# Patient Record
Sex: FEMALE | Race: WHITE | Employment: FULL TIME | URBAN - METROPOLITAN AREA
[De-identification: names, ages, dates, MRNs, and addresses within clinical notes are randomized per-mention and may not be internally consistent; named-entity substitution may affect disease eponyms.]

---

## 2019-07-02 ENCOUNTER — APPOINTMENT (OUTPATIENT)
Dept: CT IMAGING | Age: 39
End: 2019-07-02
Payer: COMMERCIAL

## 2019-07-02 ENCOUNTER — HOSPITAL ENCOUNTER (EMERGENCY)
Age: 39
Discharge: HOME | End: 2019-07-02
Attending: EMERGENCY MEDICINE
Payer: COMMERCIAL

## 2019-07-02 DIAGNOSIS — S39.011A ABDOMINAL WALL STRAIN, INITIAL ENCOUNTER: ICD-10-CM

## 2019-07-02 DIAGNOSIS — R73.9 HYPERGLYCEMIA: ICD-10-CM

## 2019-07-02 DIAGNOSIS — R10.9 ACUTE ABDOMINAL PAIN: Primary | ICD-10-CM

## 2019-07-02 LAB
A/G RATIO: 0.9 (ref 1.1–2.2)
ALBUMIN SERPL-MCNC: 3.7 G/DL (ref 3.4–5)
ALP BLD-CCNC: 114 U/L (ref 40–129)
ALT SERPL-CCNC: 68 U/L (ref 10–40)
ANION GAP SERPL CALCULATED.3IONS-SCNC: 13 MMOL/L (ref 3–16)
AST SERPL-CCNC: 96 U/L (ref 15–37)
BASOPHILS ABSOLUTE: 0.1 K/UL (ref 0–0.2)
BASOPHILS RELATIVE PERCENT: 1.4 %
BILIRUB SERPL-MCNC: 0.5 MG/DL (ref 0–1)
BILIRUBIN URINE: NEGATIVE
BLOOD, URINE: NEGATIVE
BUN BLDV-MCNC: 11 MG/DL (ref 7–20)
CALCIUM SERPL-MCNC: 9.5 MG/DL (ref 8.3–10.6)
CHLORIDE BLD-SCNC: 96 MMOL/L (ref 99–110)
CLARITY: CLEAR
CO2: 28 MMOL/L (ref 21–32)
COLOR: YELLOW
CREAT SERPL-MCNC: 0.6 MG/DL (ref 0.6–1.1)
EOSINOPHILS ABSOLUTE: 0.2 K/UL (ref 0–0.6)
EOSINOPHILS RELATIVE PERCENT: 2.9 %
GFR AFRICAN AMERICAN: >60
GFR NON-AFRICAN AMERICAN: >60
GLOBULIN: 3.9 G/DL
GLUCOSE BLD-MCNC: 323 MG/DL (ref 70–99)
GLUCOSE URINE: >=1000 MG/DL
HCG(URINE) PREGNANCY TEST: NEGATIVE
HCT VFR BLD CALC: 40.1 % (ref 36–48)
HEMOGLOBIN: 13.7 G/DL (ref 12–16)
KETONES, URINE: ABNORMAL MG/DL
LEUKOCYTE ESTERASE, URINE: NEGATIVE
LIPASE: 19 U/L (ref 13–60)
LYMPHOCYTES ABSOLUTE: 1.5 K/UL (ref 1–5.1)
LYMPHOCYTES RELATIVE PERCENT: 20.5 %
MCH RBC QN AUTO: 33.1 PG (ref 26–34)
MCHC RBC AUTO-ENTMCNC: 34.2 G/DL (ref 31–36)
MCV RBC AUTO: 96.7 FL (ref 80–100)
MICROSCOPIC EXAMINATION: ABNORMAL
MONOCYTES ABSOLUTE: 0.3 K/UL (ref 0–1.3)
MONOCYTES RELATIVE PERCENT: 4.3 %
NEUTROPHILS ABSOLUTE: 5.2 K/UL (ref 1.7–7.7)
NEUTROPHILS RELATIVE PERCENT: 70.9 %
NITRITE, URINE: NEGATIVE
PDW BLD-RTO: 12.1 % (ref 12.4–15.4)
PH UA: 6 (ref 5–8)
PLATELET # BLD: 250 K/UL (ref 135–450)
PMV BLD AUTO: 7.9 FL (ref 5–10.5)
POTASSIUM REFLEX MAGNESIUM: 4.4 MMOL/L (ref 3.5–5.1)
PROTEIN UA: NEGATIVE MG/DL
RBC # BLD: 4.15 M/UL (ref 4–5.2)
SODIUM BLD-SCNC: 137 MMOL/L (ref 136–145)
SPECIFIC GRAVITY UA: 1.01 (ref 1–1.03)
TOTAL PROTEIN: 7.6 G/DL (ref 6.4–8.2)
URINE REFLEX TO CULTURE: ABNORMAL
URINE TYPE: ABNORMAL
UROBILINOGEN, URINE: 1 E.U./DL
WBC # BLD: 7.3 K/UL (ref 4–11)

## 2019-07-02 PROCEDURE — 83690 ASSAY OF LIPASE: CPT

## 2019-07-02 PROCEDURE — 80053 COMPREHEN METABOLIC PANEL: CPT

## 2019-07-02 PROCEDURE — 74176 CT ABD & PELVIS W/O CONTRAST: CPT

## 2019-07-02 PROCEDURE — 36415 COLL VENOUS BLD VENIPUNCTURE: CPT

## 2019-07-02 PROCEDURE — 81003 URINALYSIS AUTO W/O SCOPE: CPT

## 2019-07-02 PROCEDURE — 85025 COMPLETE CBC W/AUTO DIFF WBC: CPT

## 2019-07-02 PROCEDURE — 99284 EMERGENCY DEPT VISIT MOD MDM: CPT

## 2019-07-02 PROCEDURE — 84703 CHORIONIC GONADOTROPIN ASSAY: CPT

## 2019-07-02 RX ORDER — NAPROXEN 500 MG/1
500 TABLET ORAL 2 TIMES DAILY
Qty: 15 TABLET | Refills: 0 | Status: SHIPPED | OUTPATIENT
Start: 2019-07-02 | End: 2021-07-22

## 2019-07-02 ASSESSMENT — PAIN SCALES - GENERAL: PAINLEVEL_OUTOF10: 2

## 2019-07-02 ASSESSMENT — PAIN DESCRIPTION - PAIN TYPE: TYPE: ACUTE PAIN

## 2019-07-02 ASSESSMENT — PAIN DESCRIPTION - DESCRIPTORS: DESCRIPTORS: STABBING

## 2019-07-02 ASSESSMENT — PAIN DESCRIPTION - LOCATION: LOCATION: ABDOMEN;BACK

## 2019-07-02 NOTE — ED PROVIDER NOTES
organizations: None     Relationship status: None    Intimate partner violence:     Fear of current or ex partner: None     Emotionally abused: None     Physically abused: None     Forced sexual activity: None   Other Topics Concern    None   Social History Narrative    None       SCREENINGS             PHYSICAL EXAM    (up to 7 for level 4, 8 or more for level 5)     ED Triage Vitals [07/02/19 1843]   BP Temp Temp Source Pulse Resp SpO2 Height Weight   (!) 154/84 98.2 °F (36.8 °C) Oral 91 18 95 % 5' 1\" (1.549 m) 296 lb 11.8 oz (134.6 kg)       Physical Exam   Constitutional: Awake and alert and oriented to person place and time. No apparent distress. Head: No visible evidence of trauma. Normocephalic. Eyes: Pupils equal and reactive. No photophobia. Conjunctiva normal.    HENT: Oral mucosa moist.  Airway patent. Neck:  Soft and supple. Heart:  Regular rate and rhythm. No murmur. Lungs:  Clear to auscultation. No wheezes, rales, or ronchi. No conversational dyspnea or accessory muscle use. Chest: Chest wall non-tender. No evidence of trauma. Abdomen:  Soft, nondistended, bowel sounds present. Mild tenderness in the right upper quadrant. Negative Gonzalez sign. No guarding rigidity or rebound. No masses. Musculoskeletal: Extremities non-tender with full range of motion. Radial and dorsalis pedis pulses were equal bilaterally. No calf tenderness erythema or edema. There was tenderness in the lower lumbar spine in the paravertebral musculature on the right. No point or axial tenderness. Negative straight leg raising. No saddle anesthesia. Neurological: Alert and oriented x 3. Speech clear. Cranial nerves II-XII intact. No facial droop. No acute focal motor or sensory deficits. Deep tendon reflexes were 2/4 bilaterally. Skin: Skin is warm and dry. No rash. Psychiatric: Normal mood and affect.  Behavior is normal.         DIAGNOSTIC RESULTS     EKG: All EKG's are interpreted by the [facility]   Total Critical Care time was 0 minutes, excluding separately reportable procedures. There was a high probability of clinically significant/life threatening deterioration in the patient's condition which required my urgent intervention. CONSULTS:  None    PROCEDURES:  Unless otherwise noted below, none     Procedures        FINAL IMPRESSION      1. Acute abdominal pain    2. Abdominal wall strain, initial encounter    3. Hyperglycemia          DISPOSITION/PLAN   DISPOSITION        PATIENT REFERRED TO:  MD Claude Nuno [address]  [phone]    Call today        DISCHARGE MEDICATIONS:  Discharge Medication List as of 7/2/2019  9:52 PM      START taking these medications    Details   naproxen (NAPROSYN) 500 MG tablet Take 1 tablet by mouth 2 times daily, Disp-15 tablet, R-0Print      metFORMIN (GLUCOPHAGE) 500 MG tablet Take 1 tablet by mouth 2 times daily (with meals), Disp-60 tablet, R-0Print           Controlled Substances Monitoring:     No flowsheet data found. (Please note that portions of this note were completed with a voice recognition program.  Efforts were made to edit the dictations but occasionally words are mis-transcribed. )    0899 Nikolas Patino DO (electronically signed)  Attending Emergency Physician          Eleni Prader, DO  07/03/19 1632

## 2019-07-02 NOTE — LETTER
[address] [address]  Phone: [phone]             July 2, 2019    Patient: Nahid López   YOB: 1980   Date of Visit: 7/2/2019       To Whom It May Concern:    Nahid López was seen and treated in our emergency department on 7/2/2019. She may return to work on 7/4/2019.       Sincerely,             Signature:__________________________________

## 2019-07-03 VITALS
HEART RATE: 84 BPM | TEMPERATURE: 98.2 F | HEIGHT: 61 IN | BODY MASS INDEX: 55.32 KG/M2 | RESPIRATION RATE: 20 BRPM | OXYGEN SATURATION: 97 % | WEIGHT: 293 LBS | DIASTOLIC BLOOD PRESSURE: 80 MMHG | SYSTOLIC BLOOD PRESSURE: 146 MMHG

## 2019-07-03 NOTE — ED NOTES
Asking for pain medicine. EMD to bedside and talking to her about test results.    Abd/back pain at 270 Mini Romero RN  07/03/19 2418

## 2019-07-03 NOTE — ED NOTES
Explaining new orders.   Abd/back pain at [facility], [address] [place] [address]  07/03/19 5364

## 2021-07-22 ENCOUNTER — HOSPITAL ENCOUNTER (EMERGENCY)
Age: 41
Discharge: HOME | End: 2021-07-22

## 2021-07-22 ENCOUNTER — APPOINTMENT (OUTPATIENT)
Dept: GENERAL RADIOLOGY | Age: 41
End: 2021-07-22

## 2021-07-22 VITALS
SYSTOLIC BLOOD PRESSURE: 159 MMHG | RESPIRATION RATE: 18 BRPM | TEMPERATURE: 98.2 F | HEIGHT: 61 IN | HEART RATE: 67 BPM | DIASTOLIC BLOOD PRESSURE: 85 MMHG | WEIGHT: 231.04 LBS | BODY MASS INDEX: 43.62 KG/M2 | OXYGEN SATURATION: 97 %

## 2021-07-22 DIAGNOSIS — R06.2 WHEEZING: ICD-10-CM

## 2021-07-22 DIAGNOSIS — R05.9 COUGH: Primary | ICD-10-CM

## 2021-07-22 DIAGNOSIS — R06.02 SHORTNESS OF BREATH: ICD-10-CM

## 2021-07-22 LAB
ANION GAP SERPL CALCULATED.3IONS-SCNC: 13 MMOL/L (ref 3–16)
BASOPHILS ABSOLUTE: 0 K/UL (ref 0–0.2)
BASOPHILS RELATIVE PERCENT: 0.5 %
BUN BLDV-MCNC: 8 MG/DL (ref 7–20)
CALCIUM SERPL-MCNC: 9.2 MG/DL (ref 8.3–10.6)
CHLORIDE BLD-SCNC: 100 MMOL/L (ref 99–110)
CO2: 28 MMOL/L (ref 21–32)
CREAT SERPL-MCNC: 0.6 MG/DL (ref 0.6–1.1)
EOSINOPHILS ABSOLUTE: 0.2 K/UL (ref 0–0.6)
EOSINOPHILS RELATIVE PERCENT: 2.9 %
GFR AFRICAN AMERICAN: >60
GFR NON-AFRICAN AMERICAN: >60
GLUCOSE BLD-MCNC: 253 MG/DL (ref 70–99)
HCT VFR BLD CALC: 41.5 % (ref 36–48)
HEMOGLOBIN: 14.4 G/DL (ref 12–16)
LACTIC ACID: 2.1 MMOL/L (ref 0.4–2)
LYMPHOCYTES ABSOLUTE: 1.7 K/UL (ref 1–5.1)
LYMPHOCYTES RELATIVE PERCENT: 27.6 %
MAGNESIUM: 1.8 MG/DL (ref 1.8–2.4)
MCH RBC QN AUTO: 34.4 PG (ref 26–34)
MCHC RBC AUTO-ENTMCNC: 34.8 G/DL (ref 31–36)
MCV RBC AUTO: 98.9 FL (ref 80–100)
MONOCYTES ABSOLUTE: 0.3 K/UL (ref 0–1.3)
MONOCYTES RELATIVE PERCENT: 5.2 %
NEUTROPHILS ABSOLUTE: 3.9 K/UL (ref 1.7–7.7)
NEUTROPHILS RELATIVE PERCENT: 63.8 %
PDW BLD-RTO: 12.7 % (ref 12.4–15.4)
PLATELET # BLD: 208 K/UL (ref 135–450)
PMV BLD AUTO: 7 FL (ref 5–10.5)
POTASSIUM REFLEX MAGNESIUM: 3.5 MMOL/L (ref 3.5–5.1)
PRO-BNP: 338 PG/ML (ref 0–124)
RBC # BLD: 4.2 M/UL (ref 4–5.2)
SARS-COV-2: NOT DETECTED
SODIUM BLD-SCNC: 141 MMOL/L (ref 136–145)
TROPONIN: <0.01 NG/ML
WBC # BLD: 6 K/UL (ref 4–11)

## 2021-07-22 PROCEDURE — 36415 COLL VENOUS BLD VENIPUNCTURE: CPT

## 2021-07-22 PROCEDURE — 83735 ASSAY OF MAGNESIUM: CPT

## 2021-07-22 PROCEDURE — 83880 ASSAY OF NATRIURETIC PEPTIDE: CPT

## 2021-07-22 PROCEDURE — U0005 INFEC AGEN DETEC AMPLI PROBE: HCPCS

## 2021-07-22 PROCEDURE — 6370000000 HC RX 637 (ALT 250 FOR IP): Performed by: PHYSICIAN ASSISTANT

## 2021-07-22 PROCEDURE — 80048 BASIC METABOLIC PNL TOTAL CA: CPT

## 2021-07-22 PROCEDURE — 83605 ASSAY OF LACTIC ACID: CPT

## 2021-07-22 PROCEDURE — U0003 INFECTIOUS AGENT DETECTION BY NUCLEIC ACID (DNA OR RNA); SEVERE ACUTE RESPIRATORY SYNDROME CORONAVIRUS 2 (SARS-COV-2) (CORONAVIRUS DISEASE [COVID-19]), AMPLIFIED PROBE TECHNIQUE, MAKING USE OF HIGH THROUGHPUT TECHNOLOGIES AS DESCRIBED BY CMS-2020-01-R: HCPCS

## 2021-07-22 PROCEDURE — 85025 COMPLETE CBC W/AUTO DIFF WBC: CPT

## 2021-07-22 PROCEDURE — 71046 X-RAY EXAM CHEST 2 VIEWS: CPT

## 2021-07-22 PROCEDURE — 6360000002 HC RX W HCPCS: Performed by: PHYSICIAN ASSISTANT

## 2021-07-22 PROCEDURE — 84484 ASSAY OF TROPONIN QUANT: CPT

## 2021-07-22 PROCEDURE — 96374 THER/PROPH/DIAG INJ IV PUSH: CPT

## 2021-07-22 PROCEDURE — 93005 ELECTROCARDIOGRAM TRACING: CPT | Performed by: PHYSICIAN ASSISTANT

## 2021-07-22 PROCEDURE — 99284 EMERGENCY DEPT VISIT MOD MDM: CPT

## 2021-07-22 RX ORDER — PREDNISONE 20 MG/1
40 TABLET ORAL DAILY
Qty: 8 TABLET | Refills: 0 | Status: SHIPPED | OUTPATIENT
Start: 2021-07-23 | End: 2021-07-27

## 2021-07-22 RX ORDER — AZITHROMYCIN 250 MG/1
250 TABLET, FILM COATED ORAL SEE ADMIN INSTRUCTIONS
Qty: 6 TABLET | Refills: 0 | Status: SHIPPED | OUTPATIENT
Start: 2021-07-22 | End: 2021-07-27

## 2021-07-22 RX ORDER — METHYLPREDNISOLONE SODIUM SUCCINATE 40 MG/ML
40 INJECTION, POWDER, LYOPHILIZED, FOR SOLUTION INTRAMUSCULAR; INTRAVENOUS ONCE
Status: COMPLETED | OUTPATIENT
Start: 2021-07-22 | End: 2021-07-22

## 2021-07-22 RX ORDER — IPRATROPIUM BROMIDE AND ALBUTEROL SULFATE 2.5; .5 MG/3ML; MG/3ML
1 SOLUTION RESPIRATORY (INHALATION)
Status: COMPLETED | OUTPATIENT
Start: 2021-07-22 | End: 2021-07-22

## 2021-07-22 RX ORDER — CODEINE PHOSPHATE AND GUAIFENESIN 10; 100 MG/5ML; MG/5ML
5 SOLUTION ORAL 4 TIMES DAILY PRN
Qty: 120 ML | Refills: 0 | Status: SHIPPED | OUTPATIENT
Start: 2021-07-22 | End: 2021-07-29

## 2021-07-22 RX ORDER — ALBUTEROL SULFATE 90 UG/1
2 AEROSOL, METERED RESPIRATORY (INHALATION) EVERY 6 HOURS PRN
Qty: 1 INHALER | Refills: 0 | Status: SHIPPED | OUTPATIENT
Start: 2021-07-22

## 2021-07-22 RX ADMIN — IPRATROPIUM BROMIDE AND ALBUTEROL SULFATE 1 AMPULE: .5; 3 SOLUTION RESPIRATORY (INHALATION) at 17:12

## 2021-07-22 RX ADMIN — IPRATROPIUM BROMIDE AND ALBUTEROL SULFATE 1 AMPULE: .5; 3 SOLUTION RESPIRATORY (INHALATION) at 16:52

## 2021-07-22 RX ADMIN — METHYLPREDNISOLONE SODIUM SUCCINATE 40 MG: 40 INJECTION, POWDER, FOR SOLUTION INTRAMUSCULAR; INTRAVENOUS at 16:52

## 2021-07-22 ASSESSMENT — PAIN SCALES - GENERAL: PAINLEVEL_OUTOF10: 0

## 2021-07-22 NOTE — ED NOTES
Patient aware of need for urine sample, patient unable to urinate at this time. Patient will call out when able to provide sample.        Breonna Anderson RN  07/22/21 3049

## 2021-07-22 NOTE — ED TRIAGE NOTES
Patient presents to ED complaining of shortness of breath and cough since Friday. Patient denies fever, denies chest pain, denies similar hx. Reports coughing up white sputum occasionally. Patient resting on bed, respirations even and easy at this time. No obvious distress.

## 2021-07-22 NOTE — ED PROVIDER NOTES
[address] ENCOUNTER      Pt Name: Montserrat Sue  MRN: [id number]  [place] 1980  Date of evaluation: 7/22/2021  Provider: RAD Mohr    The ED Attending Physician was available for consultation but did not see or evaluate this patient. CHIEF COMPLAINT       Chief Complaint   Patient presents with    Shortness of Breath     since 7/16. Denies fever, denies chest pain, reports productive cough with white sputumn. HISTORY OF PRESENT ILLNESS  (Location/Symptom, Timing/Onset, Context/Setting, Quality, Duration, Modifying Factors, Severity.)   Montserrat Sue is a 47 y.o. female who presents to the emergency department with complaints of difficulty breathing and cough. Patient says symptoms began 6 days ago, have been steadily worsening. She says she now finds it hard to breathe, even at rest, but is worse with physical activity. She is a cigarette smoker. No history of COVID-19 infection or vaccination. She says she usually does not cough much, but she has been coughing a lot over the last 6 days, productive of whitish sputum. Denies any cold symptoms such as congestion, rhinorrhea, sneezing. Denies any fever. Says she has been eating normally, no vomiting, diarrhea or abdominal pain. No significant chest pain but she is sore from coughing. No other complaints. Nursing Notes were reviewed and I agree. REVIEW OF SYSTEMS    (2-9 systems for level 4, 10 or more for level 5)     Constitutional:  Negative for fever, chills, unexpected weight change. HENT:  Negative for congestion, rhinorrhea, sneezing, sore throat, trouble swallowing. Respiratory: Positive for shortness of breath, productive cough, wheezing. Cardiovascular:  Negative for chest pain, palpitations, leg swelling. Gastrointestinal:  Negative for nausea, vomiting, abdominal pain, diarrhea, constipation, blood in stool.    Genitourinary:  Negative for dysuria, hematuria, flank pain, pelvic pain, abnormal vaginal bleeding. Musculoskeletal:  Negative for myalgias, arthralgias, neck pain or stiffness. Neurological:  Negative for dizziness, seizures, syncope, focal weakness, numbness, headache. Except as noted above the remainder of the review of systems was reviewed and negative. PAST MEDICAL HISTORY         Diagnosis Date    Asthma     Fatty liver     Obesity     Psoriasis     Psoriasis        SURGICAL HISTORY           Procedure Laterality Date    CHOLECYSTECTOMY         CURRENT MEDICATIONS       Discharge Medication List as of 7/22/2021  6:48 PM      CONTINUE these medications which have NOT CHANGED    Details   UNABLE TO [address]     Patient has no known allergies. FAMILY HISTORY     History reviewed. No pertinent family history. No family status information on file. SOCIAL HISTORY      reports that she has been smoking cigarettes. She has been smoking about 0.50 packs per day. She has never used smokeless tobacco. She reports current alcohol use. She reports that she does not use drugs. PHYSICAL EXAM    (up to 7 for level 4, 8 or more for level 5)     ED Triage Vitals [07/22/21 1621]   BP Temp Temp Source Pulse Resp SpO2 Height Weight   (!) 180/98 98.2 °F (36.8 °C) Oral 74 20 98 % 5' 1\" (1.549 m) 231 lb 0.7 oz (104.8 kg)       Constitutional:  Appearing well-developed and well-nourished. No distress. HENT:  Normocephalic and atraumatic. Conjunctivae and EOM normal. PERRLA. Cardiovascular:  Normal rate, regular rhythm, normal heart sounds, intact distal pulses. Pulmonary/Chest:  Effort normal.  No respiratory distress. Moderate expiratory wheezing throughout the lung fields, with mild rhonchi on the right. Abdominal:  Soft. Bowel sounds normal. No tenderness, distension, mass, rebound or guarding. Musculoskeletal:  Normal range of motion. No edema exhibited.    Neurological:  Alert and oriented to person, place, and time. No cranial nerve deficit observed. Skin:  Skin is warm and dry. Not diaphoretic. Psychiatric:  Normal mood, affect, behavior, judgment, thought content. DIAGNOSTIC RESULTS     When ordered, EKGs are interpreted by the ED physician in the absence of a cardiologist. Please the physician's note for interpretation of EKG.     RADIOLOGY:     Interpretation per the Radiologist below, if available at the time of this note:    XR CHEST (2 VW)   Final Result   No acute abnormality             LABS:  Labs Reviewed   LACTIC ACID, PLASMA - Abnormal; Notable for the following components:       Result Value    Lactic Acid 2.1 (*)     All other components within normal limits    Narrative:     Performed at:  [facility]  [address] [place], [place]   Phone [phone]   CBC WITH AUTO DIFFERENTIAL - Abnormal; Notable for the following components:    MCH 34.4 (*)     All other components within normal limits    Narrative:     Performed at:  [facility]  [address] [place], [place]   Phone [phone]   BASIC METABOLIC PANEL W/ REFLEX TO MG FOR LOW K - Abnormal; Notable for the following components:    Glucose 253 (*)     All other components within normal limits    Narrative:     Performed at:  [facility]  [address] [place], [place]   Phone [phone]   BRAIN NATRIURETIC PEPTIDE - Abnormal; Notable for the following components:    Pro- (*)     All other components within normal limits    Narrative:     Performed at:  [facility]  [address] [place], [place]   Phone [phone]   TROPONIN    Narrative:     Performed at:  [facility]  [address] [place], [place]   Phone [phone] MAGNESIUM    Narrative:     Performed at:  [facility]  [address], [facility]   Phone [phone]       All other labs were within normal range or not returned as of this dictation. EMERGENCY DEPARTMENT COURSE and DIFFERENTIAL DIAGNOSIS/MDM:   Vitals:    Vitals:    07/22/21 1736 07/22/21 1745 07/22/21 1824 07/22/21 1900   BP: (!) 169/92 (!) 149/78 (!) 167/95 (!) 159/85   Pulse: 94 79 76 67   Resp: 21 16 18 18   Temp:       TempSrc:       SpO2: 95% 96% 93% 97%   Weight:       Height:           The patient's condition in the ED was stable, and she was afebrile and nontoxic in appearance. She was not in respiratory distress, but had significant wheezing on exam, was coughing quite a bit. Oxygenated well on room air, normal vital signs. Chest x-ray showed no acute findings. EKG was nonconcerning, as interpreted by ED attending Dr. Pratima Chavira. Lab work-up showed a normal white blood cell count, negative troponin, slight elevated lactate 2.1, serum glucose 253. Patient was given breathing treatments and steroids in the ED, reported improvement in her breathing, and had much improved lung sounds. There was no indication for hospitalization or further workup. She be discharged with prescriptions for a course of steroids and albuterol inhaler, plus cough medication. She will also be prescribed a Z-Francis, given her cigarette smoking and increased risk for developing pneumonia. Says she has a primary care provider, and will be advised to follow-up if symptoms do not improve. The patient verbalized understanding and agreement with this plan of care. The patient was advised to return to the emergency department if symptoms should significantly worsen or if new and concerning symptoms should appear.      I estimate there is LOW risk for ACUTE CORONARY SYNDROME, PULMONARY EMBOLISM, STROKE, TRANSIENT ISCHEMIC ATTACK, THORACIC AORTIC DISSECTION, HEMORRHAGE, OR CRITICAL CARDIAC ARRHYTHMIA, thus I consider the discharge disposition reasonable. PROCEDURES:  None    FINAL IMPRESSION      1. Cough    2. Wheezing    3. Shortness of breath          DISPOSITION/PLAN   DISPOSITION Decision To Discharge 07/22/2021 06:29:03 PM      PATIENT REFERRED TO:  Rosemary Tejeda MD  Miguelina Reese  [phone]    Call in 3 days  If no improvement in symptoms, For follow-up care    [place] [address]  [phone]  Go to   If symptoms worsen      DISCHARGE MEDICATIONS:  Discharge Medication List as of 7/22/2021  6:48 PM      START taking these medications    Details   albuterol sulfate HFA (PROVENTIL HFA) 108 (90 Base) MCG/ACT inhaler Inhale 2 puffs into the lungs every 6 hours as needed for Wheezing or Shortness of Breath May substitute ProAir MDI, Disp-1 Inhaler, R-0Print      predniSONE (DELTASONE) 20 MG tablet Take 2 tablets by mouth daily for 4 days, Disp-8 tablet, R-0Print      guaiFENesin-codeine (GUAIFENESIN AC) 100-10 MG/5ML liquid Take 5 mLs by mouth 4 times daily as needed for Cough for up to 7 days. , Disp-120 mL, R-0Print      azithromycin (ZITHROMAX) 250 MG tablet Take 1 tablet by mouth See Admin Instructions for 5 days 500mg on day 1 followed by 250mg on days 2 - 5, Disp-6 tablet, R-0Print             (Please note that portions of this note were completed with a voice recognition program.  Efforts were made to edit the dictations but occasionally words are mis-transcribed.)    Shabana Pal, [address] [place] [address]  07/22/21 1912

## 2021-07-22 NOTE — ED NOTES
Patient ambulatory from ED. AVS provided and discussed with patient. All questions answered. Patient verbalizes understanding of discharge instructions. Respirations even and easy. No obvious distress at this time. Patient advised to take full course of antibiotics as prescribed, even if symptoms begin to subside. Patient verbalizes understanding.        Cornelius Ruth RN  07/22/21 8387

## 2021-07-23 LAB
EKG ATRIAL RATE: 66 BPM
EKG DIAGNOSIS: NORMAL
EKG P AXIS: -15 DEGREES
EKG P-R INTERVAL: 156 MS
EKG Q-T INTERVAL: 424 MS
EKG QRS DURATION: 80 MS
EKG QTC CALCULATION (BAZETT): 444 MS
EKG R AXIS: -18 DEGREES
EKG T AXIS: 18 DEGREES
EKG VENTRICULAR RATE: 66 BPM

## 2021-07-23 PROCEDURE — 93010 ELECTROCARDIOGRAM REPORT: CPT | Performed by: INTERNAL MEDICINE

## 2021-11-04 ENCOUNTER — HOSPITAL ENCOUNTER (EMERGENCY)
Age: 41
Discharge: HOME | End: 2021-11-04
Attending: STUDENT IN AN ORGANIZED HEALTH CARE EDUCATION/TRAINING PROGRAM

## 2021-11-04 VITALS
SYSTOLIC BLOOD PRESSURE: 188 MMHG | HEART RATE: 65 BPM | OXYGEN SATURATION: 99 % | HEIGHT: 60 IN | DIASTOLIC BLOOD PRESSURE: 109 MMHG | RESPIRATION RATE: 14 BRPM | BODY MASS INDEX: 47.22 KG/M2 | TEMPERATURE: 97.5 F | WEIGHT: 240.52 LBS

## 2021-11-04 DIAGNOSIS — H10.33 ACUTE BACTERIAL CONJUNCTIVITIS OF BOTH EYES: Primary | ICD-10-CM

## 2021-11-04 PROCEDURE — 6370000000 HC RX 637 (ALT 250 FOR IP): Performed by: STUDENT IN AN ORGANIZED HEALTH CARE EDUCATION/TRAINING PROGRAM

## 2021-11-04 PROCEDURE — 99284 EMERGENCY DEPT VISIT MOD MDM: CPT

## 2021-11-04 RX ORDER — DIPHENHYDRAMINE HCL 25 MG
1 CAPSULE ORAL 4 TIMES DAILY
Qty: 1 EACH | Refills: 0 | Status: SHIPPED | OUTPATIENT
Start: 2021-11-04

## 2021-11-04 RX ORDER — TETRACAINE HYDROCHLORIDE 5 MG/ML
2 SOLUTION OPHTHALMIC ONCE
Status: COMPLETED | OUTPATIENT
Start: 2021-11-04 | End: 2021-11-04

## 2021-11-04 RX ORDER — CIPROFLOXACIN HYDROCHLORIDE 3.5 MG/ML
2 SOLUTION/ DROPS TOPICAL
Qty: 1 EACH | Refills: 0 | Status: SHIPPED | OUTPATIENT
Start: 2021-11-04 | End: 2021-11-09

## 2021-11-04 RX ADMIN — FLUORESCEIN SODIUM 1 MG: 1 STRIP OPHTHALMIC at 12:41

## 2021-11-04 RX ADMIN — TETRACAINE HYDROCHLORIDE 2 DROP: 5 SOLUTION OPHTHALMIC at 12:42

## 2021-11-04 ASSESSMENT — VISUAL ACUITY
OD: 20/70
OS: 20/100
OU: 20/50

## 2021-11-04 ASSESSMENT — PAIN DESCRIPTION - LOCATION
LOCATION: EYE
LOCATION: EYE

## 2021-11-04 ASSESSMENT — PAIN DESCRIPTION - PAIN TYPE
TYPE: ACUTE PAIN
TYPE: ACUTE PAIN

## 2021-11-04 ASSESSMENT — PAIN DESCRIPTION - ORIENTATION
ORIENTATION: RIGHT;LEFT
ORIENTATION: RIGHT;LEFT

## 2021-11-04 ASSESSMENT — PAIN - FUNCTIONAL ASSESSMENT: PAIN_FUNCTIONAL_ASSESSMENT: 0-10

## 2021-11-04 ASSESSMENT — PAIN DESCRIPTION - DESCRIPTORS: DESCRIPTORS: ACHING

## 2021-11-04 ASSESSMENT — PAIN SCALES - GENERAL
PAINLEVEL_OUTOF10: 10
PAINLEVEL_OUTOF10: 10

## 2021-11-04 ASSESSMENT — PAIN DESCRIPTION - FREQUENCY: FREQUENCY: CONTINUOUS

## 2021-11-04 NOTE — ED PROVIDER NOTES
Triage note: I evaluated this patient to initiate their ED workup in an expeditious manner. Please see notes from other ED providers regarding comprehensive evaluation including full history, physical exam, interpretation of results, and medical decision making/disposition. HISTORY OF PRESENT ILLNESS  Shahida Horner is a 46 y.o. female presents today with complaints of bilateral eye swelling, redness, pain, light sensitivity, and nausea. Symptoms started this morning spontaneously. Does wear contacts, removed them this AM. Prior to that she had last changed her contacts about 2 days previously. PHYSICAL EXAM  ED Triage Vitals [11/04/21 1200]   BP Temp Temp Source Pulse Resp SpO2 Height Weight   (!) 207/117 97.5 °F (36.4 °C) Temporal 68 16 97 % 5' (1.524 m) 240 lb 8.4 oz (109.1 kg)       On exam, with red, swollen eyes, watery discharge. Visual acuity ordered.          Winnie Rubinma  11/04/21 1203

## 2021-11-04 NOTE — ED PROVIDER NOTES
[facility]      Pt Name: Patrice Stearns  MRN: [id number]  [place] 1980  Date of evaluation: 11/4/2021  Provider: Loan Coleman MD    CHIEF COMPLAINT       Chief Complaint   Patient presents with   [facility] Problem     both eyes red and swollen, no known allergies, no new medications or products. Bilateral eye pain, swelling and redness    HISTORY OF PRESENT ILLNESS   (Location/Symptom, Timing/Onset,Context/Setting, Quality, Duration, Modifying Factors, Severity)  Note limiting factors. Patrice Stearns is a 46 y.o. female who presents to the emergency department c/o bilateral eye pain, swelling and redness since this morning, onset noticed when she woke up this morning, described as eye redness and slight pain, burning sensation. Denies recent caustic exposure, associated with crusty discharge and mild lid edema. Preceded by ear fullness and nasal congestion. Denies fevers, loss of vision, diplopia, eye injury. Wears contacts. Symptoms not otherwise alleviated or exacerbated by other factors. NursingNotes were reviewed. REVIEW OF SYSTEMS    (2-9 systems for level 4, 10 or more for level 5)       Constitutional: No fever or chills. Eye: +slight blurriness visual disturbances. + eye pain. Ear/Nose/Mouth/Throat: No nasal congestion. No sore throat. Respiratory: No cough, No shortness of breath, No sputum production. Cardiovascular: No chest pain. No palpitations. Gastrointestinal: No abdominal pain. No nausea or vomiting  Genitourinary: No dysuria. No hematuria. Hematology/Lymphatics: No bleeding or bruising tendency. Immunologic: No malaise. No swollen glands. Musculoskeletal: No back pain. No joint pain. Integumentary: No rash. No abrasions. Neurologic: No headache. No focal numbness or weakness.       PAST MEDICAL HISTORY     Past Medical History:   Diagnosis Date    Asthma     Fatty liver     Obesity     Psoriasis     Psoriasis          SURGICALHISTORY       Past Surgical History:   Procedure Laterality Date    CHOLECYSTECTOMY           CURRENT MEDICATIONS       Discharge Medication List as of 11/4/2021  1:08 PM      CONTINUE these medications which have NOT CHANGED    Details   UNABLE TO FIND [facility] Med      albuterol sulfate HFA (PROVENTIL HFA) 108 (90 Base) MCG/ACT inhaler Inhale 2 puffs into the lungs every 6 hours as needed for Wheezing or Shortness of Breath May substitute ProAir MDI, Disp-1 Inhaler, R-0Print             ALLERGIES     Patient has no known allergies. FAMILY HISTORY     History reviewed. No pertinent family history. SOCIAL HISTORY       Social History     Socioeconomic History    Marital status: Single     Spouse name: None    Number of children: None    Years of education: None    Highest education level: None   Occupational History    None   Tobacco Use    Smoking status: Current Some Day Smoker     Packs/day: 0.50     Types: Cigarettes    Smokeless tobacco: Never Used   Vaping Use    Vaping Use: Never used   Substance and Sexual Activity    Alcohol use: Yes     Comment: once a week    Drug use: No    Sexual activity: None   Other Topics Concern    None   Social History Narrative    None     Social Determinants of Health     Financial Resource Strain:     Difficulty of Paying Living Expenses:    Food Insecurity:     Worried About Running Out of Food in the Last Year:     Ran Out of Food in the Last Year:    Transportation Needs:     Lack of Transportation (Medical):      Lack of Transportation (Non-Medical):    Physical Activity:     Days of Exercise per Week:     Minutes of Exercise per Session:    Stress:     Feeling of Stress :    Social Connections:     Frequency of Communication with Friends and Family:     Frequency of Social Gatherings with Friends and Family:     Attends [employer] Services:     Active Member of Clubs or Organizations:     Attends Club or Organization Meetings:     Marital Status:    Intimate Partner Violence:     Fear of Current or Ex-Partner:     Emotionally Abused:     Physically Abused:     Sexually Abused:        SCREENINGS             PHYSICAL EXAM    (up to 7 for level 4, 8 or more for level 5)     ED Triage Vitals [21 1200]   BP Temp Temp Source Pulse Resp SpO2 Height Weight   (!) 207/117 97.5 °F (36.4 °C) Temporal 68 16 97 % 5' (1.524 m) 240 lb 8.4 oz (109.1 kg)       General: Alert and oriented appropriately for age, No acute distress. Eye: bilateral conjunctival injection, visual acuity grossly intact, scant mucoid discharge most prominent at the medial canthus. Pupils equal and reactive. No diplopia, no photophobia, no hyphema, no hypopyon. No corneal fluorescein uptake, no ulceration, no abrasions, -ve Jose's. HENT: Oral mucosa is moist.  TMs wnl bilaterally. Nasal congestion mild. Respiratory: Respirations even and non-labored. Lungs CTAB. Cardiovascular: Normal rate, Regular rhythm. Intact peripheral pulses. Gastrointestinal: Soft, Non-tender, Non-distended. Musculoskeletal: No swelling. Integumentary: Warm, Dry. Neurologic: Alert and appropriate for age. No focal deficits. Psychiatric: Cooperative. DIAGNOSTIC RESULTS   \    EMERGENCY DEPARTMENT COURSE and DIFFERENTIAL DIAGNOSIS/MDM:   Vitals:    Vitals:    21 1200 21 1231   BP: (!) 207/117 (!) 188/109   Pulse: 68 65   Resp: 16 14   Temp: 97.5 °F (36.4 °C)    TempSrc: Temporal    SpO2: 97% 99%   Weight: 240 lb 8.4 oz (109.1 kg)    Height: 5' (1.524 m)          Medical decision makin yo F with conjunctivitis, given drainage and onset, degree of discomfort and that the pt wears contacts, c/f bacterial etiology, denies caustic exposure or exposure to potential new allergens, no ulceration present on exam, recommended discard current contacts and to not use contacts until asymptomatic and to follow up with her eye doctor. Visual acuity wnl. Pt feels much better after tetracaine instilled for the exam.  Rx abx gtts and artificial tears. Given d/c instructions and return precautions, pt voices understanding. D/c home, ambulated steadily from the ED. Medications   tetracaine (TETRAVISC) 0.5 % ophthalmic solution 2 drop (2 drops Both Eyes Given 11/4/21 1242)   fluorescein ophthalmic strip 1 mg (1 mg Both Eyes Given 11/4/21 1241)           FINAL IMPRESSION      1. Acute bacterial conjunctivitis of both eyes          DISPOSITION/PLAN   DISPOSITION Decision To Discharge 11/04/2021 01:00:51 PM      PATIENT REFERRED TO:  Claudette Diamond MD  [address] [facility] [address] [facility]  [phone]    In 2 days      [address]  [address] [place] [address] [facility]  [phone]  In 1 day  if you are unable to get into your eye doctor as we discussed.     [facility]  [address] [facility]  [phone]    If symptoms worsen      DISCHARGE MEDICATIONS:  Discharge Medication List as of 11/4/2021  1:08 PM      START taking these medications    Details   ciprofloxacin (CILOXAN) 0.3 % ophthalmic solution Apply 2 drops to eye every 2 hours for 5 days Then 2 drops in affected eye every 4 hours for 5 days, Disp-1 each, R-0Print      dextran 70-hypromellose (ARTIFICIAL TEARS) 0.1-0.3 % SOLN opthalmic solution Place 1 drop into both eyes 4 times daily, Disp-1 each, R-0Print                (Please note that portions of this note were completed with a voice recognition program.Efforts were made to edit the dictations but occasionally words are mis-transcribed.)    Juanjo Lagunas MD (electronically signed)  Attending Emergency Physician          Juanjo Lagunas MD  11/05/21 8941

## 2022-11-01 ENCOUNTER — APPOINTMENT (OUTPATIENT)
Dept: GENERAL RADIOLOGY | Age: 42
End: 2022-11-01
Payer: COMMERCIAL

## 2022-11-01 ENCOUNTER — APPOINTMENT (OUTPATIENT)
Dept: CT IMAGING | Age: 42
End: 2022-11-01
Payer: COMMERCIAL

## 2022-11-01 ENCOUNTER — HOSPITAL ENCOUNTER (EMERGENCY)
Age: 42
Discharge: HOME | End: 2022-11-01
Payer: COMMERCIAL

## 2022-11-01 VITALS
RESPIRATION RATE: 16 BRPM | OXYGEN SATURATION: 97 % | HEART RATE: 80 BPM | DIASTOLIC BLOOD PRESSURE: 115 MMHG | TEMPERATURE: 98.2 F | HEIGHT: 61 IN | WEIGHT: 278.66 LBS | BODY MASS INDEX: 52.61 KG/M2 | SYSTOLIC BLOOD PRESSURE: 185 MMHG
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
## 2022-11-01 PROCEDURE — 99284 EMERGENCY DEPT VISIT MOD MDM: CPT

## 2022-11-01 PROCEDURE — 70450 CT HEAD/BRAIN W/O DYE: CPT

## 2022-11-01 PROCEDURE — 73060 X-RAY EXAM OF HUMERUS: CPT

## 2022-11-01 PROCEDURE — 72125 CT NECK SPINE W/O DYE: CPT

## 2022-11-01 PROCEDURE — 73090 X-RAY EXAM OF FOREARM: CPT

## 2022-11-01 RX ORDER — NAPROXEN 500 MG/1
500 TABLET ORAL 2 TIMES DAILY WITH MEALS
Qty: 30 TABLET | Refills: 0 | Status: SHIPPED | OUTPATIENT
Start: 2022-11-01

## 2022-11-01 RX ORDER — METHOCARBAMOL 500 MG/1
500 TABLET, FILM COATED ORAL 4 TIMES DAILY
Qty: 40 TABLET | Refills: 0 | Status: SHIPPED | OUTPATIENT
Start: 2022-11-01 | End: 2022-11-11

## 2022-11-01 ASSESSMENT — ENCOUNTER SYMPTOMS
SHORTNESS OF BREATH: 0
NAUSEA: 0
CONSTIPATION: 0
PHOTOPHOBIA: 0
VOMITING: 0
COLOR CHANGE: 0
ABDOMINAL PAIN: 0
BACK PAIN: 0
COUGH: 0
DIARRHEA: 0
RESPIRATORY NEGATIVE: 1
CHEST TIGHTNESS: 0

## 2022-11-01 ASSESSMENT — PAIN - FUNCTIONAL ASSESSMENT: PAIN_FUNCTIONAL_ASSESSMENT: NONE - DENIES PAIN
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
## 2023-06-20 ENCOUNTER — HOSPITAL ENCOUNTER (EMERGENCY)
Age: 43
Discharge: HOME | End: 2023-06-20
Attending: EMERGENCY MEDICINE

## 2023-06-20 ENCOUNTER — APPOINTMENT (OUTPATIENT)
Dept: GENERAL RADIOLOGY | Age: 43
End: 2023-06-20
Attending: EMERGENCY MEDICINE

## 2023-06-20 VITALS
HEIGHT: 62 IN | SYSTOLIC BLOOD PRESSURE: 163 MMHG | BODY MASS INDEX: 49.05 KG/M2 | WEIGHT: 266.54 LBS | DIASTOLIC BLOOD PRESSURE: 93 MMHG | TEMPERATURE: 98 F | HEART RATE: 74 BPM | RESPIRATION RATE: 18 BRPM | OXYGEN SATURATION: 99 %

## 2023-06-20 DIAGNOSIS — J01.90 ACUTE SINUSITIS, RECURRENCE NOT SPECIFIED, UNSPECIFIED LOCATION: Primary | ICD-10-CM

## 2023-06-20 DIAGNOSIS — J45.901 MODERATE ASTHMA WITH ACUTE EXACERBATION, UNSPECIFIED WHETHER PERSISTENT: ICD-10-CM

## 2023-06-20 PROCEDURE — 6360000002 HC RX W HCPCS: Performed by: EMERGENCY MEDICINE

## 2023-06-20 PROCEDURE — 71046 X-RAY EXAM CHEST 2 VIEWS: CPT

## 2023-06-20 PROCEDURE — 99283 EMERGENCY DEPT VISIT LOW MDM: CPT

## 2023-06-20 RX ORDER — ALBUTEROL SULFATE 2.5 MG/3ML
5 SOLUTION RESPIRATORY (INHALATION) ONCE
Status: COMPLETED | OUTPATIENT
Start: 2023-06-20 | End: 2023-06-20

## 2023-06-20 RX ORDER — FEXOFENADINE HCL 180 MG/1
180 TABLET ORAL DAILY
Qty: 30 TABLET | Refills: 0 | Status: SHIPPED | OUTPATIENT
Start: 2023-06-20 | End: 2023-07-20

## 2023-06-20 RX ORDER — TRIAMCINOLONE ACETONIDE 55 UG/1
2 SPRAY, METERED NASAL DAILY
Qty: 1 EACH | Refills: 3 | Status: SHIPPED | OUTPATIENT
Start: 2023-06-20

## 2023-06-20 RX ORDER — ALBUTEROL SULFATE 90 UG/1
2 AEROSOL, METERED RESPIRATORY (INHALATION) 4 TIMES DAILY PRN
Qty: 18 G | Refills: 0 | Status: SHIPPED | OUTPATIENT
Start: 2023-06-20

## 2023-06-20 RX ADMIN — ALBUTEROL SULFATE 5 MG: 2.5 SOLUTION RESPIRATORY (INHALATION) at 13:27

## 2023-06-20 ASSESSMENT — ENCOUNTER SYMPTOMS
ABDOMINAL DISTENTION: 0
SINUS PAIN: 1
SHORTNESS OF BREATH: 0
ABDOMINAL PAIN: 0
SINUS PRESSURE: 1
COUGH: 1

## 2023-06-20 NOTE — ED NOTES
Pt arrives ambulatory to ED, pt states she she was diagnoised with a sinus infection 6 months ago and she has been dealing with sinus pressure/ runny nose ever since. Pt also reports a foul smelling odor all the time.      Treasure Morris RN  06/20/23 7128

## 2023-06-20 NOTE — DISCHARGE INSTRUCTIONS
Follow-up with your primary care doctor for recheck and reevaluation early next week sooner if worse or problems. Return immediately if any concerns. Use your inhaler as needed for cough or shortness of breath. Use the nasal spray and the allergy medicine every day.   Return if any concerns or worsening symptoms

## 2023-06-20 NOTE — ED PROVIDER NOTES
[address] ENCOUNTER      Pt Name: Keanu Roque  MRN: [id number]  [place] 1980  Date of evaluation: 6/20/2023  Provider: Colten Mcclain MD    CHIEF COMPLAINT       Chief Complaint   Patient presents with    Sinusitis     Pt states she she was diagnoised with a sinus infection 6 months ago and she has been dealing with sinus pressure/ runny nose ever since. Pt also reports a foul smelling odor all the time. HISTORY OF PRESENT ILLNESS   (Location/Symptom, Timing/Onset, Context/Setting, Quality, Duration, Modifying Factors, Severity)  Note limiting factors. Keanu Roque is a 38 y.o. female who presents to the emergency department with the chief complaint of   Chief Complaint   Patient presents with    Sinusitis     Pt states she she was diagnoised with a sinus infection 6 months ago and she has been dealing with sinus pressure/ runny nose ever since. Pt also reports a foul smelling odor all the time. . Patient comes in complaining of sinus pressure and a lots of nasal discharge for the last 6 months. Patient states she was told she had a sinus infection 6 months ago. The patient has not been on any medications for allergies. The patient is a smoker and has had a slight cough but no shortness of breath. No fevers or chills. She denies any other complaints or problems. However in reviewing her old charts she does have some history of asthma fatty liver obesity and psoriasis      Nursing Notes were reviewed. REVIEW OF SYSTEMS    (2-9 systems for level 4, 10 or more for level 5)     Review of Systems   Constitutional:  Negative for chills and fever. HENT:  Positive for congestion, postnasal drip, sinus pressure and sinus pain. Respiratory:  Positive for cough. Negative for shortness of breath. Cardiovascular:  Negative for chest pain. Gastrointestinal:  Negative for abdominal distention and abdominal pain.      Except as

## 2024-07-04 ENCOUNTER — APPOINTMENT (OUTPATIENT)
Dept: GENERAL RADIOLOGY | Age: 44
End: 2024-07-04
Payer: MEDICAID

## 2024-07-04 ENCOUNTER — HOSPITAL ENCOUNTER (EMERGENCY)
Age: 44
Discharge: HOME | End: 2024-07-04
Attending: STUDENT IN AN ORGANIZED HEALTH CARE EDUCATION/TRAINING PROGRAM
Payer: MEDICAID

## 2024-07-04 VITALS
WEIGHT: 263.89 LBS | BODY MASS INDEX: 48.27 KG/M2 | RESPIRATION RATE: 14 BRPM | OXYGEN SATURATION: 98 % | HEART RATE: 75 BPM | SYSTOLIC BLOOD PRESSURE: 157 MMHG | DIASTOLIC BLOOD PRESSURE: 108 MMHG | TEMPERATURE: 98.1 F

## 2024-07-04 DIAGNOSIS — S93.402A SPRAIN OF LEFT ANKLE, UNSPECIFIED LIGAMENT, INITIAL ENCOUNTER: Primary | ICD-10-CM

## 2024-07-04 PROCEDURE — 99283 EMERGENCY DEPT VISIT LOW MDM: CPT

## 2024-07-04 PROCEDURE — 73610 X-RAY EXAM OF ANKLE: CPT

## 2024-07-04 PROCEDURE — 73630 X-RAY EXAM OF FOOT: CPT

## 2024-07-04 PROCEDURE — 6370000000 HC RX 637 (ALT 250 FOR IP): Performed by: STUDENT IN AN ORGANIZED HEALTH CARE EDUCATION/TRAINING PROGRAM

## 2024-07-04 RX ORDER — HYDROCODONE BITARTRATE AND ACETAMINOPHEN 5; 325 MG/1; MG/1
1 TABLET ORAL ONCE
Status: COMPLETED | OUTPATIENT
Start: 2024-07-04 | End: 2024-07-04

## 2024-07-04 RX ADMIN — HYDROCODONE BITARTRATE AND ACETAMINOPHEN 1 TABLET: 5; 325 TABLET ORAL at 13:47

## 2024-07-04 ASSESSMENT — PAIN DESCRIPTION - DESCRIPTORS: DESCRIPTORS: ACHING;BURNING

## 2024-07-04 ASSESSMENT — PAIN DESCRIPTION - LOCATION
LOCATION: ANKLE
LOCATION: ANKLE

## 2024-07-04 ASSESSMENT — PAIN - FUNCTIONAL ASSESSMENT
PAIN_FUNCTIONAL_ASSESSMENT: ACTIVITIES ARE NOT PREVENTED
PAIN_FUNCTIONAL_ASSESSMENT: 0-10
PAIN_FUNCTIONAL_ASSESSMENT: NONE - DENIES PAIN

## 2024-07-04 ASSESSMENT — PAIN SCALES - GENERAL
PAINLEVEL_OUTOF10: 10
PAINLEVEL_OUTOF10: 10

## 2024-07-04 ASSESSMENT — PAIN DESCRIPTION - ORIENTATION
ORIENTATION: LEFT
ORIENTATION: LEFT

## 2024-07-04 ASSESSMENT — PAIN DESCRIPTION - PAIN TYPE: TYPE: ACUTE PAIN

## 2024-07-04 NOTE — ED PROVIDER NOTES
[facility] EMERGENCY DEPARTMENT      EMERGENCY MEDICINE     Pt Name: Stacie Donato  MRN: [id number]  Birthdate 1980  Date of evaluation: 7/4/2024  Provider: Oscar Mayen MD    CHIEF COMPLAINT       Chief Complaint   Patient presents with    Ankle Pain     Left ankle pain     HISTORY OF PRESENT ILLNESS   Stacie Donato is a 43 y.o. female who presents to the emergency department for left ankle/foot pain after rolling it 2 days ago getting out of bed.  Has had persistent pain and irritation and came in for evaluation.    PASTMEDICAL HISTORY     Past Medical History:   Diagnosis Date    Asthma     Fatty liver     Obesity     Psoriasis     Psoriasis        There is no problem list on file for this patient.    SURGICAL HISTORY       Past Surgical History:   Procedure Laterality Date    CHOLECYSTECTOMY         CURRENT MEDICATIONS       Previous Medications    ALBUTEROL SULFATE HFA (VENTOLIN HFA) 108 (90 BASE) MCG/ACT INHALER    Inhale 2 puffs into the lungs 4 times daily as needed for Wheezing    DEXTRAN 70-HYPROMELLOSE (ARTIFICIAL TEARS) 0.1-0.3 % SOLN OPTHALMIC SOLUTION    Place 1 drop into both eyes 4 times daily    NAPROXEN (NAPROSYN) 500 MG TABLET    Take 1 tablet by mouth 2 times daily (with meals)    SPACER/AERO-HOLDING CHAMBERS RAY    1 Device by Does not apply route daily as needed (Use with your inhaler every time)    TRIAMCINOLONE (NASACORT) 55 MCG/ACT NASAL INHALER    2 sprays by Each Nostril route daily    UNABLE TO FIND    skirizi       ALLERGIES     has No Known Allergies.    FAMILY HISTORY     has no family status information on file.        SOCIAL HISTORY       Social History     Tobacco Use    Smoking status: Some Days     Current packs/day: 0.50     Types: Cigarettes    Smokeless tobacco: Never   Vaping Use    Vaping Use: Never used   Substance Use Topics    Alcohol use: Yes     Comment: once a week    Drug use: No       PHYSICAL EXAM       ED Triage Vitals  foot.     XR FOOT LEFT (MIN 3 VIEWS)    Result Date: 7/4/2024  No acute osseous abnormality in the left ankle or left foot.      No results found.     LABS: (none if blank)  Labs Reviewed - No data to display    (Any cultures that may have been sent were not resulted at the time of this patient visit)    MEDICAL DECISION MAKING / ED COURSE:     External Documentation Reviewed: Previous patient encounter documents & history available on EMR was reviewed:   Record from: .      Decision Rules/Clinical Scores utilized:               See Formal Diagnostic Results above for the lab and radiology tests and orders.    ED Reassessment:  See ED course    ED Course as of 07/04/24 1423   Thu Jul 04, 2024   1333 Left foot sprain 3 days ago left ankle pain persistent difficulty ambulating no prior injury [AL]   1420 XR ANKLE LEFT (MIN 3 VIEWS)  \"IMPRESSION:  No acute osseous abnormality in the left ankle or left foot.     This result has not been signed. Information might be incomplete  \" [AL]      ED Course User Index  [AL] Oscar Mayen MD       Is this patient to be included in the SEP-1 core measure? No Exclusion criteria - the patient is NOT to be included for SEP-1 Core Measure due to: Infection is not suspected     MDM Summary:  History was obtained from: Patient      This is an obese 43-year-old female who had injured her left foot getting out of bed 2 days ago.  Had persistent pain in the left ankle left foot.  Normal range of motion neurovascular intact minimal tenderness lateral malleolus.  X-ray shows no acute fracture dislocation.  Suspicious for ankle sprain at this time should be Ace wrap given orthopedic follow-up outpatient    Consults (none if blank):        Shared Decision-Making was performed and disposition discussed with the patient and their questions were answered     Social determinants of health impacting treatment or disposition:  None      Code Status:    Not addressed at this

## 2025-03-05 ENCOUNTER — HOSPITAL ENCOUNTER (EMERGENCY)
Age: 45
Discharge: HOME | DRG: 193 | End: 2025-03-05
Attending: STUDENT IN AN ORGANIZED HEALTH CARE EDUCATION/TRAINING PROGRAM
Payer: COMMERCIAL

## 2025-03-05 ENCOUNTER — APPOINTMENT (OUTPATIENT)
Dept: GENERAL RADIOLOGY | Age: 45
DRG: 193 | End: 2025-03-05
Payer: COMMERCIAL

## 2025-03-05 VITALS
DIASTOLIC BLOOD PRESSURE: 87 MMHG | HEART RATE: 77 BPM | OXYGEN SATURATION: 95 % | BODY MASS INDEX: 49.34 KG/M2 | WEIGHT: 251.32 LBS | RESPIRATION RATE: 14 BRPM | TEMPERATURE: 98.3 F | SYSTOLIC BLOOD PRESSURE: 128 MMHG | HEIGHT: 60 IN

## 2025-03-05 DIAGNOSIS — E11.9 TYPE 2 DIABETES MELLITUS WITHOUT COMPLICATION, WITHOUT LONG-TERM CURRENT USE OF INSULIN (HCC): Primary | ICD-10-CM

## 2025-03-05 DIAGNOSIS — R07.9 CHEST PAIN, UNSPECIFIED TYPE: ICD-10-CM

## 2025-03-05 LAB
ALBUMIN SERPL-MCNC: 3.6 G/DL (ref 3.4–5)
ALBUMIN/GLOB SERPL: 1 {RATIO} (ref 1.1–2.2)
ALP SERPL-CCNC: 115 U/L (ref 40–129)
ALT SERPL-CCNC: 40 U/L (ref 10–40)
ANION GAP SERPL CALCULATED.3IONS-SCNC: 11 MMOL/L (ref 3–16)
AST SERPL-CCNC: 58 U/L (ref 15–37)
BACTERIA URNS QL MICRO: ABNORMAL /HPF
BASOPHILS # BLD: 0 K/UL (ref 0–0.2)
BASOPHILS NFR BLD: 0.6 %
BILIRUB SERPL-MCNC: 0.5 MG/DL (ref 0–1)
BILIRUB UR QL STRIP.AUTO: NEGATIVE
BUN SERPL-MCNC: 8 MG/DL (ref 7–20)
CALCIUM SERPL-MCNC: 8.9 MG/DL (ref 8.3–10.6)
CHARACTER UR: ABNORMAL
CHLORIDE SERPL-SCNC: 95 MMOL/L (ref 99–110)
CLARITY UR: CLEAR
CO2 SERPL-SCNC: 25 MMOL/L (ref 21–32)
COLOR UR: ABNORMAL
CREAT SERPL-MCNC: 0.7 MG/DL (ref 0.6–1.1)
DEPRECATED RDW RBC AUTO: 12 % (ref 12.4–15.4)
EKG ATRIAL RATE: 87 BPM
EKG DIAGNOSIS: NORMAL
EKG P AXIS: -9 DEGREES
EKG P-R INTERVAL: 150 MS
EKG Q-T INTERVAL: 382 MS
EKG QRS DURATION: 74 MS
EKG QTC CALCULATION (BAZETT): 459 MS
EKG R AXIS: -3 DEGREES
EKG T AXIS: 35 DEGREES
EKG VENTRICULAR RATE: 87 BPM
EOSINOPHIL # BLD: 0.1 K/UL (ref 0–0.6)
EOSINOPHIL NFR BLD: 1.6 %
EPI CELLS #/AREA URNS HPF: ABNORMAL /HPF (ref 0–5)
EST. AVERAGE GLUCOSE BLD GHB EST-MCNC: 251.8 MG/DL
FLUAV + FLUBV AG NOSE IA.RAPID: NOT DETECTED
FLUAV + FLUBV AG NOSE IA.RAPID: NOT DETECTED
GFR SERPLBLD CREATININE-BSD FMLA CKD-EPI: >90 ML/MIN/{1.73_M2}
GLUCOSE SERPL-MCNC: 378 MG/DL (ref 70–99)
GLUCOSE UR STRIP.AUTO-MCNC: >=1000 MG/DL
HBA1C MFR BLD: 10.4 %
HCG SERPL QL: NEGATIVE
HCT VFR BLD AUTO: 41.8 % (ref 36–48)
HGB BLD-MCNC: 14.5 G/DL (ref 12–16)
HGB UR QL STRIP.AUTO: NEGATIVE
HYALINE CASTS #/AREA URNS LPF: ABNORMAL /LPF (ref 0–2)
KETONES UR STRIP.AUTO-MCNC: ABNORMAL MG/DL
LEUKOCYTE ESTERASE UR QL STRIP.AUTO: NEGATIVE
LIPASE SERPL-CCNC: 19 U/L (ref 13–60)
LYMPHOCYTES # BLD: 1.3 K/UL (ref 1–5.1)
LYMPHOCYTES NFR BLD: 18.7 %
MCH RBC QN AUTO: 33.1 PG (ref 26–34)
MCHC RBC AUTO-ENTMCNC: 34.7 G/DL (ref 31–36)
MCV RBC AUTO: 95.2 FL (ref 80–100)
MONOCYTES # BLD: 0.7 K/UL (ref 0–1.3)
MONOCYTES NFR BLD: 10.2 %
NEUTROPHILS # BLD: 4.7 K/UL (ref 1.7–7.7)
NEUTROPHILS NFR BLD: 68.9 %
NITRITE UR QL STRIP.AUTO: NEGATIVE
PH UR STRIP.AUTO: 5.5 [PH] (ref 5–8)
PLATELET # BLD AUTO: 185 K/UL (ref 135–450)
PMV BLD AUTO: 7.4 FL (ref 5–10.5)
POTASSIUM SERPL-SCNC: 4.1 MMOL/L (ref 3.5–5.1)
PROT SERPL-MCNC: 7.2 G/DL (ref 6.4–8.2)
PROT UR STRIP.AUTO-MCNC: ABNORMAL MG/DL
RBC # BLD AUTO: 4.39 M/UL (ref 4–5.2)
RBC #/AREA URNS HPF: ABNORMAL /HPF (ref 0–4)
SARS-COV-2 RDRP RESP QL NAA+PROBE: NOT DETECTED
SODIUM SERPL-SCNC: 131 MMOL/L (ref 136–145)
SP GR UR STRIP.AUTO: 1.05 (ref 1–1.03)
TROPONIN, HIGH SENSITIVITY: 8 NG/L (ref 0–14)
TROPONIN, HIGH SENSITIVITY: 9 NG/L (ref 0–14)
UA COMPLETE W REFLEX CULTURE PNL UR: ABNORMAL
UA DIPSTICK W REFLEX MICRO PNL UR: YES
URN SPEC COLLECT METH UR: ABNORMAL
UROBILINOGEN UR STRIP-ACNC: 1 E.U./DL
WBC # BLD AUTO: 6.8 K/UL (ref 4–11)
WBC #/AREA URNS HPF: ABNORMAL /HPF (ref 0–5)

## 2025-03-05 PROCEDURE — 84703 CHORIONIC GONADOTROPIN ASSAY: CPT

## 2025-03-05 PROCEDURE — 84484 ASSAY OF TROPONIN QUANT: CPT

## 2025-03-05 PROCEDURE — 2500000003 HC RX 250 WO HCPCS: Performed by: STUDENT IN AN ORGANIZED HEALTH CARE EDUCATION/TRAINING PROGRAM

## 2025-03-05 PROCEDURE — 85025 COMPLETE CBC W/AUTO DIFF WBC: CPT

## 2025-03-05 PROCEDURE — 96374 THER/PROPH/DIAG INJ IV PUSH: CPT

## 2025-03-05 PROCEDURE — 6370000000 HC RX 637 (ALT 250 FOR IP): Performed by: STUDENT IN AN ORGANIZED HEALTH CARE EDUCATION/TRAINING PROGRAM

## 2025-03-05 PROCEDURE — 87635 SARS-COV-2 COVID-19 AMP PRB: CPT

## 2025-03-05 PROCEDURE — 80053 COMPREHEN METABOLIC PANEL: CPT

## 2025-03-05 PROCEDURE — 96375 TX/PRO/DX INJ NEW DRUG ADDON: CPT

## 2025-03-05 PROCEDURE — 83036 HEMOGLOBIN GLYCOSYLATED A1C: CPT

## 2025-03-05 PROCEDURE — 71046 X-RAY EXAM CHEST 2 VIEWS: CPT

## 2025-03-05 PROCEDURE — 87502 INFLUENZA DNA AMP PROBE: CPT

## 2025-03-05 PROCEDURE — 2580000003 HC RX 258: Performed by: STUDENT IN AN ORGANIZED HEALTH CARE EDUCATION/TRAINING PROGRAM

## 2025-03-05 PROCEDURE — 6360000002 HC RX W HCPCS: Performed by: STUDENT IN AN ORGANIZED HEALTH CARE EDUCATION/TRAINING PROGRAM

## 2025-03-05 PROCEDURE — 99285 EMERGENCY DEPT VISIT HI MDM: CPT

## 2025-03-05 PROCEDURE — 93010 ELECTROCARDIOGRAM REPORT: CPT | Performed by: INTERNAL MEDICINE

## 2025-03-05 PROCEDURE — 93005 ELECTROCARDIOGRAM TRACING: CPT | Performed by: STUDENT IN AN ORGANIZED HEALTH CARE EDUCATION/TRAINING PROGRAM

## 2025-03-05 PROCEDURE — 83690 ASSAY OF LIPASE: CPT

## 2025-03-05 PROCEDURE — 81001 URINALYSIS AUTO W/SCOPE: CPT

## 2025-03-05 RX ORDER — KETOROLAC TROMETHAMINE 15 MG/ML
15 INJECTION, SOLUTION INTRAMUSCULAR; INTRAVENOUS ONCE
Status: COMPLETED | OUTPATIENT
Start: 2025-03-05 | End: 2025-03-05

## 2025-03-05 RX ORDER — 0.9 % SODIUM CHLORIDE 0.9 %
1000 INTRAVENOUS SOLUTION INTRAVENOUS ONCE
Status: COMPLETED | OUTPATIENT
Start: 2025-03-05 | End: 2025-03-05

## 2025-03-05 RX ORDER — ONDANSETRON 2 MG/ML
4 INJECTION INTRAMUSCULAR; INTRAVENOUS ONCE
Status: COMPLETED | OUTPATIENT
Start: 2025-03-05 | End: 2025-03-05

## 2025-03-05 RX ADMIN — KETOROLAC TROMETHAMINE 15 MG: 15 INJECTION, SOLUTION INTRAMUSCULAR; INTRAVENOUS at 12:21

## 2025-03-05 RX ADMIN — FAMOTIDINE 20 MG: 10 INJECTION, SOLUTION INTRAVENOUS at 12:21

## 2025-03-05 RX ADMIN — METFORMIN HYDROCHLORIDE 500 MG: 500 TABLET ORAL at 13:19

## 2025-03-05 RX ADMIN — SODIUM CHLORIDE 1000 ML: 0.9 INJECTION, SOLUTION INTRAVENOUS at 12:22

## 2025-03-05 RX ADMIN — ONDANSETRON 4 MG: 2 INJECTION, SOLUTION INTRAMUSCULAR; INTRAVENOUS at 12:21

## 2025-03-05 ASSESSMENT — PAIN DESCRIPTION - FREQUENCY: FREQUENCY: CONTINUOUS

## 2025-03-05 ASSESSMENT — PAIN - FUNCTIONAL ASSESSMENT: PAIN_FUNCTIONAL_ASSESSMENT: 0-10

## 2025-03-05 ASSESSMENT — PAIN SCALES - GENERAL
PAINLEVEL_OUTOF10: 10
PAINLEVEL_OUTOF10: 3
PAINLEVEL_OUTOF10: 10

## 2025-03-05 ASSESSMENT — PAIN DESCRIPTION - ONSET: ONSET: ON-GOING

## 2025-03-05 ASSESSMENT — PAIN DESCRIPTION - LOCATION: LOCATION: CHEST

## 2025-03-05 ASSESSMENT — PAIN DESCRIPTION - PAIN TYPE: TYPE: ACUTE PAIN

## 2025-03-05 ASSESSMENT — HEART SCORE: ECG: NORMAL

## 2025-03-05 NOTE — ED PROVIDER NOTES
[facility] EMERGENCY DEPARTMENT      EMERGENCY MEDICINE     Pt Name: Stacie Donato  MRN: [id number]  Birthdate 1980  Date of evaluation: 3/5/2025  Provider: Malcolm Kaufman DO    CHIEF COMPLAINT       Chief Complaint   Patient presents with    Chest Pain     Pt arrives via [facility] c/o CP that started about one hour PTA. Pt received full dose Asa and one dose of Nitro en route. Pt states she recently was sick with a fever a 101 the past two days. Pt states she was having N/V/D.     HISTORY OF PRESENT ILLNESS   Stacie Donato is a 44 y.o. female who presents to the emergency department for chest pain.  Patient states that the chest pain started approximate an hour prior to arrival.  Arrived with EMS.  Got a full dose of aspirin and a dose of nitro en route which she states did not do anything with her pain.  When I spoke to her and evaluated her in the room she stated that the chest pain was actually more left upper quadrant and epigastric pain.  States she has never had pain like that before and that she started with that this morning.  She states she had eaten some Chinese food a couple of days ago that she felt made her sick if she began running fevers as well as having 1 episode of emesis.  She does not have any known medical problems and does not follow with a PCP.  No family history of coronary artery disease.  Denies any diaphoresis or episodes of nausea or vomiting this morning.  Not on oral contraception.  No recent travel or surgeries.  No calf pain.        PASTMEDICAL HISTORY     Past Medical History:   Diagnosis Date    Asthma     Fatty liver     Obesity     Psoriasis     Psoriasis        There is no problem list on file for this patient.    SURGICAL HISTORY       Past Surgical History:   Procedure Laterality Date    CHOLECYSTECTOMY         CURRENT MEDICATIONS       Discharge Medication List as of 3/5/2025  1:20 PM        CONTINUE these medications which have NOT CHANGED

## 2025-03-05 NOTE — DISCHARGE INSTRUCTIONS
You were seen today in the emergency department due to chest pain.  Your workup in the emergency department was reassuring, however it did show that you are a diabetic and will require medications for your diabetes or put you at high risk of kidney damage as well as other medical comorbidities.  Please note that a medication was sent with you today from the emergency department, please fill it and take it as prescribed.  Please also note that a referral was sent to an internal medicine clinic for you, please call them today for follow-up appointment as soon as possible please return to the emergency department if you experience any further concerning symptoms.

## 2025-03-07 ENCOUNTER — APPOINTMENT (OUTPATIENT)
Dept: CT IMAGING | Age: 45
DRG: 193 | End: 2025-03-07
Payer: COMMERCIAL

## 2025-03-07 ENCOUNTER — HOSPITAL ENCOUNTER (INPATIENT)
Age: 45
LOS: 11 days | Discharge: HOME | DRG: 193 | End: 2025-03-18
Attending: HOSPITALIST | Admitting: HOSPITALIST
Payer: COMMERCIAL

## 2025-03-07 DIAGNOSIS — I26.90 ACUTE SEPTIC PULMONARY EMBOLISM, UNSPECIFIED WHETHER ACUTE COR PULMONALE PRESENT (HCC): Primary | ICD-10-CM

## 2025-03-07 DIAGNOSIS — Z79.4 TYPE 2 DIABETES MELLITUS WITH HYPERGLYCEMIA, WITH LONG-TERM CURRENT USE OF INSULIN (HCC): ICD-10-CM

## 2025-03-07 DIAGNOSIS — R07.9 LEFT-SIDED CHEST PAIN: ICD-10-CM

## 2025-03-07 DIAGNOSIS — J90 PLEURISY WITH EFFUSION: ICD-10-CM

## 2025-03-07 DIAGNOSIS — R06.02 SHORTNESS OF BREATH: ICD-10-CM

## 2025-03-07 DIAGNOSIS — E11.65 TYPE 2 DIABETES MELLITUS WITH HYPERGLYCEMIA, WITH LONG-TERM CURRENT USE OF INSULIN (HCC): ICD-10-CM

## 2025-03-07 LAB
ALBUMIN SERPL-MCNC: 3.9 G/DL (ref 3.4–5)
ALBUMIN/GLOB SERPL: 1.1 {RATIO} (ref 1.1–2.2)
ALP SERPL-CCNC: 137 U/L (ref 40–129)
ALT SERPL-CCNC: 38 U/L (ref 10–40)
ANION GAP SERPL CALCULATED.3IONS-SCNC: 14 MMOL/L (ref 3–16)
AST SERPL-CCNC: 41 U/L (ref 15–37)
BASOPHILS # BLD: 0.1 K/UL (ref 0–0.2)
BASOPHILS NFR BLD: 1 %
BILIRUB SERPL-MCNC: 0.8 MG/DL (ref 0–1)
BUN SERPL-MCNC: 13 MG/DL (ref 7–20)
CALCIUM SERPL-MCNC: 10 MG/DL (ref 8.3–10.6)
CHLORIDE SERPL-SCNC: 97 MMOL/L (ref 99–110)
CO2 SERPL-SCNC: 24 MMOL/L (ref 21–32)
CREAT SERPL-MCNC: 0.6 MG/DL (ref 0.6–1.1)
CRP SERPL-MCNC: 69.1 MG/L (ref 0–5.1)
DEPRECATED RDW RBC AUTO: 12 % (ref 12.4–15.4)
EOSINOPHIL # BLD: 0.2 K/UL (ref 0–0.6)
EOSINOPHIL NFR BLD: 1.8 %
GFR SERPLBLD CREATININE-BSD FMLA CKD-EPI: >90 ML/MIN/{1.73_M2}
GLUCOSE BLD-MCNC: 223 MG/DL (ref 70–99)
GLUCOSE SERPL-MCNC: 257 MG/DL (ref 70–99)
HCG SERPL QL: NEGATIVE
HCT VFR BLD AUTO: 42.6 % (ref 36–48)
HGB BLD-MCNC: 14.6 G/DL (ref 12–16)
LIPASE SERPL-CCNC: 19 U/L (ref 13–60)
LYMPHOCYTES # BLD: 1.4 K/UL (ref 1–5.1)
LYMPHOCYTES NFR BLD: 16.6 %
MCH RBC QN AUTO: 33.4 PG (ref 26–34)
MCHC RBC AUTO-ENTMCNC: 34.3 G/DL (ref 31–36)
MCV RBC AUTO: 97.4 FL (ref 80–100)
MONOCYTES # BLD: 0.7 K/UL (ref 0–1.3)
MONOCYTES NFR BLD: 8.6 %
NEUTROPHILS # BLD: 6.1 K/UL (ref 1.7–7.7)
NEUTROPHILS NFR BLD: 72 %
PERFORMED ON: ABNORMAL
PLATELET # BLD AUTO: 219 K/UL (ref 135–450)
PMV BLD AUTO: 7.6 FL (ref 5–10.5)
POTASSIUM SERPL-SCNC: 4.3 MMOL/L (ref 3.5–5.1)
PROCALCITONIN SERPL IA-MCNC: 0.06 NG/ML (ref 0–0.15)
PROT SERPL-MCNC: 7.5 G/DL (ref 6.4–8.2)
RBC # BLD AUTO: 4.37 M/UL (ref 4–5.2)
REASON FOR REJECTION: NORMAL
REJECTED TEST: NORMAL
SODIUM SERPL-SCNC: 135 MMOL/L (ref 136–145)
WBC # BLD AUTO: 8.5 K/UL (ref 4–11)

## 2025-03-07 PROCEDURE — 99285 EMERGENCY DEPT VISIT HI MDM: CPT

## 2025-03-07 PROCEDURE — 84145 PROCALCITONIN (PCT): CPT

## 2025-03-07 PROCEDURE — 96374 THER/PROPH/DIAG INJ IV PUSH: CPT

## 2025-03-07 PROCEDURE — 6360000004 HC RX CONTRAST MEDICATION: Performed by: PHYSICIAN ASSISTANT

## 2025-03-07 PROCEDURE — 6360000002 HC RX W HCPCS: Performed by: HOSPITALIST

## 2025-03-07 PROCEDURE — 6370000000 HC RX 637 (ALT 250 FOR IP): Performed by: PHYSICIAN ASSISTANT

## 2025-03-07 PROCEDURE — 80053 COMPREHEN METABOLIC PANEL: CPT

## 2025-03-07 PROCEDURE — 84703 CHORIONIC GONADOTROPIN ASSAY: CPT

## 2025-03-07 PROCEDURE — 6360000002 HC RX W HCPCS: Performed by: PHYSICIAN ASSISTANT

## 2025-03-07 PROCEDURE — 6370000000 HC RX 637 (ALT 250 FOR IP): Performed by: HOSPITALIST

## 2025-03-07 PROCEDURE — 74177 CT ABD & PELVIS W/CONTRAST: CPT

## 2025-03-07 PROCEDURE — 86140 C-REACTIVE PROTEIN: CPT

## 2025-03-07 PROCEDURE — 87040 BLOOD CULTURE FOR BACTERIA: CPT

## 2025-03-07 PROCEDURE — 83690 ASSAY OF LIPASE: CPT

## 2025-03-07 PROCEDURE — 85025 COMPLETE CBC W/AUTO DIFF WBC: CPT

## 2025-03-07 PROCEDURE — 2500000003 HC RX 250 WO HCPCS: Performed by: HOSPITALIST

## 2025-03-07 PROCEDURE — 36415 COLL VENOUS BLD VENIPUNCTURE: CPT

## 2025-03-07 PROCEDURE — 83930 ASSAY OF BLOOD OSMOLALITY: CPT

## 2025-03-07 PROCEDURE — 1200000000 HC SEMI PRIVATE

## 2025-03-07 PROCEDURE — 71260 CT THORAX DX C+: CPT

## 2025-03-07 RX ORDER — MAGNESIUM SULFATE IN WATER 40 MG/ML
2000 INJECTION, SOLUTION INTRAVENOUS PRN
Status: DISCONTINUED | OUTPATIENT
Start: 2025-03-07 | End: 2025-03-18 | Stop reason: HOSPADM

## 2025-03-07 RX ORDER — GUAIFENESIN/DEXTROMETHORPHAN 100-10MG/5
5 SYRUP ORAL EVERY 4 HOURS PRN
Status: DISCONTINUED | OUTPATIENT
Start: 2025-03-07 | End: 2025-03-10

## 2025-03-07 RX ORDER — INSULIN LISPRO 100 [IU]/ML
0-8 INJECTION, SOLUTION INTRAVENOUS; SUBCUTANEOUS
Status: DISCONTINUED | OUTPATIENT
Start: 2025-03-07 | End: 2025-03-18 | Stop reason: HOSPADM

## 2025-03-07 RX ORDER — SODIUM CHLORIDE 0.9 % (FLUSH) 0.9 %
5-40 SYRINGE (ML) INJECTION EVERY 12 HOURS SCHEDULED
Status: DISCONTINUED | OUTPATIENT
Start: 2025-03-07 | End: 2025-03-18 | Stop reason: HOSPADM

## 2025-03-07 RX ORDER — ONDANSETRON 4 MG/1
4 TABLET, ORALLY DISINTEGRATING ORAL EVERY 8 HOURS PRN
Status: DISCONTINUED | OUTPATIENT
Start: 2025-03-07 | End: 2025-03-18 | Stop reason: HOSPADM

## 2025-03-07 RX ORDER — POLYETHYLENE GLYCOL 3350 17 G/17G
17 POWDER, FOR SOLUTION ORAL DAILY PRN
Status: DISCONTINUED | OUTPATIENT
Start: 2025-03-07 | End: 2025-03-18 | Stop reason: HOSPADM

## 2025-03-07 RX ORDER — OXYCODONE AND ACETAMINOPHEN 5; 325 MG/1; MG/1
1 TABLET ORAL ONCE
Status: COMPLETED | OUTPATIENT
Start: 2025-03-07 | End: 2025-03-07

## 2025-03-07 RX ORDER — MORPHINE SULFATE 2 MG/ML
2 INJECTION, SOLUTION INTRAMUSCULAR; INTRAVENOUS ONCE
Status: COMPLETED | OUTPATIENT
Start: 2025-03-07 | End: 2025-03-07

## 2025-03-07 RX ORDER — ACETAMINOPHEN 325 MG/1
650 TABLET ORAL EVERY 6 HOURS PRN
Status: DISCONTINUED | OUTPATIENT
Start: 2025-03-07 | End: 2025-03-18 | Stop reason: HOSPADM

## 2025-03-07 RX ORDER — LEVOFLOXACIN 5 MG/ML
750 INJECTION, SOLUTION INTRAVENOUS EVERY 24 HOURS
Status: DISCONTINUED | OUTPATIENT
Start: 2025-03-07 | End: 2025-03-09

## 2025-03-07 RX ORDER — IOPAMIDOL 755 MG/ML
75 INJECTION, SOLUTION INTRAVASCULAR
Status: COMPLETED | OUTPATIENT
Start: 2025-03-07 | End: 2025-03-07

## 2025-03-07 RX ORDER — ACETAMINOPHEN 650 MG/1
650 SUPPOSITORY RECTAL EVERY 6 HOURS PRN
Status: DISCONTINUED | OUTPATIENT
Start: 2025-03-07 | End: 2025-03-18 | Stop reason: HOSPADM

## 2025-03-07 RX ORDER — OXYCODONE AND ACETAMINOPHEN 5; 325 MG/1; MG/1
1 TABLET ORAL EVERY 6 HOURS PRN
Status: DISCONTINUED | OUTPATIENT
Start: 2025-03-07 | End: 2025-03-18 | Stop reason: HOSPADM

## 2025-03-07 RX ORDER — KETOROLAC TROMETHAMINE 15 MG/ML
15 INJECTION, SOLUTION INTRAMUSCULAR; INTRAVENOUS EVERY 6 HOURS PRN
Status: DISPENSED | OUTPATIENT
Start: 2025-03-07 | End: 2025-03-08

## 2025-03-07 RX ORDER — ONDANSETRON 4 MG/1
4 TABLET, ORALLY DISINTEGRATING ORAL ONCE
Status: COMPLETED | OUTPATIENT
Start: 2025-03-07 | End: 2025-03-07

## 2025-03-07 RX ORDER — SODIUM CHLORIDE 9 MG/ML
INJECTION, SOLUTION INTRAVENOUS PRN
Status: DISCONTINUED | OUTPATIENT
Start: 2025-03-07 | End: 2025-03-18 | Stop reason: HOSPADM

## 2025-03-07 RX ORDER — ONDANSETRON 2 MG/ML
4 INJECTION INTRAMUSCULAR; INTRAVENOUS EVERY 6 HOURS PRN
Status: DISCONTINUED | OUTPATIENT
Start: 2025-03-07 | End: 2025-03-18 | Stop reason: HOSPADM

## 2025-03-07 RX ORDER — POTASSIUM CHLORIDE 1500 MG/1
40 TABLET, EXTENDED RELEASE ORAL PRN
Status: DISCONTINUED | OUTPATIENT
Start: 2025-03-07 | End: 2025-03-18 | Stop reason: HOSPADM

## 2025-03-07 RX ORDER — SODIUM CHLORIDE 0.9 % (FLUSH) 0.9 %
5-40 SYRINGE (ML) INJECTION PRN
Status: DISCONTINUED | OUTPATIENT
Start: 2025-03-07 | End: 2025-03-18 | Stop reason: HOSPADM

## 2025-03-07 RX ORDER — ENOXAPARIN SODIUM 100 MG/ML
30 INJECTION SUBCUTANEOUS 2 TIMES DAILY
Status: DISCONTINUED | OUTPATIENT
Start: 2025-03-07 | End: 2025-03-18 | Stop reason: HOSPADM

## 2025-03-07 RX ORDER — MORPHINE SULFATE 2 MG/ML
1 INJECTION, SOLUTION INTRAMUSCULAR; INTRAVENOUS EVERY 4 HOURS PRN
Status: DISCONTINUED | OUTPATIENT
Start: 2025-03-07 | End: 2025-03-18 | Stop reason: HOSPADM

## 2025-03-07 RX ORDER — IBUPROFEN 400 MG/1
800 TABLET, FILM COATED ORAL ONCE
Status: COMPLETED | OUTPATIENT
Start: 2025-03-07 | End: 2025-03-07

## 2025-03-07 RX ORDER — POTASSIUM CHLORIDE 7.45 MG/ML
10 INJECTION INTRAVENOUS PRN
Status: DISCONTINUED | OUTPATIENT
Start: 2025-03-07 | End: 2025-03-18 | Stop reason: HOSPADM

## 2025-03-07 RX ORDER — ALBUTEROL SULFATE 0.83 MG/ML
2.5 SOLUTION RESPIRATORY (INHALATION) EVERY 4 HOURS PRN
Status: DISCONTINUED | OUTPATIENT
Start: 2025-03-07 | End: 2025-03-18 | Stop reason: HOSPADM

## 2025-03-07 RX ADMIN — IBUPROFEN 800 MG: 400 TABLET, FILM COATED ORAL at 11:04

## 2025-03-07 RX ADMIN — MORPHINE SULFATE 1 MG: 2 INJECTION, SOLUTION INTRAMUSCULAR; INTRAVENOUS at 21:21

## 2025-03-07 RX ADMIN — SODIUM CHLORIDE, PRESERVATIVE FREE 10 ML: 5 INJECTION INTRAVENOUS at 21:21

## 2025-03-07 RX ADMIN — INSULIN LISPRO 2 UNITS: 100 INJECTION, SOLUTION INTRAVENOUS; SUBCUTANEOUS at 21:23

## 2025-03-07 RX ADMIN — OXYCODONE HYDROCHLORIDE AND ACETAMINOPHEN 1 TABLET: 5; 325 TABLET ORAL at 18:57

## 2025-03-07 RX ADMIN — ENOXAPARIN SODIUM 30 MG: 100 INJECTION SUBCUTANEOUS at 21:22

## 2025-03-07 RX ADMIN — LEVOFLOXACIN 750 MG: 5 INJECTION, SOLUTION INTRAVENOUS at 21:31

## 2025-03-07 RX ADMIN — IOPAMIDOL 75 ML: 755 INJECTION, SOLUTION INTRAVENOUS at 14:24

## 2025-03-07 RX ADMIN — OXYCODONE HYDROCHLORIDE AND ACETAMINOPHEN 1 TABLET: 5; 325 TABLET ORAL at 11:04

## 2025-03-07 RX ADMIN — ACETAMINOPHEN 650 MG: 325 TABLET ORAL at 23:02

## 2025-03-07 RX ADMIN — ONDANSETRON 4 MG: 4 TABLET, ORALLY DISINTEGRATING ORAL at 11:04

## 2025-03-07 RX ADMIN — MORPHINE SULFATE 2 MG: 2 INJECTION, SOLUTION INTRAMUSCULAR; INTRAVENOUS at 16:58

## 2025-03-07 RX ADMIN — IOPAMIDOL 75 ML: 755 INJECTION, SOLUTION INTRAVENOUS at 12:48

## 2025-03-07 ASSESSMENT — PAIN - FUNCTIONAL ASSESSMENT
PAIN_FUNCTIONAL_ASSESSMENT: ACTIVITIES ARE NOT PREVENTED
PAIN_FUNCTIONAL_ASSESSMENT: PREVENTS OR INTERFERES SOME ACTIVE ACTIVITIES AND ADLS
PAIN_FUNCTIONAL_ASSESSMENT: PREVENTS OR INTERFERES SOME ACTIVE ACTIVITIES AND ADLS
PAIN_FUNCTIONAL_ASSESSMENT: 0-10
PAIN_FUNCTIONAL_ASSESSMENT: PREVENTS OR INTERFERES SOME ACTIVE ACTIVITIES AND ADLS

## 2025-03-07 ASSESSMENT — PAIN DESCRIPTION - DESCRIPTORS
DESCRIPTORS: SHARP

## 2025-03-07 ASSESSMENT — PAIN DESCRIPTION - ORIENTATION
ORIENTATION: UPPER;LEFT
ORIENTATION: LEFT;UPPER
ORIENTATION: LEFT
ORIENTATION: LEFT;UPPER

## 2025-03-07 ASSESSMENT — PAIN SCALES - GENERAL
PAINLEVEL_OUTOF10: 10
PAINLEVEL_OUTOF10: 9
PAINLEVEL_OUTOF10: 10
PAINLEVEL_OUTOF10: 10
PAINLEVEL_OUTOF10: 9
PAINLEVEL_OUTOF10: 8

## 2025-03-07 ASSESSMENT — PAIN DESCRIPTION - LOCATION
LOCATION: ABDOMEN
LOCATION: ABDOMEN
LOCATION: ABDOMEN;FLANK
LOCATION: ABDOMEN

## 2025-03-07 ASSESSMENT — PAIN DESCRIPTION - PAIN TYPE: TYPE: ACUTE PAIN

## 2025-03-07 NOTE — ED NOTES
Patient coughing up blood. Patient states it just started. Bright red blood noted. Sukumar LEROY aware.

## 2025-03-07 NOTE — ED PROVIDER NOTES
**ADVANCED PRACTICE PROVIDER, I HAVE EVALUATED THIS PATIENT**        [facility] EMERGENCY DEPARTMENT  EMERGENCY DEPARTMENT ENCOUNTER      Pt Name: Stacie Donato  MRN:[id number]  Birthdate 1980  Date of evaluation: 3/7/2025  Provider: Sukumar Sanabria PA-C  Note Started: 3:50 PM EST 3/7/25        Chief Complaint:    Chief Complaint   Patient presents with    Abdominal Pain    Flank Pain     Pt c/o L flank abd pain since Wednesday; sts seen then also in ED here; reports pain is \"sharp\" and feels nauseated, alert and oriented, skin pink warm and dry, resp even and unlabored.         Nursing Notes, Past Medical Hx, Past Surgical Hx, Social Hx, Allergies, and Family Hx were all reviewed and agreed with or any disagreements were addressed in the HPI.    HPI: (Location, Duration, Timing, Severity, Quality, Assoc Sx, Context, Modifying factors)    History From: Patient  Limitations to history : None    Social Determinants Significantly Affecting Health : None    Chief Complaint of left side flank pain that radiates to the left upper side of chest.  Patient stated started few days ago.  She was seen here on the fifth for the same said his did a chest x-ray that showed did not show anything any syndrome and give her anything for pain.  She is back today.  She denies fever.  No nausea vomiting.  No cardiac history.  No history of kidney stones.  She denies any pain with urination.  She gets some pain with movement.  No other complaint.    This is a  44 y.o. female who presents to the emergency room with the above complaint.    PastMedical/Surgical History:      Diagnosis Date    Asthma     Fatty liver     Obesity     Psoriasis     Psoriasis          Procedure Laterality Date    CHOLECYSTECTOMY         Medications:  Previous Medications    ALBUTEROL SULFATE HFA (VENTOLIN HFA) 108 (90 BASE) MCG/ACT INHALER    Inhale 2 puffs into the lungs 4 times daily as needed for Wheezing    DEXTRAN 70-HYPROMELLOSE (ARTIFICIAL  MEDICATIONS:  Discontinued Medications    No medications on file              (Please note the MDM and HPI sections of this note were completed with a voice recognition program.  Efforts were made to edit the dictations but occasionally words are mis-transcribed.)    Electronically signed, Sukumar Sanabria PA-C,          Sukumar Sanabria PA-C  03/07/25 1376

## 2025-03-07 NOTE — H&P
V2.0  History and Physical      Name:  Stacie Donato /Age/Sex: 1980  (44 y.o. female)   MRN & CSN:  [id number] & [id number] Encounter Date/Time: 3/7/2025 5:31 PM EST   Location:  [address] [place] [address] PCP: No primary care provider on file.       Hospital Day: 1    Assessment and Plan:   Stacie Donato is a 44 y.o. female with a pmh of  who presents with Pleural effusion on left    Hospital Problems             Last Modified POA    * (Principal) Pleural effusion on left 3/7/2025 Yes       Plan:          Admit inpatient status  Telemetry monitering    Patient having left lower rib area pleuritic kind of chest pain which got exacerbated with deep breathing and pain is sharp in nature  Pain it seems to be from left-sided pleural effusion and underlying pathology    Will give empiric IV antibiotic IV levofloxacin  Check Pro-Jaxson check sed rate and CRP  Check pneumonia panel  Will consult infectious disease and pulmonology for assistance  DuoNebs as needed  Morphine IV as needed for chest pain  Monitor CBC and BMP    Chronic conditions :      H/o psoriasis : outpatient management    Obesity : outpatient management      DVT ppx  : lovenox    Diet : regular diet    Code status  : full code    Advanced care planning was discussed with patient for a total of 16 minutes.  Full code, DNR CC, DNR CCA, power of  and living will were discussed.  Patient elected to be a full code.             Disposition:   Current Living situation: home  Expected Disposition: home  Estimated D/C: 2-3 days    Diet No diet orders on file   DVT Prophylaxis [] Lovenox, []  Heparin, [] SCDs, [] Ambulation,  [] Eliquis, [] Xarelto, [] Coumadin   Code Status No Order   Surrogate Decision Maker/ POA        Personally reviewed Lab Studies and Imaging      Discussed management of the case with ED  who recommended Hospital admission     EKG interpreted personally and results : no STEMI     Imaging that was interpreted personally includes  CXR  and     BILIRUBINUR Negative 03/05/2025 12:19 PM    BLOODU Negative 03/05/2025 12:19 PM    GLUCOSEU >=1000 03/05/2025 12:19 PM    GLUCOSEU Negative 03/05/2012 06:50 PM    KETUA TRACE 03/05/2025 12:19 PM     Urine Cultures: No results found for: \"LABURIN\"  Blood Cultures: No results found for: \"BC\"  No results found for: \"BLOODCULT2\"  Organism: No results found for: \"ORG\"    Imaging/Diagnostics Last 24 Hours   CT CHEST PULMONARY EMBOLISM W CONTRAST    Result Date: 3/7/2025  EXAMINATION: CTA OF THE CHEST 3/7/2025 2:17 pm TECHNIQUE: CTA of the chest was performed after the administration of intravenous contrast.  Multiplanar reformatted images are provided for review.  MIP images are provided for review. Automated exposure control, iterative reconstruction, and/or weight based adjustment of the mA/kV was utilized to reduce the radiation dose to as low as reasonably achievable. COMPARISON: 03/07/2025 and 03/05/2025. HISTORY: ORDERING SYSTEM PROVIDED HISTORY: Rule out septic emboli and chest x-ray TECHNOLOGIST PROVIDED HISTORY: Reason for exam:->Rule out septic emboli and chest x-ray Additional Contrast?->1 FINDINGS: Pulmonary Arteries: Mildly limited evaluation for pulmonary embolism due to suboptimal contrast bolus enhancement and respiratory motion artifact.  No large central pulmonary embolism identified.  Main pulmonary artery is mildly dilated. Mediastinum: No evidence of mediastinal lymphadenopathy.  The heart and pericardium demonstrate no acute abnormality.  Question of mild cardiac left ventricular hypertrophy.  There is no acute abnormality of the thoracic aorta. Lungs/pleura: Multiple soft tissue nodules bilaterally with surrounding ground-glass density, largest on the right within the upper lobe measuring approximately 1 cm (series 7/image 73), and largest on the left within the lower lobes measuring approximately 1.6 cm in diameter (series 7/image 47). Largest left-sided lesion demonstrates intrinsic air  27). Organs: Status post cholecystectomy.  The liver is slightly decreased in attenuation.  The liver, spleen, pancreas and adrenal glands are without acute focal abnormality.  No renal or ureteral calculus.  No hydronephrosis. Mild nonspecific perinephric stranding bilaterally.  No biliary duct dilation. GI/Bowel: No dilated loops of bowel or bowel wall thickening.  Colonic diverticulosis without acute diverticulitis.  No free air.  The appendix is normal. Pelvis: The bladder is not well-distended.  No pathologically enlarged adenopathy or free fluid.  There is likely a subserosal left lower uterine segment fibroid measuring up to at least 6.0 cm.  The uterus and ovaries are otherwise grossly unremarkable. Peritoneum/Retroperitoneum: The aorta is normal in caliber with mild atherosclerosis.  The celiac axis, SMA and MICHAEL are patent.  The portal venous system is patent.  No ascites or drainable fluid collection.  No pathologically enlarged adenopathy. Bones/Soft Tissues: No acute findings in the bones or soft tissues.     1. No acute abdominal or pelvic abnormality. 2. Mild hepatic steatosis. 3. 6.0 cm subserosal left lower uterine segment fibroid. 4. Trace left pleural effusion with nodular opacities in the left lower lobe the largest measuring up to 16 mm. Findings may be infectious or inflammatory in etiology.  Septic emboli could be a possibility in the appropriate clinical setting.  Recommend dedicated CT of the chest for further evaluation.     XR CHEST (2 VW)    Result Date: 3/5/2025  EXAMINATION: TWO XRAY VIEWS OF THE CHEST 3/5/2025 11:16 am COMPARISON: 06/20/2023 HISTORY: ORDERING SYSTEM PROVIDED HISTORY: CP TECHNOLOGIST PROVIDED HISTORY: Reason for exam:->CP FINDINGS: The lungs are clear.  The cardiac silhouette is within normal limits.  There is no pneumothorax or pleural effusion.     1.  No acute abnormality.         Electronically signed by Mckenna Shcmidt MD on 3/7/2025 at 5:31 PM

## 2025-03-08 ENCOUNTER — APPOINTMENT (OUTPATIENT)
Age: 45
DRG: 193 | End: 2025-03-08
Attending: HOSPITALIST
Payer: COMMERCIAL

## 2025-03-08 LAB
AMPHETAMINES UR QL SCN>1000 NG/ML: ABNORMAL
ANION GAP SERPL CALCULATED.3IONS-SCNC: 9 MMOL/L (ref 3–16)
BARBITURATES UR QL SCN>200 NG/ML: ABNORMAL
BASOPHILS # BLD: 0 K/UL (ref 0–0.2)
BASOPHILS NFR BLD: 0.5 %
BENZODIAZ UR QL SCN>200 NG/ML: ABNORMAL
BUN SERPL-MCNC: 10 MG/DL (ref 7–20)
CALCIUM SERPL-MCNC: 9.3 MG/DL (ref 8.3–10.6)
CANNABINOIDS UR QL SCN>50 NG/ML: ABNORMAL
CHLORIDE SERPL-SCNC: 95 MMOL/L (ref 99–110)
CO2 SERPL-SCNC: 25 MMOL/L (ref 21–32)
COCAINE UR QL SCN: ABNORMAL
CREAT SERPL-MCNC: 0.5 MG/DL (ref 0.6–1.1)
DEPRECATED RDW RBC AUTO: 12.3 % (ref 12.4–15.4)
DRUG SCREEN COMMENT UR-IMP: ABNORMAL
ECHO AO ASC DIAM: 3.6 CM
ECHO AO ASCENDING AORTA INDEX: 1.77 CM/M2
ECHO AO ROOT DIAM: 3.3 CM
ECHO AO ROOT INDEX: 1.63 CM/M2
ECHO AV AREA PEAK VELOCITY: 2.2 CM2
ECHO AV AREA VTI: 2.2 CM2
ECHO AV AREA/BSA PEAK VELOCITY: 1.1 CM2/M2
ECHO AV AREA/BSA VTI: 1.1 CM2/M2
ECHO AV MEAN GRADIENT: 6 MMHG
ECHO AV MEAN VELOCITY: 1.1 M/S
ECHO AV PEAK GRADIENT: 11 MMHG
ECHO AV PEAK VELOCITY: 1.7 M/S
ECHO AV VELOCITY RATIO: 0.65
ECHO AV VTI: 32.2 CM
ECHO BSA: 2.17 M2
ECHO EST RA PRESSURE: 3 MMHG
ECHO IVC PROX: 2 CM
ECHO LA AREA 2C: 15.3 CM2
ECHO LA AREA 4C: 17.9 CM2
ECHO LA MAJOR AXIS: 5.2 CM
ECHO LA MINOR AXIS: 5.1 CM
ECHO LA VOL BP: 43 ML (ref 22–52)
ECHO LA VOL MOD A2C: 36 ML (ref 22–52)
ECHO LA VOL MOD A4C: 49 ML (ref 22–52)
ECHO LA VOL/BSA BIPLANE: 21 ML/M2 (ref 16–34)
ECHO LA VOLUME INDEX MOD A2C: 18 ML/M2 (ref 16–34)
ECHO LA VOLUME INDEX MOD A4C: 24 ML/M2 (ref 16–34)
ECHO LV E' LATERAL VELOCITY: 10.3 CM/S
ECHO LV E' SEPTAL VELOCITY: 7.29 CM/S
ECHO LV EF PHYSICIAN: 63 %
ECHO LV FRACTIONAL SHORTENING: 43 % (ref 28–44)
ECHO LV INTERNAL DIMENSION DIASTOLE INDEX: 1.97 CM/M2
ECHO LV INTERNAL DIMENSION DIASTOLIC: 4 CM (ref 3.9–5.3)
ECHO LV INTERNAL DIMENSION SYSTOLIC INDEX: 1.13 CM/M2
ECHO LV INTERNAL DIMENSION SYSTOLIC: 2.3 CM
ECHO LV IVSD: 0.9 CM (ref 0.6–0.9)
ECHO LV MASS 2D: 109.7 G (ref 67–162)
ECHO LV MASS INDEX 2D: 54 G/M2 (ref 43–95)
ECHO LV POSTERIOR WALL DIASTOLIC: 0.9 CM (ref 0.6–0.9)
ECHO LV RELATIVE WALL THICKNESS RATIO: 0.45
ECHO LVOT AREA: 3.1 CM2
ECHO LVOT AV VTI INDEX: 0.68
ECHO LVOT DIAM: 2 CM
ECHO LVOT MEAN GRADIENT: 3 MMHG
ECHO LVOT PEAK GRADIENT: 5 MMHG
ECHO LVOT PEAK VELOCITY: 1.1 M/S
ECHO LVOT STROKE VOLUME INDEX: 33.9 ML/M2
ECHO LVOT SV: 68.8 ML
ECHO LVOT VTI: 21.9 CM
ECHO MV A VELOCITY: 0.85 M/S
ECHO MV AREA VTI: 2.5 CM2
ECHO MV E DECELERATION TIME (DT): 287 MS
ECHO MV E VELOCITY: 0.71 M/S
ECHO MV E/A RATIO: 0.84
ECHO MV E/E' LATERAL: 6.89
ECHO MV E/E' RATIO (AVERAGED): 8.32
ECHO MV E/E' SEPTAL: 9.74
ECHO MV LVOT VTI INDEX: 1.25
ECHO MV MAX VELOCITY: 0.9 M/S
ECHO MV MEAN GRADIENT: 1 MMHG
ECHO MV MEAN VELOCITY: 0.5 M/S
ECHO MV PEAK GRADIENT: 3 MMHG
ECHO MV VTI: 27.3 CM
ECHO PV MAX VELOCITY: 1.1 M/S
ECHO PV PEAK GRADIENT: 5 MMHG
ECHO RIGHT VENTRICULAR SYSTOLIC PRESSURE (RVSP): 9 MMHG
ECHO RV FREE WALL PEAK S': 12.7 CM/S
ECHO RV TAPSE: 3.1 CM (ref 1.7–?)
ECHO TV REGURGITANT MAX VELOCITY: 1.2 M/S
ECHO TV REGURGITANT PEAK GRADIENT: 6 MMHG
EOSINOPHIL # BLD: 0.2 K/UL (ref 0–0.6)
EOSINOPHIL NFR BLD: 1.8 %
ERYTHROCYTE [SEDIMENTATION RATE] IN BLOOD BY WESTERGREN METHOD: 60 MM/HR (ref 0–20)
EST. AVERAGE GLUCOSE BLD GHB EST-MCNC: 248.9 MG/DL
FENTANYL SCREEN, URINE: ABNORMAL
GFR SERPLBLD CREATININE-BSD FMLA CKD-EPI: >90 ML/MIN/{1.73_M2}
GLUCOSE BLD-MCNC: 240 MG/DL (ref 70–99)
GLUCOSE BLD-MCNC: 276 MG/DL (ref 70–99)
GLUCOSE BLD-MCNC: 277 MG/DL (ref 70–99)
GLUCOSE BLD-MCNC: 340 MG/DL (ref 70–99)
GLUCOSE SERPL-MCNC: 250 MG/DL (ref 70–99)
HBA1C MFR BLD: 10.3 %
HCT VFR BLD AUTO: 37.9 % (ref 36–48)
HGB BLD-MCNC: 13.1 G/DL (ref 12–16)
LYMPHOCYTES # BLD: 1.3 K/UL (ref 1–5.1)
LYMPHOCYTES NFR BLD: 15.4 %
MCH RBC QN AUTO: 33.1 PG (ref 26–34)
MCHC RBC AUTO-ENTMCNC: 34.7 G/DL (ref 31–36)
MCV RBC AUTO: 95.4 FL (ref 80–100)
METHADONE UR QL SCN>300 NG/ML: ABNORMAL
MONOCYTES # BLD: 0.8 K/UL (ref 0–1.3)
MONOCYTES NFR BLD: 8.8 %
MRSA DNA SPEC QL NAA+PROBE: ABNORMAL
NEUTROPHILS # BLD: 6.3 K/UL (ref 1.7–7.7)
NEUTROPHILS NFR BLD: 73.5 %
OPIATES UR QL SCN>300 NG/ML: POSITIVE
ORGANISM: ABNORMAL
OXYCODONE UR QL SCN: POSITIVE
PCP UR QL SCN>25 NG/ML: ABNORMAL
PERFORMED ON: ABNORMAL
PH UR STRIP: 5.5 [PH]
PLATELET # BLD AUTO: 205 K/UL (ref 135–450)
PMV BLD AUTO: 7.6 FL (ref 5–10.5)
POTASSIUM SERPL-SCNC: 4.2 MMOL/L (ref 3.5–5.1)
PROCALCITONIN SERPL IA-MCNC: 0.05 NG/ML (ref 0–0.15)
RBC # BLD AUTO: 3.97 M/UL (ref 4–5.2)
SODIUM SERPL-SCNC: 129 MMOL/L (ref 136–145)
WBC # BLD AUTO: 8.6 K/UL (ref 4–11)

## 2025-03-08 PROCEDURE — 87449 NOS EACH ORGANISM AG IA: CPT

## 2025-03-08 PROCEDURE — 36415 COLL VENOUS BLD VENIPUNCTURE: CPT

## 2025-03-08 PROCEDURE — 80307 DRUG TEST PRSMV CHEM ANLYZR: CPT

## 2025-03-08 PROCEDURE — 94760 N-INVAS EAR/PLS OXIMETRY 1: CPT

## 2025-03-08 PROCEDURE — 6370000000 HC RX 637 (ALT 250 FOR IP): Performed by: HOSPITALIST

## 2025-03-08 PROCEDURE — 6360000002 HC RX W HCPCS: Performed by: INTERNAL MEDICINE

## 2025-03-08 PROCEDURE — 6360000002 HC RX W HCPCS: Performed by: HOSPITALIST

## 2025-03-08 PROCEDURE — 6360000002 HC RX W HCPCS

## 2025-03-08 PROCEDURE — 2580000003 HC RX 258: Performed by: HOSPITALIST

## 2025-03-08 PROCEDURE — 93306 TTE W/DOPPLER COMPLETE: CPT

## 2025-03-08 PROCEDURE — 87641 MR-STAPH DNA AMP PROBE: CPT

## 2025-03-08 PROCEDURE — 93306 TTE W/DOPPLER COMPLETE: CPT | Performed by: INTERNAL MEDICINE

## 2025-03-08 PROCEDURE — 2500000003 HC RX 250 WO HCPCS: Performed by: HOSPITALIST

## 2025-03-08 PROCEDURE — 83036 HEMOGLOBIN GLYCOSYLATED A1C: CPT

## 2025-03-08 PROCEDURE — 85652 RBC SED RATE AUTOMATED: CPT

## 2025-03-08 PROCEDURE — 6370000000 HC RX 637 (ALT 250 FOR IP): Performed by: INTERNAL MEDICINE

## 2025-03-08 PROCEDURE — 84145 PROCALCITONIN (PCT): CPT

## 2025-03-08 PROCEDURE — 85025 COMPLETE CBC W/AUTO DIFF WBC: CPT

## 2025-03-08 PROCEDURE — 99223 1ST HOSP IP/OBS HIGH 75: CPT | Performed by: INTERNAL MEDICINE

## 2025-03-08 PROCEDURE — 80048 BASIC METABOLIC PNL TOTAL CA: CPT

## 2025-03-08 PROCEDURE — 1200000000 HC SEMI PRIVATE

## 2025-03-08 RX ORDER — LINEZOLID 2 MG/ML
600 INJECTION, SOLUTION INTRAVENOUS EVERY 12 HOURS
Status: DISCONTINUED | OUTPATIENT
Start: 2025-03-08 | End: 2025-03-09

## 2025-03-08 RX ORDER — INSULIN GLARGINE 100 [IU]/ML
10 INJECTION, SOLUTION SUBCUTANEOUS NIGHTLY
Status: DISCONTINUED | OUTPATIENT
Start: 2025-03-08 | End: 2025-03-09

## 2025-03-08 RX ADMIN — MORPHINE SULFATE 1 MG: 2 INJECTION, SOLUTION INTRAMUSCULAR; INTRAVENOUS at 03:14

## 2025-03-08 RX ADMIN — INSULIN GLARGINE 10 UNITS: 100 INJECTION, SOLUTION SUBCUTANEOUS at 20:08

## 2025-03-08 RX ADMIN — LINEZOLID 600 MG: 600 INJECTION, SOLUTION INTRAVENOUS at 12:09

## 2025-03-08 RX ADMIN — INSULIN LISPRO 4 UNITS: 100 INJECTION, SOLUTION INTRAVENOUS; SUBCUTANEOUS at 12:01

## 2025-03-08 RX ADMIN — SODIUM CHLORIDE: 0.9 INJECTION, SOLUTION INTRAVENOUS at 20:01

## 2025-03-08 RX ADMIN — LEVOFLOXACIN 750 MG: 5 INJECTION, SOLUTION INTRAVENOUS at 20:02

## 2025-03-08 RX ADMIN — INSULIN LISPRO 4 UNITS: 100 INJECTION, SOLUTION INTRAVENOUS; SUBCUTANEOUS at 20:09

## 2025-03-08 RX ADMIN — HYDROMORPHONE HYDROCHLORIDE 0.5 MG: 1 INJECTION, SOLUTION INTRAMUSCULAR; INTRAVENOUS; SUBCUTANEOUS at 00:34

## 2025-03-08 RX ADMIN — ENOXAPARIN SODIUM 30 MG: 100 INJECTION SUBCUTANEOUS at 07:58

## 2025-03-08 RX ADMIN — INSULIN LISPRO 6 UNITS: 100 INJECTION, SOLUTION INTRAVENOUS; SUBCUTANEOUS at 18:17

## 2025-03-08 RX ADMIN — OXYCODONE HYDROCHLORIDE AND ACETAMINOPHEN 1 TABLET: 5; 325 TABLET ORAL at 12:06

## 2025-03-08 RX ADMIN — INSULIN LISPRO 2 UNITS: 100 INJECTION, SOLUTION INTRAVENOUS; SUBCUTANEOUS at 08:04

## 2025-03-08 RX ADMIN — ENOXAPARIN SODIUM 30 MG: 100 INJECTION SUBCUTANEOUS at 20:03

## 2025-03-08 RX ADMIN — KETOROLAC TROMETHAMINE 15 MG: 15 INJECTION, SOLUTION INTRAMUSCULAR; INTRAVENOUS at 07:57

## 2025-03-08 RX ADMIN — OXYCODONE HYDROCHLORIDE AND ACETAMINOPHEN 1 TABLET: 5; 325 TABLET ORAL at 18:40

## 2025-03-08 RX ADMIN — SODIUM CHLORIDE, PRESERVATIVE FREE 10 ML: 5 INJECTION INTRAVENOUS at 08:38

## 2025-03-08 RX ADMIN — SODIUM CHLORIDE, PRESERVATIVE FREE 10 ML: 5 INJECTION INTRAVENOUS at 19:57

## 2025-03-08 ASSESSMENT — PAIN SCALES - GENERAL
PAINLEVEL_OUTOF10: 0
PAINLEVEL_OUTOF10: 10
PAINLEVEL_OUTOF10: 9
PAINLEVEL_OUTOF10: 10
PAINLEVEL_OUTOF10: 5
PAINLEVEL_OUTOF10: 8
PAINLEVEL_OUTOF10: 10

## 2025-03-08 ASSESSMENT — PAIN - FUNCTIONAL ASSESSMENT
PAIN_FUNCTIONAL_ASSESSMENT: PREVENTS OR INTERFERES SOME ACTIVE ACTIVITIES AND ADLS
PAIN_FUNCTIONAL_ASSESSMENT: PREVENTS OR INTERFERES SOME ACTIVE ACTIVITIES AND ADLS

## 2025-03-08 ASSESSMENT — PAIN DESCRIPTION - LOCATION
LOCATION: ABDOMEN;FLANK
LOCATION: ABDOMEN;FLANK
LOCATION: ABDOMEN
LOCATION: ABDOMEN;FLANK
LOCATION: ABDOMEN

## 2025-03-08 ASSESSMENT — PAIN DESCRIPTION - ORIENTATION
ORIENTATION: LEFT;UPPER
ORIENTATION: LEFT
ORIENTATION: LEFT;UPPER

## 2025-03-08 ASSESSMENT — PAIN DESCRIPTION - DESCRIPTORS
DESCRIPTORS: SHARP
DESCRIPTORS: SHARP;DISCOMFORT
DESCRIPTORS: DISCOMFORT;SHARP
DESCRIPTORS: SHARP
DESCRIPTORS: SHARP;DISCOMFORT

## 2025-03-08 NOTE — PROGRESS NOTES
Medication Reconciliation    List of medications patient is currently taking is complete.     Source of information: 1. Conversation with patient at bedside                                      2. EPIC records           Notes regarding home medications:     Patient reports she has not taken the metformin since Wednesday and she is also due for Skyrizi injection.        Jasmin Robledo, Pharmacy Intern   3/7/2025  7:03 PM

## 2025-03-08 NOTE — PROGRESS NOTES
Hospitalist Progress Note  3/8/2025 11:19 AM  Subjective:   Admit Date: 3/7/2025  PCP: No primary care provider on file. Status: Inpatient   Interval History: Hospital Day: 2, admitted with left pleuritic pain, found to have multiple pulmonary nodules bilaterally with likely cavitation of  the largest left-sided lesion, suspicious for septic emboli, initiated on linezolid and levofloxacin.  Pulmonary and infectious disease evaluation pending.     Diet: regular  Left forearm peripheral IV (3/8, day #1)    Medications:     linezolid  600 mg IntraVENous Q12H   levofloxacin  750 mg IntraVENous Q24H   insulin lispro SSI  0-8 Units SubCUTAneous 4x Daily AC & HS   enoxaparin  30 mg SubCUTAneous BID       Labs:       03/07/25  1124 03/08/25  0546   WBC 8.5 8.6   HGB 14.6 13.1    205   MCV 97.4 95.4        * 129*   K 4.3 4.2   CL 97* 95*   CO2 24 25   BUN 13 10   CREATININE 0.6 0.5*   GLUCOSE 257* 250*        CALCIUM 10.0 9.3   ALBUMIN 3.9  --    AST 41*  --    ALT 38  --    ALKPHOS 137*  --      hsTnT (3/5) 9 / 8 ng/L  Procalcitonin (3/7) 0.06 ng/mL  CRP (3/7) 69.1 mg/L  ESR (3/8) 60 mm/hr  MRSA nasal DNA screen (3/8) pending  Serum hCG (3/7) negative    Urinalysis (3/5) gluc > 1000 / nitrite neg / leuk est net / 0-2 WBC    Influenza A/B (3/5) not detected  SARS-CoV-2 NAAT (3/5) not detected     POC Glucose:   Recent Labs     03/07/25 2035 03/08/25  0714   POCGLU 223* 240*     CTA chest (3/7) Mildly limited evaluation of the pulmonary arterial system.  No central pulmonary embolism identified. Multiple pulmonary nodules bilaterally with likely cavitation of  the largest left-sided lesion, suspicious for septic emboli.  Trace left pleural fluid.  Imaging follow-up recommended to document resolution. Suggestion of cardiac left ventricular hypertrophy and mild pulmonary arterial hypertension.  Follow-up echocardiogram may be useful as clinically indicated.  Hepatosplenomegaly.    CT abd / pelvis w/ IV contrast  (3/7) No acute abdominal or pelvic abnormality. Mild hepatic steatosis. 6.0 cm subserosal left lower uterine segment fibroid.  Trace left pleural effusion with nodular opacities in the left lower lobe the largest measuring up to 16 mm. Findings may be infectious or inflammatory in etiology.  Septic emboli could be a possibility in the appropriate clinical setting.  Recommend dedicated CT of the chest for further evaluation.    Transthoracic echo (3/8) pending    Objective:   Vitals:  /76   Pulse 79   Temp 98.2 °F (36.8 °C) (Oral)   Resp 16   Ht 1.524 m (5')   Wt 111.1 kg (245 lb)   SpO2 94% on RA  BMI 47.85 kg/m²   General appearance: alert and cooperative with exam  Lungs: diffuse rhonchi with reasonable overall air movement, no wheezes  Heart: regular rate and rhythm, S1, S2 normal, no murmur, click, rub or gallop  Abdomen: soft, non-tender; bowel sounds normal; no masses,  no organomegaly  Extremities: extremities normal, atraumatic, no cyanosis or edema  Neurologic: No obvious focal neurologic deficits.    Assessment and Plan:   Multiple pulmonary nodules bilaterally with likely cavitation of  the largest left-sided lesion, suspicious for septic emboli.  Empiric antibiotic coverage with linezolid and levofloxacin pending infectious disease evaluation.  Testing pending per pulmonary.   Type 2 Diabetes (uncontrolled, HgbA1c 10.4%).  Cover with a \"sliding scale\" lispro moderate scale prandial correction insulin.  Hyponatremia:   Unclear etiology.  Volume expansion with IV crystalloid.   Incidental finding of 6.0 cm subserosal left lower uterine segment fibroid on CT abdomen / pelvis.  Outpatient gynecology follow up.   Class III obesity (BMI 47.9).     Advance Directive: Full Code  DVT prophylaxis with enoxaparin 30 mg sub-Q BID  Discharge planning: > 48 hours      DANAE BRYANT MD  [facility] Hospitalist

## 2025-03-08 NOTE — PROGRESS NOTES
4 Eyes Skin Assessment     NAME:  Stacie Donato  YOB: 1980  MEDICAL RECORD NUMBER:  [id number]    The patient is being assessed for  Admission    I agree that at least one RN has performed a thorough Head to Toe Skin Assessment on the patient. ALL assessment sites listed below have been assessed.      Areas assessed by both nurses:    Head, Face, Ears, Shoulders, Back, Chest, Arms, Elbows, Hands, Sacrum. Buttock, Coccyx, Ischium, Legs. Feet and Heels, and Under Medical Devices         Does the Patient have a Wound? No noted wound(s)       David Prevention initiated by RN: No  Wound Care Orders initiated by RN: No    Pressure Injury (Stage 3,4, Unstageable, DTI, NWPT, and Complex wounds) if present, place Wound referral order by RN under : No    New Ostomies, if present place, Ostomy referral order under : No     Nurse 1 eSignature: Electronically signed by Katherine Kim RN on 3/7/25 at 10:29 PM EST    **SHARE this note so that the co-signing nurse can place an eSignature**    Nurse 2 eSignature: {Esignature:[id number]}

## 2025-03-08 NOTE — PROGRESS NOTES
Received admission from ER per stretcher. Patient is conscious and oriented. Breathing on room air. VVS, not in acute distress. Cmplaining of pain on left upper abd, pain score of 10/10. IV line on RAC, intact no swelling noted. Routine admission care done. Patient oriented to the unit. Call light provided within reach. Safety precaution provided. All needs attended.    Electronically signed by Katherine Kim RN on 3/8/2025 at 2:38 AM

## 2025-03-08 NOTE — ED NOTES
ED TO INPATIENT SBAR HANDOFF    Patient Name: Stacie Donato   Preferred Name: Stacie  : 1980  44 y.o.   Family/Caregiver Present: no   Code Status Order: No Order  PO Status: NPO:No  Telemetry Order:   C-SSRS: Risk of Suicide: No Risk  Sitter no  no  Restraints:     Sepsis Risk Score      Situation  Chief Complaint   Patient presents with    Abdominal Pain    Flank Pain     Pt c/o L flank abd pain since Wednesday; sts seen then also in ED here; reports pain is \"sharp\" and feels nauseated, alert and oriented, skin pink warm and dry, resp even and unlabored.     Brief Description of Patient's Condition: patient with abdominal  pain/flank pain, ct showed possible septic emboli. Admitted for further evaluation.  Mental Status: oriented and alert  Arrived from:Home  Imaging:   CT CHEST PULMONARY EMBOLISM W CONTRAST   Final Result   1.  Mildly limited evaluation of the pulmonary arterial system.  No central   pulmonary embolism identified.      2.  Multiple pulmonary nodules bilaterally with likely cavitation of the   largest left-sided lesion, suspicious for septic emboli.  Trace left pleural   fluid.  Imaging follow-up recommended to document resolution.      3.  Suggestion of cardiac left ventricular hypertrophy and mild pulmonary   arterial hypertension.  Follow-up echocardiogram may be useful as clinically   indicated.      4.  Hepatosplenomegaly.      5.  Additional findings, as above.         CT ABDOMEN PELVIS W IV CONTRAST Additional Contrast? None   Preliminary Result   1. No acute abdominal or pelvic abnormality.   2. Mild hepatic steatosis.   3. 6.0 cm subserosal left lower uterine segment fibroid.   4. Trace left pleural effusion with nodular opacities in the left lower lobe   the largest measuring up to 16 mm. Findings may be infectious or inflammatory   in etiology.  Septic emboli could be a possibility in the appropriate   clinical setting.  Recommend dedicated CT of the chest for further  injection 75 mL (75 mLs IntraVENous Given 3/7/25 1424)   morphine (PF) injection 2 mg (2 mg IntraVENous Given 3/7/25 1658)     Last documented pain medication administration: see MAR  Pertinent or High Risk Medications/Drips: no   If Yes, please provide details: no  Blood Product Administration: no  If Yes, please provide details: no  Process Protocols/Bundles: N/A    Recommendation  Incomplete STAT orders: n/a  Overdue Medications: n/a  Patient Belongings:    Additional Comments:   If any further questions, please call Sending RN at [phone]      Admitting Unit Notification  Name of person notified and time: acc      Electronically signed by: Electronically signed by Charis Moreno RN on 3/7/2025 at 7:04 PM

## 2025-03-08 NOTE — CONSULTS
Pulmonary Consult Note     Patient's name:  Stacie Donato  Medical Record Number: [id number]  Patient's account/billing number: [id number]  Patient's YOB: 1980  Age: 44 y.o.  Date of Admission: 3/7/2025 10:17 AM  Date of Consult: 3/8/2025      Primary Care Physician: No primary care provider on file.      Code Status: Full Code    Reason for consult: bilateral lung nodules     Assessment and Plan     Bilateral lung nodules some with cavitation septic emboli vs metastatic disease vs others   Hyponatremia      Plan:  Bilateral lung nodules and trace left pleural effusion is consistent with septic emboli differential diagnosis include metastatic disease as well as vasculitis  Blood cultures  ECHO   Check CRP, SHOAIB, ANCA  Check MRSA  Check Legionella and strep antigens  Antibiotics per ID  Check A1c  Check drug screen, pt denied any drug abuse history  Check abdomen and pelvis CT to r/o primary         HISTORY OF PRESENT ILLNESS:   /Ms. Stacie Donato is a 44 y.o. lady with past medical history stated below significant for history of asthma, obesity, presented to the emergency department with left side pleuritic chest pain,  We were consulted for lung nodules and small effusion        Past Medical History:        Diagnosis Date    Asthma     Fatty liver     Obesity     Psoriasis     Psoriasis        Past Surgical History:        Procedure Laterality Date    CHOLECYSTECTOMY         Allergies:    No Known Allergies      Home Meds:   Prior to Admission medications    Medication Sig Start Date End Date Taking? Authorizing Provider   metFORMIN (GLUCOPHAGE) 500 MG tablet Take 1 tablet by mouth 2 times daily (with meals) 3/5/25   Malcolm Kaufman,    triamcinolone (NASACORT) 55 MCG/ACT nasal inhaler 2 sprays by Each Nostril route daily  Patient not taking: Reported on 3/7/2025 6/20/23   Robert Engle II, MD   albuterol sulfate HFA (VENTOLIN HFA) 108  Imaging follow-up recommended to document resolution.    3.  Suggestion of cardiac left ventricular hypertrophy and mild pulmonary  arterial hypertension.  Follow-up echocardiogram may be useful as clinically  indicated.    4.  Hepatosplenomegaly.    5.  Additional findings, as above.      CXR PA/LAT: Results for orders placed during the hospital encounter of 03/05/25    XR CHEST (2 VW)    Narrative  EXAMINATION:  TWO XRAY VIEWS OF THE CHEST    3/5/2025 11:16 am    COMPARISON:  06/20/2023    HISTORY:  ORDERING SYSTEM PROVIDED HISTORY: CP  TECHNOLOGIST PROVIDED HISTORY:  Reason for exam:->CP    FINDINGS:  The lungs are clear.  The cardiac silhouette is within normal limits.  There  is no pneumothorax or pleural effusion.    Impression  1.  No acute abnormality.      CXR portable: No results found for this or any previous visit.        Husam Sutherland MD, M.D.            3/8/2025, 11:42 AM

## 2025-03-08 NOTE — PLAN OF CARE
Problem: Discharge Planning  Goal: Discharge to home or other facility with appropriate resources  Outcome: Progressing     Problem: Pain  Goal: Verbalizes/displays adequate comfort level or baseline comfort level  Outcome: Progressing     Problem: Respiratory - Adult  Goal: Achieves optimal ventilation and oxygenation  Outcome: Progressing     Problem: Skin/Tissue Integrity - Adult  Goal: Skin integrity remains intact  Outcome: Progressing     Problem: Gastrointestinal - Adult  Goal: Minimal or absence of nausea and vomiting  Outcome: Progressing     Problem: Genitourinary - Adult  Goal: Absence of urinary retention  Outcome: Progressing     Problem: Infection - Adult  Goal: Absence of infection at discharge  Outcome: Progressing     Problem: Metabolic/Fluid and Electrolytes - Adult  Goal: Electrolytes maintained within normal limits  Outcome: Progressing     Problem: Hematologic - Adult  Goal: Maintains hematologic stability  Outcome: Progressing

## 2025-03-09 PROBLEM — R07.9 LEFT-SIDED CHEST PAIN: Status: ACTIVE | Noted: 2025-03-09

## 2025-03-09 PROBLEM — D84.9 IMMUNOCOMPROMISED: Status: ACTIVE | Noted: 2025-03-09

## 2025-03-09 PROBLEM — R91.8 PULMONARY NODULES: Status: ACTIVE | Noted: 2025-03-09

## 2025-03-09 LAB
CRP SERPL-MCNC: 86.3 MG/L (ref 0–5.1)
GLUCOSE BLD-MCNC: 247 MG/DL (ref 70–99)
GLUCOSE BLD-MCNC: 254 MG/DL (ref 70–99)
GLUCOSE BLD-MCNC: 272 MG/DL (ref 70–99)
GLUCOSE BLD-MCNC: 280 MG/DL (ref 70–99)
LEGIONELLA AG UR QL: NORMAL
OSMOLALITY UR: 838 MOSM/KG (ref 390–1070)
PERFORMED ON: ABNORMAL
S PNEUM AG UR QL: NORMAL
SODIUM UR-SCNC: 67 MMOL/L

## 2025-03-09 PROCEDURE — 6370000000 HC RX 637 (ALT 250 FOR IP): Performed by: STUDENT IN AN ORGANIZED HEALTH CARE EDUCATION/TRAINING PROGRAM

## 2025-03-09 PROCEDURE — 6370000000 HC RX 637 (ALT 250 FOR IP): Performed by: HOSPITALIST

## 2025-03-09 PROCEDURE — 1200000000 HC SEMI PRIVATE

## 2025-03-09 PROCEDURE — 86635 COCCIDIOIDES ANTIBODY: CPT

## 2025-03-09 PROCEDURE — 87449 NOS EACH ORGANISM AG IA: CPT

## 2025-03-09 PROCEDURE — 86140 C-REACTIVE PROTEIN: CPT

## 2025-03-09 PROCEDURE — 6360000002 HC RX W HCPCS: Performed by: INTERNAL MEDICINE

## 2025-03-09 PROCEDURE — 2500000003 HC RX 250 WO HCPCS: Performed by: HOSPITALIST

## 2025-03-09 PROCEDURE — 86698 HISTOPLASMA ANTIBODY: CPT

## 2025-03-09 PROCEDURE — 86480 TB TEST CELL IMMUN MEASURE: CPT

## 2025-03-09 PROCEDURE — 94760 N-INVAS EAR/PLS OXIMETRY 1: CPT

## 2025-03-09 PROCEDURE — 83516 IMMUNOASSAY NONANTIBODY: CPT

## 2025-03-09 PROCEDURE — 99223 1ST HOSP IP/OBS HIGH 75: CPT | Performed by: INTERNAL MEDICINE

## 2025-03-09 PROCEDURE — 86612 BLASTOMYCES ANTIBODY: CPT

## 2025-03-09 PROCEDURE — 6370000000 HC RX 637 (ALT 250 FOR IP): Performed by: INTERNAL MEDICINE

## 2025-03-09 PROCEDURE — 36415 COLL VENOUS BLD VENIPUNCTURE: CPT

## 2025-03-09 PROCEDURE — 6360000002 HC RX W HCPCS: Performed by: HOSPITALIST

## 2025-03-09 PROCEDURE — 84300 ASSAY OF URINE SODIUM: CPT

## 2025-03-09 PROCEDURE — 86038 ANTINUCLEAR ANTIBODIES: CPT

## 2025-03-09 PROCEDURE — 86606 ASPERGILLUS ANTIBODY: CPT

## 2025-03-09 PROCEDURE — 99233 SBSQ HOSP IP/OBS HIGH 50: CPT | Performed by: INTERNAL MEDICINE

## 2025-03-09 PROCEDURE — 83935 ASSAY OF URINE OSMOLALITY: CPT

## 2025-03-09 PROCEDURE — 87385 HISTOPLASMA CAPSUL AG IA: CPT

## 2025-03-09 PROCEDURE — 87327 CRYPTOCOCCUS NEOFORM AG IA: CPT

## 2025-03-09 RX ORDER — INSULIN GLARGINE 100 [IU]/ML
13 INJECTION, SOLUTION SUBCUTANEOUS NIGHTLY
Status: DISCONTINUED | OUTPATIENT
Start: 2025-03-09 | End: 2025-03-09

## 2025-03-09 RX ORDER — GLUCAGON 1 MG/ML
1 KIT INJECTION PRN
Status: DISCONTINUED | OUTPATIENT
Start: 2025-03-09 | End: 2025-03-18 | Stop reason: HOSPADM

## 2025-03-09 RX ORDER — INSULIN LISPRO 100 [IU]/ML
5 INJECTION, SOLUTION INTRAVENOUS; SUBCUTANEOUS
Status: DISCONTINUED | OUTPATIENT
Start: 2025-03-09 | End: 2025-03-14

## 2025-03-09 RX ORDER — DEXTROSE MONOHYDRATE 100 MG/ML
INJECTION, SOLUTION INTRAVENOUS CONTINUOUS PRN
Status: DISCONTINUED | OUTPATIENT
Start: 2025-03-09 | End: 2025-03-18 | Stop reason: HOSPADM

## 2025-03-09 RX ORDER — SENNA AND DOCUSATE SODIUM 50; 8.6 MG/1; MG/1
2 TABLET, FILM COATED ORAL DAILY
Status: DISCONTINUED | OUTPATIENT
Start: 2025-03-09 | End: 2025-03-18 | Stop reason: HOSPADM

## 2025-03-09 RX ORDER — MUPIROCIN 20 MG/G
OINTMENT TOPICAL 2 TIMES DAILY
Status: COMPLETED | OUTPATIENT
Start: 2025-03-09 | End: 2025-03-14

## 2025-03-09 RX ORDER — INSULIN GLARGINE 100 [IU]/ML
15 INJECTION, SOLUTION SUBCUTANEOUS NIGHTLY
Status: DISCONTINUED | OUTPATIENT
Start: 2025-03-09 | End: 2025-03-10

## 2025-03-09 RX ORDER — INSULIN LISPRO 100 [IU]/ML
3 INJECTION, SOLUTION INTRAVENOUS; SUBCUTANEOUS
Status: DISCONTINUED | OUTPATIENT
Start: 2025-03-09 | End: 2025-03-09

## 2025-03-09 RX ADMIN — SODIUM CHLORIDE, PRESERVATIVE FREE 10 ML: 5 INJECTION INTRAVENOUS at 09:50

## 2025-03-09 RX ADMIN — ENOXAPARIN SODIUM 30 MG: 100 INJECTION SUBCUTANEOUS at 20:57

## 2025-03-09 RX ADMIN — POLYETHYLENE GLYCOL 3350 17 G: 17 POWDER, FOR SOLUTION ORAL at 06:29

## 2025-03-09 RX ADMIN — INSULIN LISPRO 4 UNITS: 100 INJECTION, SOLUTION INTRAVENOUS; SUBCUTANEOUS at 13:27

## 2025-03-09 RX ADMIN — INSULIN LISPRO 2 UNITS: 100 INJECTION, SOLUTION INTRAVENOUS; SUBCUTANEOUS at 09:49

## 2025-03-09 RX ADMIN — SENNOSIDES AND DOCUSATE SODIUM 2 TABLET: 50; 8.6 TABLET ORAL at 13:27

## 2025-03-09 RX ADMIN — INSULIN LISPRO 4 UNITS: 100 INJECTION, SOLUTION INTRAVENOUS; SUBCUTANEOUS at 18:46

## 2025-03-09 RX ADMIN — OXYCODONE HYDROCHLORIDE AND ACETAMINOPHEN 1 TABLET: 5; 325 TABLET ORAL at 18:46

## 2025-03-09 RX ADMIN — INSULIN LISPRO 5 UNITS: 100 INJECTION, SOLUTION INTRAVENOUS; SUBCUTANEOUS at 18:46

## 2025-03-09 RX ADMIN — INSULIN GLARGINE 15 UNITS: 100 INJECTION, SOLUTION SUBCUTANEOUS at 20:57

## 2025-03-09 RX ADMIN — MORPHINE SULFATE 1 MG: 2 INJECTION, SOLUTION INTRAMUSCULAR; INTRAVENOUS at 00:30

## 2025-03-09 RX ADMIN — GUAIFENESIN SYRUP AND DEXTROMETHORPHAN 5 ML: 100; 10 SYRUP ORAL at 10:06

## 2025-03-09 RX ADMIN — INSULIN LISPRO 4 UNITS: 100 INJECTION, SOLUTION INTRAVENOUS; SUBCUTANEOUS at 20:56

## 2025-03-09 RX ADMIN — INSULIN LISPRO 5 UNITS: 100 INJECTION, SOLUTION INTRAVENOUS; SUBCUTANEOUS at 13:27

## 2025-03-09 RX ADMIN — ENOXAPARIN SODIUM 30 MG: 100 INJECTION SUBCUTANEOUS at 09:47

## 2025-03-09 RX ADMIN — MUPIROCIN: 20 OINTMENT TOPICAL at 21:08

## 2025-03-09 RX ADMIN — OXYCODONE HYDROCHLORIDE AND ACETAMINOPHEN 1 TABLET: 5; 325 TABLET ORAL at 09:47

## 2025-03-09 RX ADMIN — LINEZOLID 600 MG: 600 INJECTION, SOLUTION INTRAVENOUS at 00:25

## 2025-03-09 ASSESSMENT — PAIN DESCRIPTION - ORIENTATION
ORIENTATION: LEFT

## 2025-03-09 ASSESSMENT — PAIN SCALES - GENERAL
PAINLEVEL_OUTOF10: 10
PAINLEVEL_OUTOF10: 9
PAINLEVEL_OUTOF10: 3
PAINLEVEL_OUTOF10: 6

## 2025-03-09 ASSESSMENT — PAIN - FUNCTIONAL ASSESSMENT: PAIN_FUNCTIONAL_ASSESSMENT: ACTIVITIES ARE NOT PREVENTED

## 2025-03-09 ASSESSMENT — PAIN DESCRIPTION - DESCRIPTORS
DESCRIPTORS: DISCOMFORT;SHARP
DESCRIPTORS: ACHING
DESCRIPTORS: ACHING

## 2025-03-09 ASSESSMENT — PAIN DESCRIPTION - LOCATION
LOCATION: ABDOMEN
LOCATION: ABDOMEN;FLANK
LOCATION: CHEST;RIB CAGE

## 2025-03-09 NOTE — CONSULTS
exposures.      Family History:   History reviewed. No pertinent family history.  There is no family history of autoimmune diseases or significant infectious diseases.      Current Medications:    Current Facility-Administered Medications: glucose chewable tablet 16 g, 4 tablet, Oral, PRN  dextrose bolus 10% 125 mL, 125 mL, IntraVENous, PRN **OR** dextrose bolus 10% 250 mL, 250 mL, IntraVENous, PRN  glucagon injection 1 mg, 1 mg, SubCUTAneous, PRN  dextrose 10 % infusion, , IntraVENous, Continuous PRN  insulin glargine (LANTUS) injection vial 15 Units, 15 Units, SubCUTAneous, Nightly  insulin lispro (HUMALOG,ADMELOG) injection vial 5 Units, 5 Units, SubCUTAneous, TID WC  sennosides-docusate sodium (SENOKOT-S) 8.6-50 MG tablet 2 tablet, 2 tablet, Oral, Daily  linezolid (ZYVOX) IVPB 600 mg, 600 mg, IntraVENous, Q12H  albuterol (PROVENTIL) (2.5 MG/3ML) 0.083% nebulizer solution 2.5 mg, 2.5 mg, Nebulization, Q4H PRN  ipratropium (ATROVENT) 0.02 % nebulizer solution 0.5 mg, 0.5 mg, Nebulization, Q4H PRN  levoFLOXacin (LEVAQUIN) 750 MG/150ML infusion 750 mg, 750 mg, IntraVENous, Q24H  morphine (PF) injection 1 mg, 1 mg, IntraVENous, Q4H PRN  guaiFENesin-dextromethorphan (ROBITUSSIN DM) 100-10 MG/5ML syrup 5 mL, 5 mL, Oral, Q4H PRN  insulin lispro (HUMALOG,ADMELOG) injection vial 0-8 Units, 0-8 Units, SubCUTAneous, 4x Daily AC & HS  sodium chloride flush 0.9 % injection 5-40 mL, 5-40 mL, IntraVENous, 2 times per day  sodium chloride flush 0.9 % injection 5-40 mL, 5-40 mL, IntraVENous, PRN  0.9 % sodium chloride infusion, , IntraVENous, PRN  potassium chloride (KLOR-CON M) extended release tablet 40 mEq, 40 mEq, Oral, PRN **OR** potassium bicarb-citric acid (EFFER-K) effervescent tablet 40 mEq, 40 mEq, Oral, PRN **OR** potassium chloride 10 mEq/100 mL IVPB (Peripheral Line), 10 mEq, IntraVENous, PRN  magnesium sulfate 2000 mg in 50 mL IVPB premix, 2,000 mg, IntraVENous, PRN  enoxaparin Sodium (LOVENOX) injection 30 mg, 30  been  established and values should be interpreted within clinical  context and with serial measurements, if clinically  appropriate.     2025 69.1 (H) 0.0 - 5.1 mg/L Final     Comment:     WR-CRP Reference range:  30D-199Y    <5.1  <30D        Not established    CRP is used in the detection and evaluation of infection,  tissue injury, inflammatory disorders, and associated  disease.  Increases in CRP values are non-specific and  should not be interpreted without a complete clinical  evaluation.   reference ranges have not been  established and values should be interpreted within clinical  context and with serial measurements, if clinically  appropriate.           Cultures:  3/5 COVID NAAT and flu ag neg    UAneg   3/7 BC x2 NGTD  3/8 MRSA screen +    Legionella and pneumococcal ag neg       Radiology Review:  All pertinent images / reports were reviewed as a part of this visit.     CT chest 3/7/25  IMPRESSION:  1.  Mildly limited evaluation of the pulmonary arterial system.  No central pulmonary embolism identified.   2.  Multiple pulmonary nodules bilaterally with likely cavitation of the  largest left-sided lesion, suspicious for septic emboli.  Trace left pleural  fluid.  Imaging follow-up recommended to document resolution.   3.  Suggestion of cardiac left ventricular hypertrophy and mild pulmonary arterial hypertension.  Follow-up echocardiogram may be useful as clinically indicated.   4.  Hepatosplenomegaly.   5.  Additional findings, as above.    CT ap 3/7/25  IMPRESSION:  1. No acute abdominal or pelvic abnormality.  2. Mild hepatic steatosis.  3. 6.0 cm subserosal left lower uterine segment fibroid.  4. Trace left pleural effusion with nodular opacities in the left lower lobe  the largest measuring up to 16 mm. Findings may be infectious or inflammatory in etiology.  Septic emboli could be a possibility in the appropriate clinical setting.  Recommend dedicated CT of the chest for further    Illness(es)/ Infection present that pose threat to bodily function.   There is potential for severe exacerbation of infection/side effects of treatment.  Therapy requires intensive monitoring for antimicrobial agent toxicity         Patricia Kohler M.D.    Thank you for the opportunity to participate in the care of your patient.    Please do not hesitate to contact me:   [phone] office

## 2025-03-09 NOTE — PROGRESS NOTES
Pulmonary Progress Note     Patient's name:  Stacie Donato  Medical Record Number: [id number]  Patient's account/billing number: [id number]  Patient's YOB: 1980  Age: 44 y.o.  Date of Admission: 3/7/2025 10:17 AM  Date of Consult: 3/9/2025      Primary Care Physician: No primary care provider on file.      Code Status: Full Code    Reason for consult: bilateral lung nodules     Assessment and Plan     Bilateral lung nodules some with cavitation septic emboli vs metastatic disease vs others   Hyponatremia      Plan:  Bilateral lung nodules and trace left pleural effusion is consistent with septic emboli differential diagnosis include metastatic disease as well as vasculitis  Blood cultures  ECHO results reviewed  Follow up SHOAIB, ANCA  All cultures negative to date   Antibiotics per ID          HISTORY OF PRESENT ILLNESS:   /Ms. Stacie Donato is a 44 y.o. lady with past medical history stated below significant for history of asthma, obesity, presented to the emergency department with left side pleuritic chest pain,  We were consulted for lung nodules and small effusion        Physical Exam:    Vitals: /73   Pulse 78   Temp 98.1 °F (36.7 °C) (Oral)   Resp 17   Ht 1.524 m (5')   Wt 112 kg (246 lb 14.6 oz)   SpO2 97%   BMI 48.22 kg/m²     Last Body weight:   Wt Readings from Last 3 Encounters:   03/09/25 112 kg (246 lb 14.6 oz)   03/05/25 114 kg (251 lb 5.2 oz)   07/04/24 119.7 kg (263 lb 14.3 oz)       Body Mass Index : Body mass index is 48.22 kg/m².      Intake and Output summary:   Intake/Output Summary (Last 24 hours) at 3/9/2025 1208  Last data filed at 3/9/2025 0940  Gross per 24 hour   Intake 1360 ml   Output --   Net 1360 ml       Physical Examination:     PHYSICAL EXAM:    Gen: Mild acute distress  Eyes: PERRL. Anicteric sclera. No conjunctival injection.   ENT: No discharge. Posterior oropharynx clear. External appearance of ears

## 2025-03-09 NOTE — PROGRESS NOTES
V2.0  [facility] Hospitalist Progress Note      Name:  Stacie Donato /Age/Sex: 1980  (44 y.o. female)   MRN & CSN:  [id number] & [id number] Encounter Date/Time: 3/9/2025 8:50 AM EDT    Location:  [place] [address]/4117-01 PCP: No primary care provider on file.       Hospital Day: 3    Assessment and Plan:   Stacie Donato is a 44 y.o. female with T2DM presenting with multiple pulmonary nodules with cavitation      Plan:  Multiple pulmonary nodules with cavitation  -empiric antibiotics with linezolid  -pulmonology consulted.  Note 3/8 reviewed: Differential includes metastatic disease, vasculitis, septic emboli.  Antibiotics per ID.  Check echo, CRP, SHOAIB, ANCA, MRSA  -Infectious disease consulted.   -Discussed with pulmonology 3/9.  Linezolid has increased pulmonary penetrance, but vancomycin would still be reasonable choice.  Favoring infectious etiology.  Transthoracic echo negative for vegetation BEN may be indicated. Defer to ID  Hyponatremia  -Check urine studies  T2DM  -A1c 10.4  -Increasing Lantus to 15 units nightly.  5 units     Ppx: Lovenox  Dispo: Home    Subjective:     Chief Complaint: Abdominal Pain and Flank Pain (Pt c/o L flank abd pain since Wednesday; sts seen then also in ED here; reports pain is \"sharp\" and feels nauseated, alert and oriented, skin pink warm and dry, resp even and unlabored.)     Interval history  Denies fever/chills.  Reports cough only really started after arriving to the hospital and has been cleared mucous.  Denies current shortness of breath.  Denies pain    Review of Systems:    Review of Systems    10 point ROS negative except as stated above in \"subjective\" section    Objective:     Intake/Output Summary (Last 24 hours) at 3/9/2025 0850  Last data filed at 3/8/2025 2002  Gross per 24 hour   Intake 1120 ml   Output --   Net 1120 ml        Vitals:   Vitals:    25 0735   BP: 122/73   Pulse: 78   Resp: 17   Temp: 98.1 °F (36.7 °C)   SpO2: 97%       Physical Exam:

## 2025-03-09 NOTE — PLAN OF CARE
Problem: Discharge Planning  Goal: Discharge to home or other facility with appropriate resources  Outcome: Progressing     Problem: Pain  Goal: Verbalizes/displays adequate comfort level or baseline comfort level  3/9/2025 0914 by Jere Quiñonez RN  Outcome: Progressing  3/8/2025 2026 by Shelley Campo RN  Outcome: Progressing     Problem: Respiratory - Adult  Goal: Achieves optimal ventilation and oxygenation  3/9/2025 0914 by Jere Quiñonez RN  Outcome: Progressing  3/8/2025 2026 by Shelley Campo RN  Outcome: Progressing     Problem: Skin/Tissue Integrity - Adult  Goal: Skin integrity remains intact  3/9/2025 0914 by Jere Quiñonez RN  Outcome: Progressing  3/8/2025 2026 by Shelley Campo RN  Outcome: Progressing     Problem: Gastrointestinal - Adult  Goal: Minimal or absence of nausea and vomiting  3/9/2025 0914 by Jere Quiñonez RN  Outcome: Progressing  3/8/2025 2026 by Shelley Campo RN  Outcome: Progressing     Problem: Genitourinary - Adult  Goal: Absence of urinary retention  3/9/2025 0914 by Jere Quiñonez RN  Outcome: Progressing  3/8/2025 2026 by Shelley Campo RN  Outcome: Progressing     Problem: Infection - Adult  Goal: Absence of infection at discharge  3/9/2025 0914 by Jere Quiñonez RN  Outcome: Progressing  3/8/2025 2026 by Shelley Campo RN  Outcome: Progressing     Problem: Metabolic/Fluid and Electrolytes - Adult  Goal: Electrolytes maintained within normal limits  3/9/2025 0914 by Jere Quiñonez RN  Outcome: Progressing  3/8/2025 2026 by Shelley Campo RN  Outcome: Progressing     Problem: Hematologic - Adult  Goal: Maintains hematologic stability  Outcome: Progressing     Problem: Cardiovascular - Adult  Goal: Maintains optimal cardiac output and hemodynamic stability  Outcome: Progressing

## 2025-03-09 NOTE — PLAN OF CARE
Problem: Discharge Planning  Goal: Discharge to home or other facility with appropriate resources  3/8/2025 1416 by Jere Quiñonez RN  Outcome: Progressing     Problem: Pain  Goal: Verbalizes/displays adequate comfort level or baseline comfort level  3/8/2025 2026 by Shelley Campo RN  Outcome: Progressing  3/8/2025 1416 by Jere Quiñonez RN  Outcome: Progressing     Problem: Respiratory - Adult  Goal: Achieves optimal ventilation and oxygenation  3/8/2025 2026 by Shelley Campo RN  Outcome: Progressing  3/8/2025 1416 by Jere Quiñonez RN  Outcome: Progressing     Problem: Skin/Tissue Integrity - Adult  Goal: Skin integrity remains intact  3/8/2025 2026 by Shelley Campo RN  Outcome: Progressing  3/8/2025 1416 by Jere Quiñonez RN  Outcome: Progressing     Problem: Gastrointestinal - Adult  Goal: Minimal or absence of nausea and vomiting  3/8/2025 2026 by Shelley Campo RN  Outcome: Progressing  3/8/2025 1416 by Jere Quiñonez RN  Outcome: Progressing     Problem: Genitourinary - Adult  Goal: Absence of urinary retention  3/8/2025 2026 by Shelley Campo RN  Outcome: Progressing  3/8/2025 1416 by Jere Quiñonez RN  Outcome: Progressing     Problem: Infection - Adult  Goal: Absence of infection at discharge  3/8/2025 2026 by Shelley Campo RN  Outcome: Progressing  3/8/2025 1416 by Jere Quiñonez RN  Outcome: Progressing     Problem: Metabolic/Fluid and Electrolytes - Adult  Goal: Electrolytes maintained within normal limits  3/8/2025 2026 by Shelley Campo RN  Outcome: Progressing  3/8/2025 1416 by Jere Quiñonez RN  Outcome: Progressing     Problem: Hematologic - Adult  Goal: Maintains hematologic stability  3/8/2025 1416 by Jere Quiñonez RN  Outcome: Progressing

## 2025-03-09 NOTE — PROGRESS NOTES
Pt aox4, ambulated to toilet without difficulty. Reviewed and administered night medications. Pt states she has not had a bowel movement since 3/4. Educated pt about importance of bowel movements.Encouraged pt to ambulate kovacs before trying a laxative tonight.  Electronically signed by JUAN C STRATTON RN on 3/8/2025 at 8:24 PM

## 2025-03-10 LAB
ANA SER QL IA: NEGATIVE
ANION GAP SERPL CALCULATED.3IONS-SCNC: 10 MMOL/L (ref 3–16)
BASOPHILS # BLD: 0 K/UL (ref 0–0.2)
BASOPHILS NFR BLD: 0.4 %
BUN SERPL-MCNC: 9 MG/DL (ref 7–20)
CALCIUM SERPL-MCNC: 8.9 MG/DL (ref 8.3–10.6)
CHLORIDE SERPL-SCNC: 101 MMOL/L (ref 99–110)
CO2 SERPL-SCNC: 26 MMOL/L (ref 21–32)
CREAT SERPL-MCNC: 0.4 MG/DL (ref 0.6–1.1)
DEPRECATED RDW RBC AUTO: 11.9 % (ref 12.4–15.4)
EOSINOPHIL # BLD: 0.2 K/UL (ref 0–0.6)
EOSINOPHIL NFR BLD: 2.6 %
GFR SERPLBLD CREATININE-BSD FMLA CKD-EPI: >90 ML/MIN/{1.73_M2}
GLUCOSE BLD-MCNC: 233 MG/DL (ref 70–99)
GLUCOSE BLD-MCNC: 271 MG/DL (ref 70–99)
GLUCOSE BLD-MCNC: 286 MG/DL (ref 70–99)
GLUCOSE BLD-MCNC: 288 MG/DL (ref 70–99)
GLUCOSE SERPL-MCNC: 215 MG/DL (ref 70–99)
HCT VFR BLD AUTO: 34.9 % (ref 36–48)
HGB BLD-MCNC: 12.3 G/DL (ref 12–16)
LYMPHOCYTES # BLD: 1.4 K/UL (ref 1–5.1)
LYMPHOCYTES NFR BLD: 17.8 %
MCH RBC QN AUTO: 33.4 PG (ref 26–34)
MCHC RBC AUTO-ENTMCNC: 35.1 G/DL (ref 31–36)
MCV RBC AUTO: 95.1 FL (ref 80–100)
MONOCYTES # BLD: 0.6 K/UL (ref 0–1.3)
MONOCYTES NFR BLD: 7.1 %
NEUTROPHILS # BLD: 5.7 K/UL (ref 1.7–7.7)
NEUTROPHILS NFR BLD: 72.1 %
ORGANISM: ABNORMAL
OSMOLALITY SERPL: 307 MOSM/KG (ref 275–295)
PERFORMED ON: ABNORMAL
PLATELET # BLD AUTO: 262 K/UL (ref 135–450)
PMV BLD AUTO: 7.4 FL (ref 5–10.5)
POTASSIUM SERPL-SCNC: 3.7 MMOL/L (ref 3.5–5.1)
RBC # BLD AUTO: 3.67 M/UL (ref 4–5.2)
REPORT: NORMAL
RESP PATH DNA+RNA PNL L RESP NAA+NON-PRB: ABNORMAL
SODIUM SERPL-SCNC: 137 MMOL/L (ref 136–145)
WBC # BLD AUTO: 8 K/UL (ref 4–11)

## 2025-03-10 PROCEDURE — 6360000002 HC RX W HCPCS

## 2025-03-10 PROCEDURE — 99233 SBSQ HOSP IP/OBS HIGH 50: CPT | Performed by: INTERNAL MEDICINE

## 2025-03-10 PROCEDURE — 2580000003 HC RX 258: Performed by: HOSPITALIST

## 2025-03-10 PROCEDURE — 6370000000 HC RX 637 (ALT 250 FOR IP): Performed by: HOSPITALIST

## 2025-03-10 PROCEDURE — 6360000002 HC RX W HCPCS: Performed by: HOSPITALIST

## 2025-03-10 PROCEDURE — 87385 HISTOPLASMA CAPSUL AG IA: CPT

## 2025-03-10 PROCEDURE — 87077 CULTURE AEROBIC IDENTIFY: CPT

## 2025-03-10 PROCEDURE — 87205 SMEAR GRAM STAIN: CPT

## 2025-03-10 PROCEDURE — 87070 CULTURE OTHR SPECIMN AEROBIC: CPT

## 2025-03-10 PROCEDURE — 87633 RESP VIRUS 12-25 TARGETS: CPT

## 2025-03-10 PROCEDURE — 2580000003 HC RX 258: Performed by: INTERNAL MEDICINE

## 2025-03-10 PROCEDURE — 36415 COLL VENOUS BLD VENIPUNCTURE: CPT

## 2025-03-10 PROCEDURE — 6360000002 HC RX W HCPCS: Performed by: STUDENT IN AN ORGANIZED HEALTH CARE EDUCATION/TRAINING PROGRAM

## 2025-03-10 PROCEDURE — 85025 COMPLETE CBC W/AUTO DIFF WBC: CPT

## 2025-03-10 PROCEDURE — 6370000000 HC RX 637 (ALT 250 FOR IP): Performed by: STUDENT IN AN ORGANIZED HEALTH CARE EDUCATION/TRAINING PROGRAM

## 2025-03-10 PROCEDURE — 2500000003 HC RX 250 WO HCPCS: Performed by: HOSPITALIST

## 2025-03-10 PROCEDURE — 94760 N-INVAS EAR/PLS OXIMETRY 1: CPT

## 2025-03-10 PROCEDURE — 6360000002 HC RX W HCPCS: Performed by: INTERNAL MEDICINE

## 2025-03-10 PROCEDURE — 80048 BASIC METABOLIC PNL TOTAL CA: CPT

## 2025-03-10 PROCEDURE — 1200000000 HC SEMI PRIVATE

## 2025-03-10 RX ORDER — MORPHINE SULFATE 2 MG/ML
2 INJECTION, SOLUTION INTRAMUSCULAR; INTRAVENOUS ONCE
Status: COMPLETED | OUTPATIENT
Start: 2025-03-10 | End: 2025-03-10

## 2025-03-10 RX ORDER — METHOCARBAMOL 500 MG/1
1000 TABLET, FILM COATED ORAL 4 TIMES DAILY
Status: DISCONTINUED | OUTPATIENT
Start: 2025-03-10 | End: 2025-03-18 | Stop reason: HOSPADM

## 2025-03-10 RX ORDER — LINEZOLID 2 MG/ML
600 INJECTION, SOLUTION INTRAVENOUS EVERY 12 HOURS
Status: DISCONTINUED | OUTPATIENT
Start: 2025-03-10 | End: 2025-03-14

## 2025-03-10 RX ORDER — BENZONATATE 100 MG/1
100 CAPSULE ORAL 3 TIMES DAILY PRN
Status: DISCONTINUED | OUTPATIENT
Start: 2025-03-10 | End: 2025-03-18 | Stop reason: HOSPADM

## 2025-03-10 RX ORDER — INSULIN GLARGINE 100 [IU]/ML
18 INJECTION, SOLUTION SUBCUTANEOUS NIGHTLY
Status: DISCONTINUED | OUTPATIENT
Start: 2025-03-10 | End: 2025-03-12

## 2025-03-10 RX ORDER — GUAIFENESIN 600 MG/1
600 TABLET, EXTENDED RELEASE ORAL 2 TIMES DAILY
Status: DISCONTINUED | OUTPATIENT
Start: 2025-03-10 | End: 2025-03-18 | Stop reason: HOSPADM

## 2025-03-10 RX ORDER — METHOCARBAMOL 100 MG/ML
1000 INJECTION, SOLUTION INTRAMUSCULAR; INTRAVENOUS ONCE
Status: COMPLETED | OUTPATIENT
Start: 2025-03-10 | End: 2025-03-10

## 2025-03-10 RX ADMIN — METHOCARBAMOL 1000 MG: 500 TABLET ORAL at 12:39

## 2025-03-10 RX ADMIN — SODIUM CHLORIDE: 0.9 INJECTION, SOLUTION INTRAVENOUS at 19:04

## 2025-03-10 RX ADMIN — SODIUM CHLORIDE, PRESERVATIVE FREE 10 ML: 5 INJECTION INTRAVENOUS at 09:25

## 2025-03-10 RX ADMIN — METHOCARBAMOL 1000 MG: 500 TABLET ORAL at 18:06

## 2025-03-10 RX ADMIN — METHOCARBAMOL 1000 MG: 500 TABLET ORAL at 20:50

## 2025-03-10 RX ADMIN — ENOXAPARIN SODIUM 30 MG: 100 INJECTION SUBCUTANEOUS at 09:19

## 2025-03-10 RX ADMIN — ENOXAPARIN SODIUM 30 MG: 100 INJECTION SUBCUTANEOUS at 20:49

## 2025-03-10 RX ADMIN — MORPHINE SULFATE 1 MG: 2 INJECTION, SOLUTION INTRAMUSCULAR; INTRAVENOUS at 15:24

## 2025-03-10 RX ADMIN — MORPHINE SULFATE 1 MG: 2 INJECTION, SOLUTION INTRAMUSCULAR; INTRAVENOUS at 06:31

## 2025-03-10 RX ADMIN — OXYCODONE HYDROCHLORIDE AND ACETAMINOPHEN 1 TABLET: 5; 325 TABLET ORAL at 02:08

## 2025-03-10 RX ADMIN — LINEZOLID 600 MG: 600 INJECTION, SOLUTION INTRAVENOUS at 19:06

## 2025-03-10 RX ADMIN — SODIUM CHLORIDE: 0.9 INJECTION, SOLUTION INTRAVENOUS at 19:05

## 2025-03-10 RX ADMIN — INSULIN LISPRO 4 UNITS: 100 INJECTION, SOLUTION INTRAVENOUS; SUBCUTANEOUS at 20:49

## 2025-03-10 RX ADMIN — GUAIFENESIN 600 MG: 600 TABLET ORAL at 20:50

## 2025-03-10 RX ADMIN — INSULIN LISPRO 4 UNITS: 100 INJECTION, SOLUTION INTRAVENOUS; SUBCUTANEOUS at 12:39

## 2025-03-10 RX ADMIN — PIPERACILLIN AND TAZOBACTAM 4500 MG: 4; .5 INJECTION, POWDER, LYOPHILIZED, FOR SOLUTION INTRAVENOUS at 19:04

## 2025-03-10 RX ADMIN — METHOCARBAMOL 1000 MG: 100 INJECTION INTRAMUSCULAR; INTRAVENOUS at 09:20

## 2025-03-10 RX ADMIN — MUPIROCIN: 20 OINTMENT TOPICAL at 20:49

## 2025-03-10 RX ADMIN — INSULIN GLARGINE 18 UNITS: 100 INJECTION, SOLUTION SUBCUTANEOUS at 20:50

## 2025-03-10 RX ADMIN — INSULIN LISPRO 2 UNITS: 100 INJECTION, SOLUTION INTRAVENOUS; SUBCUTANEOUS at 09:20

## 2025-03-10 RX ADMIN — OXYCODONE HYDROCHLORIDE AND ACETAMINOPHEN 1 TABLET: 5; 325 TABLET ORAL at 18:06

## 2025-03-10 RX ADMIN — INSULIN LISPRO 4 UNITS: 100 INJECTION, SOLUTION INTRAVENOUS; SUBCUTANEOUS at 18:05

## 2025-03-10 RX ADMIN — MORPHINE SULFATE 2 MG: 2 INJECTION, SOLUTION INTRAMUSCULAR; INTRAVENOUS at 09:20

## 2025-03-10 RX ADMIN — HYDROMORPHONE HYDROCHLORIDE 0.25 MG: 1 INJECTION, SOLUTION INTRAMUSCULAR; INTRAVENOUS; SUBCUTANEOUS at 07:52

## 2025-03-10 RX ADMIN — MUPIROCIN: 20 OINTMENT TOPICAL at 09:39

## 2025-03-10 RX ADMIN — OXYCODONE HYDROCHLORIDE AND ACETAMINOPHEN 1 TABLET: 5; 325 TABLET ORAL at 10:53

## 2025-03-10 ASSESSMENT — PAIN DESCRIPTION - ONSET
ONSET: ON-GOING

## 2025-03-10 ASSESSMENT — PAIN DESCRIPTION - LOCATION
LOCATION: FLANK
LOCATION: FLANK
LOCATION: ABDOMEN;RIB CAGE
LOCATION: FLANK
LOCATION: ABDOMEN
LOCATION: FLANK

## 2025-03-10 ASSESSMENT — PAIN - FUNCTIONAL ASSESSMENT
PAIN_FUNCTIONAL_ASSESSMENT: ACTIVITIES ARE NOT PREVENTED

## 2025-03-10 ASSESSMENT — PAIN DESCRIPTION - DESCRIPTORS
DESCRIPTORS: SHARP;THROBBING
DESCRIPTORS: THROBBING;SHARP;SHOOTING
DESCRIPTORS: SHARP;THROBBING
DESCRIPTORS: ACHING;THROBBING
DESCRIPTORS: ACHING
DESCRIPTORS: SHARP;THROBBING
DESCRIPTORS: SHARP;THROBBING

## 2025-03-10 ASSESSMENT — PAIN DESCRIPTION - PAIN TYPE
TYPE: ACUTE PAIN

## 2025-03-10 ASSESSMENT — PAIN SCALES - GENERAL
PAINLEVEL_OUTOF10: 3
PAINLEVEL_OUTOF10: 10
PAINLEVEL_OUTOF10: 8
PAINLEVEL_OUTOF10: 6
PAINLEVEL_OUTOF10: 5
PAINLEVEL_OUTOF10: 10
PAINLEVEL_OUTOF10: 10
PAINLEVEL_OUTOF10: 8
PAINLEVEL_OUTOF10: 10
PAINLEVEL_OUTOF10: 8
PAINLEVEL_OUTOF10: 10
PAINLEVEL_OUTOF10: 8
PAINLEVEL_OUTOF10: 8
PAINLEVEL_OUTOF10: 10
PAINLEVEL_OUTOF10: 10
PAINLEVEL_OUTOF10: 5
PAINLEVEL_OUTOF10: 10
PAINLEVEL_OUTOF10: 10
PAINLEVEL_OUTOF10: 5

## 2025-03-10 ASSESSMENT — PAIN DESCRIPTION - ORIENTATION
ORIENTATION: LEFT

## 2025-03-10 ASSESSMENT — PAIN DESCRIPTION - FREQUENCY
FREQUENCY: CONTINUOUS

## 2025-03-10 NOTE — PROGRESS NOTES
V2.0  [facility] [place]      Name:  Stacie Donato /Age/Sex: 1980  (44 y.o. female)   MRN & CSN:  [id number] & [id number] Encounter Date/Time: 3/10/2025 8:50 AM EDT    Location:  [id number]/4117-01 PCP: No primary care provider on file.       Hospital Day: 4    Assessment and Plan:   Stacie Donato is a 44 y.o. female with T2DM presenting with multiple pulmonary nodules with cavitation      Plan:  Multiple pulmonary nodules with cavitation  -empiric antibiotics with linezolid  -pulmonology consulted.  Note 3/8 reviewed: Differential includes metastatic disease, vasculitis, septic emboli.  Antibiotics per ID.  Check echo, CRP, SHOAIB, ANCA, MRSA  -Patient with increased pain today.  Ordered additional dose of pain medicine plus muscle relaxant  -ID note 3/9 reviewed: Suspect MRSA colonization.  Patient does not appear systemically ill.  Feel clinical picture not as consistent with septic emboli as would expect more systemic toxicity, fevers and leukocytosis.  Noted patient is immunocompromised.  Favor stopping antibacterials and monitoring expectantly.  Increase suspicion of bacterial infection should patient have fever or clinical change after ceasing antibiotics.  Would then recommend resumption of antibiotics and BEN.  Check for endemic fungal infections  -Discussed with pulmonology 3/10.  Patient's pain localizes to the areas of greatest inflammation on her imaging.  If not infectious, suspect likely inflammatory.  Hesitant to initiate glucocorticoids while patient is being trialed off of antibiotics due to the possibility of worsening infection, but will possibly add on steroids if antibiotics are is resumed   Hyponatremia  -Check urine studies  T2DM  -A1c 10.4  -Increasing Lantus to 18 units nightly.  Continue 5 units TID with meals, MDSSI. Monitor toxicity of insulin with glucose checks for hypoglycemia    Ppx: Lovenox  Dispo: Home    Subjective:     Chief Complaint: Abdominal Pain and Flank  262 135 - 450 K/uL    MPV 7.4 5.0 - 10.5 fL    Neutrophils % 72.1 %    Lymphocytes % 17.8 %    Monocytes % 7.1 %    Eosinophils % 2.6 %    Basophils % 0.4 %    Neutrophils Absolute 5.7 1.7 - 7.7 K/uL    Lymphocytes Absolute 1.4 1.0 - 5.1 K/uL    Monocytes Absolute 0.6 0.0 - 1.3 K/uL    Eosinophils Absolute 0.2 0.0 - 0.6 K/uL    Basophils Absolute 0.0 0.0 - 0.2 K/uL        Imaging/Diagnostics Last 24 Hours   Echo (TTE) complete (PRN contrast/bubble/strain/3D)  Result Date: 3/8/2025    Left Ventricle: Normal left ventricular systolic function with a visually estimated EF of 60 - 65%. Left ventricle size is normal. Normal wall thickness. Normal wall motion. Normal diastolic function.   Right Ventricle: Right ventricle size is normal. Normal systolic function.   Aorta: Normal sized aortic root. Ascending aorta may be mildly dilated measuring 3.6 cm.   No significant valvular abnormalities noted.  No obvious vegetations.     CT ABDOMEN PELVIS W IV CONTRAST Additional Contrast? None  Result Date: 3/8/2025  EXAMINATION: CT OF THE ABDOMEN AND PELVIS WITH CONTRAST 3/7/2025 12:32 pm TECHNIQUE: CT of the abdomen and pelvis was performed with the administration of intravenous contrast. Multiplanar reformatted images are provided for review. Automated exposure control, iterative reconstruction, and/or weight based adjustment of the mA/kV was utilized to reduce the radiation dose to as low as reasonably achievable. COMPARISON: 07/02/2019 HISTORY: ORDERING SYSTEM PROVIDED HISTORY: Left flank pain TECHNOLOGIST PROVIDED HISTORY: Additional Contrast?->None Reason for exam:->Left flank pain Decision Support Exception - unselect if not a suspected or confirmed emergency medical condition->Emergency Medical Condition (MA) Reason for Exam: lt flank pain w n Relevant Medical/Surgical History: gino FINDINGS: Lower Chest: Trace left pleural effusion nodular opacities within the left lower lobe with the largest measuring up to 16 mm  4.  Hepatosplenomegaly. 5.  Additional findings, as above.       Electronically signed by Cornel Alford MD on 3/10/2025 at 8:09 AM

## 2025-03-10 NOTE — PROGRESS NOTES
Pt has c/o 10/10 left flank pain, described as throbbing and sharp. Pt states she \"can't catch her breath\". Pt is restless and tearful. Oral temp 98.2, HR 97, respirations 22, /109, and O2 98% on room air. Pt received prn Morphine at 0631, unable to received Percocet until 0808. Notified Dr. Alford. Waiting for response.  Electronically signed by Ariadna Powers RN on 3/10/2025 at 7:37 AM

## 2025-03-10 NOTE — CARE COORDINATION
Case Management Assessment  Initial Evaluation    Date/Time of Evaluation: 3/10/2025 2:06 PM  Assessment Completed by: SHOAIB Bartholomew    If patient is discharged prior to next notation, then this note serves as note for discharge by case management.    Patient Name: Stacie Donato                   YOB: 1980  Diagnosis: Shortness of breath [R06.02]  Pleural effusion on left [J90]  Left-sided chest pain [R07.9]  Acute septic pulmonary embolism, unspecified whether acute cor pulmonale present (HCC) [I26.90]                   Date / Time: 3/7/2025 10:17 AM    Patient Admission Status: Inpatient   Readmission Risk (Low < 19, Mod (19-27), High > 27): Readmission Risk Score: 6.7    Current PCP: No primary care provider on file.  PCP verified by CM? (P) No (Pt has no PCP.  Clinic list provided)    Chart Reviewed: Yes      History Provided by: Patient  Patient Orientation: Alert and Oriented, Person, Place, Situation, Self    Patient Cognition: Alert    Hospitalization in the last 30 days (Readmission):  No    If yes, Readmission Assessment in CM Navigator will be completed.    Advance Directives:      Code Status: Full Code   Patient's Primary Decision Maker is: (P) Legal Next of Kin    Primary Decision Maker: Jasmin Galarza - [phone]    Discharge Planning:    Patient lives with: (P) Spouse/Significant Other Type of Home: (P) Apartment  Primary Care Giver: Self  Patient Support Systems include: Spouse/Significant Other, Family Members   Current Financial resources: (P) None ( provided pt with SVDP resources)  Current community resources: (P) None  Current services prior to admission: (P) None            Current DME:              Type of Home Care services:  (P) None    ADLS  Prior functional level: (P) Independent in ADLs/IADLs  Current functional level: (P) Independent in ADLs/IADLs    PT AM-PAC:   /24  OT AM-PAC:   /24    Family can provide assistance at DC: (P) Yes  Would you like Case  Management to discuss the discharge plan with any other family members/significant others, and if so, who? (P) No  Plans to Return to Present Housing: (P) Yes  Other Identified Issues/Barriers to RETURNING to current housing: None noted  Potential Assistance needed at discharge: (P) Prescription Assistance            Potential DME: No   Patient expects to discharge to: (P) Apartment  Plan for transportation at discharge: (P) Family    Financial    Payor: /     Does insurance require precert for SNF: No    Potential assistance Purchasing Medications: (P) Yes (SVDP resources provided)  Meds-to-Beds request:        [facility] [place], [place] - [address] - P [phone] - F [phone]  [address] [address]  Phone: [phone] Fax: [phone]      Notes:    Factors facilitating achievement of predicted outcomes: Family support, Friend support, and Cooperative    Barriers to discharge: No insurance coverage    Additional Case Management Notes: Pt resides with her boyfriend in an apartment.  Pt reported being independent with all self care PTA.  Pt does not currently have insurance or a PCP.  Pt was provided with information for SVDP and a clinic list.  Pt stated she was not feeling well and asked this SW to f/u with her at a later time.     PLAN: From home with boyfriend.  SVDP and Clinic list provided.  No insurance.  Will have a ride home at d/c.  Will continue to follow for additional needs.     The Plan for Transition of Care is related to the following treatment goals of Shortness of breath [R06.02]  Pleural effusion on left [J90]  Left-sided chest pain [R07.9]  Acute septic pulmonary embolism, unspecified whether acute cor pulmonale present (HCC) [I26.90]      SHOAIB Bartholomew  Case Management Department  Ph: [phone] Fax: [phone]  Electronically signed by SHOAIB Bartholomew on 3/10/2025 at 2:09 PM       03/10/25 1403   Service

## 2025-03-10 NOTE — PROGRESS NOTES
Pulmonary Progress Note     Patient's name:  Stacie Donato  Medical Record Number: [id number]  Patient's account/billing number: [id number]  Patient's YOB: 1980  Age: 44 y.o.  Date of Admission: 3/7/2025 10:17 AM  Date of Consult: 3/10/2025      Primary Care Physician: No primary care provider on file.      Code Status: Full Code    Reason for consult: bilateral lung nodules     Assessment and Plan     Bilateral lung nodules some with cavitation septic emboli vs metastatic disease vs others   Hyponatremia  Recent history of dental issues       Plan:  Bilateral lung nodules and trace left pleural effusion is consistent with septic emboli differential diagnosis include metastatic disease as well as vasculitis  Blood cultures  ECHO results reviewed  Follow up SHOAIB, ANCA  All cultures negative to date   Antibiotics per ID, currently off, monitor fever          HISTORY OF PRESENT ILLNESS:   /Ms. Stacie Donato is a 44 y.o. lady with past medical history stated below significant for history of asthma, obesity, presented to the emergency department with left side pleuritic chest pain,  We were consulted for lung nodules and small effusion        Physical Exam:    Vitals: /83   Pulse (!) 106   Temp 99 °F (37.2 °C) (Oral)   Resp 20   Ht 1.524 m (5')   Wt 111.6 kg (246 lb 0.5 oz)   SpO2 94%   BMI 48.05 kg/m²     Last Body weight:   Wt Readings from Last 3 Encounters:   03/10/25 111.6 kg (246 lb 0.5 oz)   03/05/25 114 kg (251 lb 5.2 oz)   07/04/24 119.7 kg (263 lb 14.3 oz)       Body Mass Index : Body mass index is 48.05 kg/m².      Intake and Output summary: No intake or output data in the 24 hours ending 03/10/25 1121      Physical Examination:     PHYSICAL EXAM:    Gen: Mild acute distress  Eyes: PERRL. Anicteric sclera. No conjunctival injection.   ENT: No discharge. Posterior oropharynx clear. External appearance of ears and nose  achievable.    COMPARISON:  03/07/2025 and 03/05/2025.    HISTORY:  ORDERING SYSTEM PROVIDED HISTORY: Rule out septic emboli and chest x-ray  TECHNOLOGIST PROVIDED HISTORY:  Reason for exam:->Rule out septic emboli and chest x-ray  Additional Contrast?->1    FINDINGS:  Pulmonary Arteries: Mildly limited evaluation for pulmonary embolism due to  suboptimal contrast bolus enhancement and respiratory motion artifact.  No  large central pulmonary embolism identified.  Main pulmonary artery is mildly  dilated.    Mediastinum: No evidence of mediastinal lymphadenopathy.  The heart and  pericardium demonstrate no acute abnormality.  Question of mild cardiac left  ventricular hypertrophy.  There is no acute abnormality of the thoracic aorta.    Lungs/pleura: Multiple soft tissue nodules bilaterally with surrounding  ground-glass density, largest on the right within the upper lobe measuring  approximately 1 cm (series 7/image 73), and largest on the left within the  lower lobes measuring approximately 1.6 cm in diameter (series 7/image 47).  Largest left-sided lesion demonstrates intrinsic air concerning for  cavitation.  Findings may reflect septic emboli.  No pulmonary edema.  No  pneumothorax.  Trace left pleural fluid.    Upper Abdomen: Hepatic steatosis.  Hepatosplenomegaly.  Cholecystectomy.    Soft Tissues/Bones: No acute bone or soft tissue abnormality.    Impression  1.  Mildly limited evaluation of the pulmonary arterial system.  No central  pulmonary embolism identified.    2.  Multiple pulmonary nodules bilaterally with likely cavitation of the  largest left-sided lesion, suspicious for septic emboli.  Trace left pleural  fluid.  Imaging follow-up recommended to document resolution.    3.  Suggestion of cardiac left ventricular hypertrophy and mild pulmonary  arterial hypertension.  Follow-up echocardiogram may be useful as clinically  indicated.    4.  Hepatosplenomegaly.    5.  Additional findings, as  above.      CXR PA/LAT: Results for orders placed during the hospital encounter of 03/05/25    XR CHEST (2 VW)    Narrative  EXAMINATION:  TWO XRAY VIEWS OF THE CHEST    3/5/2025 11:16 am    COMPARISON:  06/20/2023    HISTORY:  ORDERING SYSTEM PROVIDED HISTORY: CP  TECHNOLOGIST PROVIDED HISTORY:  Reason for exam:->CP    FINDINGS:  The lungs are clear.  The cardiac silhouette is within normal limits.  There  is no pneumothorax or pleural effusion.    Impression  1.  No acute abnormality.      CXR portable: No results found for this or any previous visit.        Husam Sutherland MD, M.D.            3/10/2025, 11:21 AM

## 2025-03-10 NOTE — PROGRESS NOTES
Pharmacy Note - Extended Infusion Beta-Lactam Adjustment    Piperacillin/Tazobactam 3375mg Q8h for treatment of Hospital acquired pneumonia. Per [facility] Extended Infusion Beta-Lactam Policy, piperacillin/tazobactam will be changed to 4500mg loading dose followed by 3375mg Q8h extended infusion    Estimated Creatinine Clearance: Estimated Creatinine Clearance: 208 mL/min (A) (based on SCr of 0.4 mg/dL (L)).    BMI: Body mass index is 48.05 kg/m².    Please call with any questions.    Thank you,    Milvia Leblanc, [facility]

## 2025-03-10 NOTE — PLAN OF CARE
Problem: Discharge Planning  Goal: Discharge to home or other facility with appropriate resources  Outcome: Progressing     Problem: Pain  Goal: Verbalizes/displays adequate comfort level or baseline comfort level  Outcome: Progressing     Problem: Respiratory - Adult  Goal: Achieves optimal ventilation and oxygenation  Outcome: Progressing     Problem: Skin/Tissue Integrity - Adult  Goal: Skin integrity remains intact  Outcome: Progressing     Problem: Gastrointestinal - Adult  Goal: Minimal or absence of nausea and vomiting  Outcome: Progressing     Problem: Genitourinary - Adult  Goal: Absence of urinary retention  Outcome: Progressing     Problem: Infection - Adult  Goal: Absence of infection at discharge  Outcome: Progressing     Problem: Metabolic/Fluid and Electrolytes - Adult  Goal: Electrolytes maintained within normal limits  Outcome: Progressing     Problem: Hematologic - Adult  Goal: Maintains hematologic stability  Outcome: Progressing     Problem: Cardiovascular - Adult  Goal: Maintains optimal cardiac output and hemodynamic stability  Outcome: Progressing     Problem: ABCDS Injury Assessment  Goal: Absence of physical injury  Outcome: Progressing

## 2025-03-10 NOTE — PROGRESS NOTES
Patient alert and oriented X4. Patent request pain medicine. Patient states 10 out of 10 abdomen pain. Followed PRN pain management protocol. See MAR. Patient verbalizes understanding of education. All needed items within reach. Electronically signed by Ariadna Salinas RN on 3/10/2025 at 2:24 AM

## 2025-03-10 NOTE — PROGRESS NOTES
Pt siting on the side of the bed, pt tearful, restless, hyper-verbal. Pt states \"I can't catch my breath\". O2 saturation 98% on room air. Pt has c/o 10/10 left flank pain, described as sharp and throbbing. Pt received a one time dose of IV diluadid at 0752, pt states medication did not give her any relief. Notified Dr. Prashanth MD at bedside assessing pt. See new orders.  Electronically signed by Ariadna Powers RN on 3/10/2025 at 3:11 PM

## 2025-03-10 NOTE — PROGRESS NOTES
Infectious Disease Follow up Notes  Admit Date: 3/7/2025  Hospital Day: 4    Antibiotics :  IV ZOSYN  IV Linezolid     CHIEF COMPLAINT:     Pleuritic pain  Left side pneumonia  Multiple cavitary nodules  Immunosuppressed  Diabetes  Morbid obesity  Chronic smoking  Night sweats  Subjective interval History :  44 y.o.female  with history of plaque psoriasis managed by  Dermatology w topical steroid and risankizumab given q3 months, last in 11/2024  Last Quantiferon available for review was in 10/2023 was negative   She is now admitted to hospital secondary to left-sided pain associate with cough sweats and chills.  She also like a recent diabetes history of chronic smoking her main symptom is left-sided pleuritic pain with difficulty breathing CT chest indicating multiple cavitary lesions concern for infection      Past Medical History:    Past Medical History:   Diagnosis Date    Asthma     Fatty liver     Obesity     Psoriasis     Psoriasis        Past Surgical History:    Past Surgical History:   Procedure Laterality Date    CHOLECYSTECTOMY         Current Medications:    No outpatient medications have been marked as taking for the 3/7/25 encounter (Hospital Encounter).       Allergies:  Patient has no known allergies.    Immunizations :   There is no immunization history on file for this patient.    Social History:     Social History     Tobacco Use    Smoking status: Some Days     Current packs/day: 0.50     Types: Cigarettes    Smokeless tobacco: Never   Vaping Use    Vaping status: Never Used   Substance Use Topics    Alcohol use: Yes     Comment: once a week    Drug use: No     Social History     Tobacco Use   Smoking Status Some Days    Current packs/day: 0.50    Types: Cigarettes   Smokeless Tobacco Never      Family history : no DVT no COPD       REVIEW OF SYSTEMS:     Constitutional:  negative for fevers, chills, night

## 2025-03-10 NOTE — PLAN OF CARE
Problem: Discharge Planning  Goal: Discharge to home or other facility with appropriate resources  Outcome: Progressing     Problem: Pain  Goal: Verbalizes/displays adequate comfort level or baseline comfort level  Outcome: Progressing     Problem: Respiratory - Adult  Goal: Achieves optimal ventilation and oxygenation  Outcome: Progressing  Flowsheets (Taken 3/9/2025 2000)  Achieves optimal ventilation and oxygenation: Assess for changes in respiratory status     Problem: Skin/Tissue Integrity - Adult  Goal: Skin integrity remains intact  Outcome: Progressing     Problem: Gastrointestinal - Adult  Goal: Minimal or absence of nausea and vomiting  Outcome: Progressing  Flowsheets (Taken 3/9/2025 2000)  Minimal or absence of nausea and vomiting:   Administer ordered antiemetic medications as needed   Provide nonpharmacologic comfort measures as appropriate     Problem: Genitourinary - Adult  Goal: Absence of urinary retention  Outcome: Progressing     Problem: Infection - Adult  Goal: Absence of infection at discharge  Outcome: Progressing  Flowsheets (Taken 3/9/2025 2000)  Absence of infection at discharge: Assess and monitor for signs and symptoms of infection     Problem: Metabolic/Fluid and Electrolytes - Adult  Goal: Electrolytes maintained within normal limits  Outcome: Progressing     Problem: Hematologic - Adult  Goal: Maintains hematologic stability  Outcome: Progressing  Flowsheets (Taken 3/9/2025 2000)  Maintains hematologic stability: Assess for signs and symptoms of bleeding or hemorrhage     Problem: Cardiovascular - Adult  Goal: Maintains optimal cardiac output and hemodynamic stability  Outcome: Progressing  Flowsheets (Taken 3/9/2025 2000)  Maintains optimal cardiac output and hemodynamic stability: Monitor blood pressure and heart rate

## 2025-03-10 NOTE — PROGRESS NOTES
Patient alert and oriented x4. Vital signs taken and recorded. Patient is for IV insertion using ultrasound.    0600H IV inserted in right antecubital vein. Patient complained of pain with score of 10/10. PRN for pain given per protocol. See mar.

## 2025-03-11 ENCOUNTER — APPOINTMENT (OUTPATIENT)
Dept: GENERAL RADIOLOGY | Age: 45
DRG: 193 | End: 2025-03-11
Payer: COMMERCIAL

## 2025-03-11 PROBLEM — R06.02 SHORTNESS OF BREATH: Status: ACTIVE | Noted: 2025-03-11

## 2025-03-11 PROBLEM — Z22.322 MRSA COLONIZATION: Status: ACTIVE | Noted: 2025-03-11

## 2025-03-11 PROBLEM — R79.82 CRP ELEVATED: Status: ACTIVE | Noted: 2025-03-11

## 2025-03-11 PROBLEM — R93.89 ABNORMAL CT OF THE CHEST: Status: ACTIVE | Noted: 2025-03-11

## 2025-03-11 PROBLEM — E66.01 MORBID OBESITY WITH BMI OF 45.0-49.9, ADULT: Status: ACTIVE | Noted: 2025-03-11

## 2025-03-11 PROBLEM — R70.0 ELEVATED ERYTHROCYTE SEDIMENTATION RATE: Status: ACTIVE | Noted: 2025-03-11

## 2025-03-11 PROBLEM — I26.90 ACUTE SEPTIC PULMONARY EMBOLISM (HCC): Status: ACTIVE | Noted: 2025-03-11

## 2025-03-11 PROBLEM — J98.4 CAVITARY PNEUMONIA: Status: ACTIVE | Noted: 2025-03-11

## 2025-03-11 PROBLEM — F17.200 SMOKING: Status: ACTIVE | Noted: 2025-03-11

## 2025-03-11 PROBLEM — E11.65 POORLY CONTROLLED DIABETES MELLITUS (HCC): Status: ACTIVE | Noted: 2025-03-11

## 2025-03-11 PROBLEM — J18.9 CAVITARY PNEUMONIA: Status: ACTIVE | Noted: 2025-03-11

## 2025-03-11 LAB
(1,3)-BETA-D-GLUCAN (FUNGITELL) INTERPRETATION: POSITIVE
1,3 BETA GLUCAN SER-MCNC: >500 PG/ML
ANION GAP SERPL CALCULATED.3IONS-SCNC: 9 MMOL/L (ref 3–16)
BACTERIA BLD CULT ORG #2: NORMAL
BACTERIA BLD CULT: NORMAL
BASOPHILS # BLD: 0 K/UL (ref 0–0.2)
BASOPHILS NFR BLD: 0.3 %
BUN SERPL-MCNC: 9 MG/DL (ref 7–20)
CALCIUM SERPL-MCNC: 8.9 MG/DL (ref 8.3–10.6)
CHLORIDE SERPL-SCNC: 97 MMOL/L (ref 99–110)
CO2 SERPL-SCNC: 27 MMOL/L (ref 21–32)
CREAT SERPL-MCNC: 0.6 MG/DL (ref 0.6–1.1)
CRYPTOC AG SER QL IA: NEGATIVE
DEPRECATED RDW RBC AUTO: 11.8 % (ref 12.4–15.4)
EOSINOPHIL # BLD: 0.1 K/UL (ref 0–0.6)
EOSINOPHIL NFR BLD: 1.1 %
GAMMA INTERFERON BACKGROUND BLD IA-ACNC: 0.01 IU/ML
GFR SERPLBLD CREATININE-BSD FMLA CKD-EPI: >90 ML/MIN/{1.73_M2}
GLUCOSE BLD-MCNC: 197 MG/DL (ref 70–99)
GLUCOSE BLD-MCNC: 211 MG/DL (ref 70–99)
GLUCOSE BLD-MCNC: 213 MG/DL (ref 70–99)
GLUCOSE BLD-MCNC: 261 MG/DL (ref 70–99)
GLUCOSE SERPL-MCNC: 202 MG/DL (ref 70–99)
HCT VFR BLD AUTO: 36.3 % (ref 36–48)
HGB BLD-MCNC: 12.8 G/DL (ref 12–16)
LYMPHOCYTES # BLD: 1 K/UL (ref 1–5.1)
LYMPHOCYTES NFR BLD: 9.7 %
M TB IFN-G BLD-IMP: NEGATIVE
M TB IFN-G CD4+ BCKGRND COR BLD-ACNC: 0.07 IU/ML
M TB IFN-G CD4+CD8+ BCKGRND COR BLD-ACNC: 0.09 IU/ML
MCH RBC QN AUTO: 33.4 PG (ref 26–34)
MCHC RBC AUTO-ENTMCNC: 35.3 G/DL (ref 31–36)
MCV RBC AUTO: 94.8 FL (ref 80–100)
MITOGEN IGNF BCKGRD COR BLD-ACNC: 9.99 IU/ML
MONOCYTES # BLD: 1 K/UL (ref 0–1.3)
MONOCYTES NFR BLD: 9.5 %
MYELOPEROXIDASE AB SER-ACNC: 1 AU/ML (ref 0–19)
NEUTROPHILS # BLD: 8.2 K/UL (ref 1.7–7.7)
NEUTROPHILS NFR BLD: 79.4 %
PERFORMED ON: ABNORMAL
PLATELET # BLD AUTO: 298 K/UL (ref 135–450)
PMV BLD AUTO: 7.5 FL (ref 5–10.5)
POTASSIUM SERPL-SCNC: 4.8 MMOL/L (ref 3.5–5.1)
PROTEINASE3 AB SER-ACNC: 4 AU/ML (ref 0–19)
RBC # BLD AUTO: 3.83 M/UL (ref 4–5.2)
SODIUM SERPL-SCNC: 133 MMOL/L (ref 136–145)
WBC # BLD AUTO: 10.3 K/UL (ref 4–11)

## 2025-03-11 PROCEDURE — 1200000000 HC SEMI PRIVATE

## 2025-03-11 PROCEDURE — 6360000002 HC RX W HCPCS: Performed by: INTERNAL MEDICINE

## 2025-03-11 PROCEDURE — 6370000000 HC RX 637 (ALT 250 FOR IP): Performed by: HOSPITALIST

## 2025-03-11 PROCEDURE — 6360000002 HC RX W HCPCS: Performed by: HOSPITALIST

## 2025-03-11 PROCEDURE — 99233 SBSQ HOSP IP/OBS HIGH 50: CPT | Performed by: INTERNAL MEDICINE

## 2025-03-11 PROCEDURE — 80048 BASIC METABOLIC PNL TOTAL CA: CPT

## 2025-03-11 PROCEDURE — 85025 COMPLETE CBC W/AUTO DIFF WBC: CPT

## 2025-03-11 PROCEDURE — 71045 X-RAY EXAM CHEST 1 VIEW: CPT

## 2025-03-11 PROCEDURE — 6370000000 HC RX 637 (ALT 250 FOR IP): Performed by: STUDENT IN AN ORGANIZED HEALTH CARE EDUCATION/TRAINING PROGRAM

## 2025-03-11 PROCEDURE — 36415 COLL VENOUS BLD VENIPUNCTURE: CPT

## 2025-03-11 PROCEDURE — 2580000003 HC RX 258: Performed by: INTERNAL MEDICINE

## 2025-03-11 RX ADMIN — LINEZOLID 600 MG: 600 INJECTION, SOLUTION INTRAVENOUS at 06:56

## 2025-03-11 RX ADMIN — METHOCARBAMOL 1000 MG: 500 TABLET ORAL at 08:51

## 2025-03-11 RX ADMIN — OXYCODONE HYDROCHLORIDE AND ACETAMINOPHEN 1 TABLET: 5; 325 TABLET ORAL at 15:59

## 2025-03-11 RX ADMIN — MUPIROCIN: 20 OINTMENT TOPICAL at 21:00

## 2025-03-11 RX ADMIN — OXYCODONE HYDROCHLORIDE AND ACETAMINOPHEN 1 TABLET: 5; 325 TABLET ORAL at 00:50

## 2025-03-11 RX ADMIN — PIPERACILLIN AND TAZOBACTAM 3375 MG: 3; .375 INJECTION, POWDER, LYOPHILIZED, FOR SOLUTION INTRAVENOUS at 00:55

## 2025-03-11 RX ADMIN — INSULIN GLARGINE 18 UNITS: 100 INJECTION, SOLUTION SUBCUTANEOUS at 21:00

## 2025-03-11 RX ADMIN — METHOCARBAMOL 1000 MG: 500 TABLET ORAL at 18:43

## 2025-03-11 RX ADMIN — OXYCODONE HYDROCHLORIDE AND ACETAMINOPHEN 1 TABLET: 5; 325 TABLET ORAL at 21:59

## 2025-03-11 RX ADMIN — ENOXAPARIN SODIUM 30 MG: 100 INJECTION SUBCUTANEOUS at 21:01

## 2025-03-11 RX ADMIN — MUPIROCIN: 20 OINTMENT TOPICAL at 08:52

## 2025-03-11 RX ADMIN — OXYCODONE HYDROCHLORIDE AND ACETAMINOPHEN 1 TABLET: 5; 325 TABLET ORAL at 08:59

## 2025-03-11 RX ADMIN — PIPERACILLIN AND TAZOBACTAM 4500 MG: 4; .5 INJECTION, POWDER, LYOPHILIZED, FOR SOLUTION INTRAVENOUS at 18:49

## 2025-03-11 RX ADMIN — INSULIN LISPRO 2 UNITS: 100 INJECTION, SOLUTION INTRAVENOUS; SUBCUTANEOUS at 21:01

## 2025-03-11 RX ADMIN — INSULIN LISPRO 2 UNITS: 100 INJECTION, SOLUTION INTRAVENOUS; SUBCUTANEOUS at 12:58

## 2025-03-11 RX ADMIN — INSULIN LISPRO 4 UNITS: 100 INJECTION, SOLUTION INTRAVENOUS; SUBCUTANEOUS at 08:51

## 2025-03-11 RX ADMIN — METHOCARBAMOL 1000 MG: 500 TABLET ORAL at 21:00

## 2025-03-11 RX ADMIN — INSULIN LISPRO 2 UNITS: 100 INJECTION, SOLUTION INTRAVENOUS; SUBCUTANEOUS at 18:42

## 2025-03-11 RX ADMIN — ENOXAPARIN SODIUM 30 MG: 100 INJECTION SUBCUTANEOUS at 08:51

## 2025-03-11 RX ADMIN — METHOCARBAMOL 1000 MG: 500 TABLET ORAL at 12:58

## 2025-03-11 RX ADMIN — SENNOSIDES AND DOCUSATE SODIUM 2 TABLET: 50; 8.6 TABLET ORAL at 08:51

## 2025-03-11 RX ADMIN — LINEZOLID 600 MG: 600 INJECTION, SOLUTION INTRAVENOUS at 18:50

## 2025-03-11 RX ADMIN — BENZONATATE 100 MG: 100 CAPSULE ORAL at 16:00

## 2025-03-11 RX ADMIN — GUAIFENESIN 600 MG: 600 TABLET ORAL at 08:51

## 2025-03-11 RX ADMIN — PIPERACILLIN AND TAZOBACTAM 4500 MG: 4; .5 INJECTION, POWDER, LYOPHILIZED, FOR SOLUTION INTRAVENOUS at 08:56

## 2025-03-11 RX ADMIN — GUAIFENESIN 600 MG: 600 TABLET ORAL at 21:00

## 2025-03-11 ASSESSMENT — PAIN DESCRIPTION - PAIN TYPE
TYPE: ACUTE PAIN

## 2025-03-11 ASSESSMENT — PAIN SCALES - GENERAL
PAINLEVEL_OUTOF10: 6
PAINLEVEL_OUTOF10: 5
PAINLEVEL_OUTOF10: 2
PAINLEVEL_OUTOF10: 9
PAINLEVEL_OUTOF10: 8
PAINLEVEL_OUTOF10: 9
PAINLEVEL_OUTOF10: 6

## 2025-03-11 ASSESSMENT — PAIN DESCRIPTION - DESCRIPTORS
DESCRIPTORS: SHARP;THROBBING
DESCRIPTORS: SHARP;THROBBING
DESCRIPTORS: ACHING;DISCOMFORT
DESCRIPTORS: SHARP;THROBBING
DESCRIPTORS: ACHING

## 2025-03-11 ASSESSMENT — PAIN - FUNCTIONAL ASSESSMENT
PAIN_FUNCTIONAL_ASSESSMENT: PREVENTS OR INTERFERES SOME ACTIVE ACTIVITIES AND ADLS
PAIN_FUNCTIONAL_ASSESSMENT: ACTIVITIES ARE NOT PREVENTED

## 2025-03-11 ASSESSMENT — PAIN DESCRIPTION - ONSET
ONSET: ON-GOING

## 2025-03-11 ASSESSMENT — PAIN DESCRIPTION - ORIENTATION
ORIENTATION: LEFT
ORIENTATION: LEFT
ORIENTATION: RIGHT;LEFT

## 2025-03-11 ASSESSMENT — PAIN DESCRIPTION - LOCATION
LOCATION: FLANK
LOCATION: FLANK
LOCATION: ABDOMEN
LOCATION: ABDOMEN;RIB CAGE
LOCATION: ABDOMEN;RIB CAGE
LOCATION: FLANK

## 2025-03-11 ASSESSMENT — PAIN DESCRIPTION - FREQUENCY
FREQUENCY: CONTINUOUS

## 2025-03-11 NOTE — PROGRESS NOTES
Pulmonary Progress Note     Patient's name:  Stacie Donato  Medical Record Number: [id number]  Patient's account/billing number: [id number]  Patient's YOB: 1980  Age: 44 y.o.  Date of Admission: 3/7/2025 10:17 AM  Date of Consult: 3/11/2025      Primary Care Physician: No primary care provider on file.      Code Status: Full Code    Reason for consult: bilateral lung nodules     Assessment and Plan     Bilateral lung nodules some with cavitation  Strep agalactiae  Hyponatremia  Recent history of dental issues       Plan:  Antibiotics per ID  Check chest x-ray in the a.m.  Blood cultures  ECHO results reviewed  SHOAIB negative             HISTORY OF PRESENT ILLNESS:   /Ms. Stacie Donato is a 44 y.o. lady with past medical history stated below significant for history of asthma, obesity, presented to the emergency department with left side pleuritic chest pain,  We were consulted for lung nodules and small effusion        Physical Exam:    Vitals: /87   Pulse 94   Temp 97.9 °F (36.6 °C) (Oral)   Resp 18   Ht 1.524 m (5')   Wt 112.5 kg (248 lb 0.3 oz)   SpO2 94%   BMI 48.44 kg/m²     Last Body weight:   Wt Readings from Last 3 Encounters:   03/11/25 112.5 kg (248 lb 0.3 oz)   03/05/25 114 kg (251 lb 5.2 oz)   07/04/24 119.7 kg (263 lb 14.3 oz)       Body Mass Index : Body mass index is 48.44 kg/m².      Intake and Output summary:   Intake/Output Summary (Last 24 hours) at 3/11/2025 1155  Last data filed at 3/11/2025 1032  Gross per 24 hour   Intake 1200 ml   Output 1300 ml   Net -100 ml         Physical Examination:     PHYSICAL EXAM:    Gen: Mild acute distress  Eyes: PERRL. Anicteric sclera. No conjunctival injection.   ENT: No discharge. Posterior oropharynx clear. External appearance of ears and nose normal.  Neck: Trachea midline. No mass, no lymphadenopathy    Resp: Clear to auscultation bilaterally  CV: Regular rate. Regular rhythm.   am    COMPARISON:  06/20/2023    HISTORY:  ORDERING SYSTEM PROVIDED HISTORY: CP  TECHNOLOGIST PROVIDED HISTORY:  Reason for exam:->CP    FINDINGS:  The lungs are clear.  The cardiac silhouette is within normal limits.  There  is no pneumothorax or pleural effusion.    Impression  1.  No acute abnormality.      CXR portable: No results found for this or any previous visit.        Husam Sutherland MD, M.D.            3/11/2025, 11:55 AM

## 2025-03-11 NOTE — PLAN OF CARE
Problem: Discharge Planning  Goal: Discharge to home or other facility with appropriate resources  3/10/2025 2313 by Nicholas Boyle RN  Outcome: Progressing     Problem: Pain  Goal: Verbalizes/displays adequate comfort level or baseline comfort level  3/10/2025 2313 by Nicholas Boyle RN  Outcome: Progressing     Problem: Respiratory - Adult  Goal: Achieves optimal ventilation and oxygenation  3/10/2025 2313 by Nicholas Boyle RN  Outcome: Progressing  Flowsheets (Taken 3/10/2025 2000)  Achieves optimal ventilation and oxygenation:   Assess for changes in respiratory status   Assess for changes in mentation and behavior   Position to facilitate oxygenation and minimize respiratory effort     Problem: Skin/Tissue Integrity - Adult  Goal: Skin integrity remains intact  3/10/2025 2313 by Nicholas Boyle RN  Outcome: Progressing     Problem: Gastrointestinal - Adult  Goal: Minimal or absence of nausea and vomiting  3/10/2025 2313 by Nicholas Boyle RN  Outcome: Progressing  Flowsheets (Taken 3/10/2025 2000)  Minimal or absence of nausea and vomiting:   Administer ordered antiemetic medications as needed   Provide nonpharmacologic comfort measures as appropriate     Problem: Genitourinary - Adult  Goal: Absence of urinary retention  3/10/2025 2313 by Nicholas Boyle RN  Outcome: Progressing  Flowsheets (Taken 3/10/2025 2000)  Absence of urinary retention: Monitor intake/output and perform bladder scan as needed     Problem: Infection - Adult  Goal: Absence of infection at discharge  3/10/2025 2313 by Nicholas Boyle RN  Outcome: Progressing  Flowsheets (Taken 3/10/2025 2000)  Absence of infection at discharge:   Assess and monitor for signs and symptoms of infection   Monitor lab/diagnostic results   Monitor all insertion sites i.e., indwelling lines, tubes and drains   Administer medications as ordered     Problem: Metabolic/Fluid and Electrolytes - Adult  Goal: Electrolytes maintained within normal  limits  3/10/2025 2313 by Nicholas Boyle RN  Outcome: Progressing  Flowsheets (Taken 3/10/2025 2000)  Electrolytes maintained within normal limits:   Monitor labs and assess patient for signs and symptoms of electrolyte imbalances   Administer electrolyte replacement as ordered     Problem: Hematologic - Adult  Goal: Maintains hematologic stability  3/10/2025 2313 by Nicholas Boyle RN  Outcome: Progressing  Flowsheets (Taken 3/10/2025 2000)  Maintains hematologic stability: Assess for signs and symptoms of bleeding or hemorrhage     Problem: Cardiovascular - Adult  Goal: Maintains optimal cardiac output and hemodynamic stability  3/10/2025 2313 by Nicholas Boyle RN  Outcome: Progressing  Flowsheets (Taken 3/10/2025 2000)  Maintains optimal cardiac output and hemodynamic stability:   Monitor blood pressure and heart rate   Monitor urine output and notify Licensed Independent Practitioner for values outside of normal range   Assess for signs of decreased cardiac output     Problem: ABCDS Injury Assessment  Goal: Absence of physical injury  3/10/2025 2313 by Nicholas Boyle RN  Outcome: Progressing

## 2025-03-11 NOTE — PROGRESS NOTES
Pt resting in bed , pt has c/o 8/10 right and left flank pain, described as sharp and throbbing, administered prn Oxycodone per MAR. Alert and oriented x4. VSS. Pt ambulating around room, up as tolerated. Pt has loss of appetite, did not eat breakfast. Pt states she had diaphoresis overnight, but resolved this morning. Bed/chair in lowest and locked position, bedside table within reach, call light within reach and pt encouraged to call for any needs or ambulation, side rails up x2, bed/chair alarm deferred. Pt denies any needs at this time.  Electronically signed by Ariadna Powers RN on 3/11/2025 at 9:00 AM

## 2025-03-11 NOTE — PROGRESS NOTES
Family history : no DVT no COPD       REVIEW OF SYSTEMS:     Constitutional:  negative for fevers, chills, night sweats  Eyes:  negative for blurred vision, eye discharge, visual disturbance   HEENT:  negative for hearing loss, ear drainage,nasal congestion  Respiratory:  ++ r cough,+  shortness of breath or hemoptysis   Cardiovascular:  negative for chest pain, palpitations, syncope  Gastrointestinal:  negative for nausea, vomiting, diarrhea, constipation, abdominal pain  Genitourinary:  negative for frequency, dysuria, urinary incontinence, hematuria  Hematologic/Lymphatic:  negative for easy bruising, bleeding and lymphadenopathy  Allergic/Immunologic:  negative for recurrent infections, angioedema, anaphylaxis   Endocrine:  negative for weight changes, polyuria, polydipsia and polyphagia  Musculoskeletal:  negative for joint  pain, swelling, decreased range of motion  Integumentary: No rashes, skin lesions  Neurological:  negative for headaches, slurred speech, unilateral weakness  Psychiatric: negative for hallucinations,confusion,agitation.                PHYSICAL EXAM:      Vitals:    /87   Pulse 94   Temp 97.9 °F (36.6 °C) (Oral)   Resp 18   Ht 1.524 m (5')   Wt 112.5 kg (248 lb 0.3 oz)   SpO2 94%   BMI 48.44 kg/m²     General Appearance: alert,in no acute distress, +  pallor, no icterus morbid obesity poor dentition  Skin: warm and dry, no rash or erythema  Head: normocephalic and atraumatic  Eyes: pupils equal, round, and reactive to light, conjunctivae normal  ENT: tympanic membrane, external ear and ear canal normal bilaterally, nose without deformity, nasal mucosa and turbinates normal without polyps  Neck: supple and non-tender without mass, no thyromegaly  no cervical lymphadenopathy  Pulmonary/Chest: Left base coarse crepitation- no wheezes, rales or rhonchi, normal air movement, no respiratory distress  Cardiovascular: normal rate, regular rhythm, normal S1 and S2, no murmurs,  rubs, clicks, or gallops, no carotid bruits  Abdomen: soft, non-tender, non-distended, normal bowel sounds, no masses or organomegaly  Extremities: no cyanosis, clubbing or edema  Musculoskeletal: normal range of motion, no joint swelling, deformity or tenderness  Integumentary: No rashes, no abnormal skin lesions, no petechiae  Neurologic: reflexes normal and symmetric, no cranial nerve deficit  Psych:  Orientation, sensorium, mood normal  Lines:  IV       Data Review:    CBC:   Lab Results   Component Value Date    WBC 10.3 03/11/2025    HGB 12.8 03/11/2025    HCT 36.3 03/11/2025    MCV 94.8 03/11/2025     03/11/2025     RENAL:   Lab Results   Component Value Date    CREATININE 0.6 03/11/2025    BUN 9 03/11/2025     (L) 03/11/2025    K 4.8 03/11/2025    CL 97 (L) 03/11/2025    CO2 27 03/11/2025     SED RATE:   Lab Results   Component Value Date/Time    SEDRATE 60 03/08/2025 05:46 AM     CK: No results found for: \"CKTOTAL\"  CRP:   Lab Results   Component Value Date/Time    CRP 86.3 03/09/2025 05:26 AM     Hepatic Function Panel:   Lab Results   Component Value Date/Time    ALKPHOS 137 03/07/2025 11:24 AM    ALT 38 03/07/2025 11:24 AM    AST 41 03/07/2025 11:24 AM    BILITOT 0.8 03/07/2025 11:24 AM    BILIDIR 0.0 03/05/2012 06:30 PM     UA:  Lab Results   Component Value Date/Time    COLORU DARK YELLOW 03/05/2025 12:19 PM    CLARITYU Clear 03/05/2025 12:19 PM    GLUCOSEU >=1000 03/05/2025 12:19 PM    GLUCOSEU Negative 03/05/2012 06:50 PM    BILIRUBINUR Negative 03/05/2025 12:19 PM    KETUA TRACE 03/05/2025 12:19 PM    BLOODU Negative 03/05/2025 12:19 PM    PHUR 5.5 03/08/2025 12:19 PM    PHUR 6.0 07/02/2019 08:00 PM    PROTEINU TRACE 03/05/2025 12:19 PM    UROBILINOGEN 1.0 03/05/2025 12:19 PM    NITRU Negative 03/05/2025 12:19 PM    LEUKOCYTESUR Negative 03/05/2025 12:19 PM    URINETYPE NotGiven 03/05/2025 12:19 PM      Urine Microscopic:   Lab Results   Component Value Date/Time    BACTERIA 1+  bottle.~Blood Culture 1   Culture, Blood 2 [[id number]] Collected: 03/07/25 1758   Order Status: Completed Specimen: Blood Updated: 03/08/25 1915    Culture, Blood 2 No Growth to date.  Any change in status will be called.   Narrative:     ORDER#: [id number]                          ORDERED BY: ODALYS LEDBETTER  SOURCE: Blood                              COLLECTED:  03/07/25 17:58  ANTIBIOTICS AT JERROD.:                      RECEIVED :  03/07/25 18:14  If child <=2 yrs old please draw pediatric bottle.~Blood Culture #2   MRSA DNA Probe, Nasal [[id number]] (Abnormal) Collected: 03/08/25 1219   Order Status: Completed Specimen: Nares Updated: 03/08/25 1814    Organism Staph aureus MRSA Abnormal     MRSA SCREEN RT-PCR -- Abnormal     POSITIVE for  Normal Range: Not detected  CONTACT PRECAUTIONS INDICATED   Abnormal    Narrative:     ORDER#: [id number]                          ORDERED BY: EDY CHAVARRIA  SOURCE: Nares                              COLLECTED:  03/08/25 12:19  ANTIBIOTICS AT JERROD.:                      RECEIVED :  03/08/25 12:38  CALL  [address] tel. [phone],  Microbiology results called to and read back by ANGELICA Quiñonez, 03/08/2025  18:14, by [facility]   Pneumonia Panel, Molecular [[id number]] Collected: 03/07/25 0000   Order Status: Canceled Specimen: Sputum Expectorated        Blood Culture: imeQuantiferon TB Gold [[id number]]Collected: 03/09/25 1636Order Status: SentSpecimen: BloodUpdated: 03/09/25 1643Strep Pneumoniae Antigen [[id number]]Collected: 03/08/25 1219Order Status: CompletedSpecimen: Urine, clean catchUpdated: 03/09/25 1004STREP PNEUMONIAE ANTIGEN, URINE--Presumptive Negative  Presumptive negative suggests no current or recent  pneumococcal infection. Infection due to Strep pneumoniae  cannot be ruled out since the antigen present in the sample  may be below the detection limit of the test.  Normal Range:Presumptive Negative  Narrative:  ORDER#: [id number]                          ORDERED

## 2025-03-11 NOTE — PROGRESS NOTES
V2.0    [facility] [place]      Name:  Stacie Donato /Age/Sex: 1980  (44 y.o. female)   MRN & CSN:  [id number] & [id number] Encounter Date/Time: 3/11/2025 8:35 AM EDT   Location:  [address]- PCP: No primary care provider on file.     Attending:David Dasilva DO       Hospital Day: 5  Brief Hospital Course  Stacie Donato is a 44 y.o. female with pertinent PMHx of T2DM who presented complaining of pleuritic chest pain.  She was admitted with left-sided pleural effusion.  Assessment & Plan     Bilateral pulmonary nodules with cavitation  -SHOAIB negative, ANCA pending  -TTE with no evidence of vegetation  -Continue empiric antibiotics with IV Zosyn and linezolid  -Pneumonia panel and respiratory culture positive for strep agalactiae, MRSA nares positive  -Pulmonology following, note reviewed today 3/11 recommending repeat chest x-ray tomorrow    Type 2 diabetes  -Lantus 18 units nightly  -Lispro 5 units 3 times daily with meals  -Medium dose sliding scale correctional insulin 4 times a day with meals and nightly  -Check point-of-care glucose 4 times daily with meals and nightly to monitor for hypoglycemia from insulin toxicity      Diet ADULT DIET; Regular; 4 carb choices (60 gm/meal)   DVT Prophylaxis [x] Lovenox, []  Heparin, [] SCDs, [] Ambulation,  [] Eliquis, [] Xarelto  [] Coumadin   Code Status Full Code   Disposition From: Home  Expected Disposition: Home  Estimated Date of Discharge: 3/14           Subjective:     Chief Complaint: Chest pain    Patient was seen and examined today lying in bed  Reports improvement in her pleuritic chest pain, doing okay currently, denies any shortness of breath, denies any cough though has been starting to produce a little bit of sputum she says  Has not noticed any fevers or chills    Review of Systems:      Pertinent positives and negatives discussed in HPI    Objective:     Intake/Output Summary (Last 24 hours) at 3/11/2025 0835  Last data filed at  in the subpleural region.  There are 3-4 mm subpleural nodules in the right middle and lower lobes (image 8, and 27). Organs: Status post cholecystectomy.  The liver is slightly decreased in attenuation.  The liver, spleen, pancreas and adrenal glands are without acute focal abnormality.  No renal or ureteral calculus.  No hydronephrosis. Mild nonspecific perinephric stranding bilaterally.  No biliary duct dilation. GI/Bowel: No dilated loops of bowel or bowel wall thickening.  Colonic diverticulosis without acute diverticulitis.  No free air.  The appendix is normal. Pelvis: The bladder is not well-distended.  No pathologically enlarged adenopathy or free fluid.  There is likely a subserosal left lower uterine segment fibroid measuring up to at least 6.0 cm.  The uterus and ovaries are otherwise grossly unremarkable. Peritoneum/Retroperitoneum: The aorta is normal in caliber with mild atherosclerosis.  The celiac axis, SMA and MICHAEL are patent.  The portal venous system is patent.  No ascites or drainable fluid collection.  No pathologically enlarged adenopathy. Bones/Soft Tissues: No acute findings in the bones or soft tissues.     1. No acute abdominal or pelvic abnormality. 2. Mild hepatic steatosis. 3. 6.0 cm subserosal left lower uterine segment fibroid. 4. Trace left pleural effusion with nodular opacities in the left lower lobe the largest measuring up to 16 mm. Findings may be infectious or inflammatory in etiology.  Septic emboli could be a possibility in the appropriate clinical setting.  Recommend dedicated CT of the chest for further evaluation.     CT CHEST PULMONARY EMBOLISM W CONTRAST  Result Date: 3/7/2025  EXAMINATION: CTA OF THE CHEST 3/7/2025 2:17 pm TECHNIQUE: CTA of the chest was performed after the administration of intravenous contrast.  Multiplanar reformatted images are provided for review.  MIP images are provided for review. Automated exposure control, iterative reconstruction, and/or  5.  Additional findings, as above.     XR CHEST (2 VW)  Result Date: 3/5/2025  EXAMINATION: TWO XRAY VIEWS OF THE CHEST 3/5/2025 11:16 am COMPARISON: 06/20/2023 HISTORY: ORDERING SYSTEM PROVIDED HISTORY: CP TECHNOLOGIST PROVIDED HISTORY: Reason for exam:->CP FINDINGS: The lungs are clear.  The cardiac silhouette is within normal limits.  There is no pneumothorax or pleural effusion.     1.  No acute abnormality.       CBC:   Recent Labs     03/10/25  0514 03/11/25  0557   WBC 8.0 10.3   HGB 12.3 12.8    298     BMP:    Recent Labs     03/10/25  0514 03/11/25  0557    133*   K 3.7 4.8    97*   CO2 26 27   BUN 9 9   CREATININE 0.4* 0.6   GLUCOSE 215* 202*     Hepatic: No results for input(s): \"AST\", \"ALT\", \"BILITOT\", \"ALKPHOS\" in the last 72 hours.    Invalid input(s): \"ALB\"  Lipids: No results found for: \"CHOL\", \"HDL\", \"TRIG\"  Hemoglobin A1C:   Lab Results   Component Value Date/Time    LABA1C 10.3 03/08/2025 12:07 PM     TSH: No results found for: \"TSH\"  Troponin: No results found for: \"TROPONINT\"  Lactic Acid: No results for input(s): \"LACTA\" in the last 72 hours.  BNP: No results for input(s): \"PROBNP\" in the last 72 hours.  UA:  Lab Results   Component Value Date/Time    NITRU Negative 03/05/2025 12:19 PM    COLORU DARK YELLOW 03/05/2025 12:19 PM    PHUR 5.5 03/08/2025 12:19 PM    PHUR 6.0 07/02/2019 08:00 PM    WBCUA 0-2 03/05/2025 12:19 PM    RBCUA None seen 03/05/2025 12:19 PM    TRICHOMONAS Present 03/05/2012 06:30 PM    BACTERIA 1+ 03/05/2025 12:19 PM    CLARITYU Clear 03/05/2025 12:19 PM    LEUKOCYTESUR Negative 03/05/2025 12:19 PM    UROBILINOGEN 1.0 03/05/2025 12:19 PM    BILIRUBINUR Negative 03/05/2025 12:19 PM    BLOODU Negative 03/05/2025 12:19 PM    GLUCOSEU >=1000 03/05/2025 12:19 PM    GLUCOSEU Negative 03/05/2012 06:50 PM    KETUA TRACE 03/05/2025 12:19 PM     Urine Cultures: No results found for: \"LABURIN\"  Blood Cultures:   Lab Results   Component Value Date/Time    BC

## 2025-03-11 NOTE — PROGRESS NOTES
Patient alert and oriented x4. Vital signs taken and recorded. Patient tolerated night medicines. Patient's items within reach.

## 2025-03-11 NOTE — PLAN OF CARE
Problem: Discharge Planning  Goal: Discharge to home or other facility with appropriate resources  3/11/2025 1134 by Ariadna Santillan RN  Outcome: Progressing  3/10/2025 2313 by Nicholas Boyle RN  Outcome: Progressing     Problem: Pain  Goal: Verbalizes/displays adequate comfort level or baseline comfort level  3/11/2025 1134 by Aridana Santillan RN  Outcome: Progressing  3/10/2025 2313 by Nicholas Boyle RN  Outcome: Progressing     Problem: Respiratory - Adult  Goal: Achieves optimal ventilation and oxygenation  3/11/2025 1134 by Ariadna Santillan RN  Outcome: Progressing  3/10/2025 2313 by Nicholas Boyle RN  Outcome: Progressing  Flowsheets (Taken 3/10/2025 2000)  Achieves optimal ventilation and oxygenation:   Assess for changes in respiratory status   Assess for changes in mentation and behavior   Position to facilitate oxygenation and minimize respiratory effort     Problem: Skin/Tissue Integrity - Adult  Goal: Skin integrity remains intact  3/11/2025 1134 by Aridana Santillan RN  Outcome: Progressing  3/10/2025 2313 by Nicholas Boyle RN  Outcome: Progressing     Problem: Gastrointestinal - Adult  Goal: Minimal or absence of nausea and vomiting  3/11/2025 1134 by Ariadna Santillan RN  Outcome: Progressing  3/10/2025 2313 by Nicholas Boyle RN  Outcome: Progressing  Flowsheets (Taken 3/10/2025 2000)  Minimal or absence of nausea and vomiting:   Administer ordered antiemetic medications as needed   Provide nonpharmacologic comfort measures as appropriate     Problem: Genitourinary - Adult  Goal: Absence of urinary retention  3/11/2025 1134 by Ariadna Santillan RN  Outcome: Progressing  3/10/2025 2313 by Nicholas Boyle RN  Outcome: Progressing  Flowsheets (Taken 3/10/2025 2000)  Absence of urinary retention: Monitor intake/output and perform bladder scan as needed     Problem: Infection - Adult  Goal: Absence of infection at discharge  3/11/2025 1134 by Ariadna Santillan

## 2025-03-12 ENCOUNTER — APPOINTMENT (OUTPATIENT)
Dept: ULTRASOUND IMAGING | Age: 45
DRG: 193 | End: 2025-03-12
Payer: COMMERCIAL

## 2025-03-12 ENCOUNTER — APPOINTMENT (OUTPATIENT)
Dept: GENERAL RADIOLOGY | Age: 45
DRG: 193 | End: 2025-03-12
Payer: COMMERCIAL

## 2025-03-12 LAB
ALBUMIN FLD-MCNC: 2.6 G/DL
ANION GAP SERPL CALCULATED.3IONS-SCNC: 13 MMOL/L (ref 3–16)
APPEARANCE FLUID: NORMAL
BASOPHILS # BLD: 0.1 K/UL (ref 0–0.2)
BASOPHILS NFR BLD: 0.9 %
BDY FLUID QUALITY: NORMAL
BUN SERPL-MCNC: 8 MG/DL (ref 7–20)
CALCIUM SERPL-MCNC: 8.7 MG/DL (ref 8.3–10.6)
CELL COUNT FLUID TYPE: NORMAL
CHLORIDE SERPL-SCNC: 95 MMOL/L (ref 99–110)
CO2 SERPL-SCNC: 24 MMOL/L (ref 21–32)
COLOR FLUID: YELLOW
CREAT SERPL-MCNC: 0.5 MG/DL (ref 0.6–1.1)
DEPRECATED RDW RBC AUTO: 11.7 % (ref 12.4–15.4)
EOSINOPHIL # BLD: 0.1 K/UL (ref 0–0.6)
EOSINOPHIL NFR BLD: 0.9 %
EOSINOPHIL NFR FLD MANUAL: 1 %
GFR SERPLBLD CREATININE-BSD FMLA CKD-EPI: >90 ML/MIN/{1.73_M2}
GLUCOSE BLD-MCNC: 197 MG/DL (ref 70–99)
GLUCOSE BLD-MCNC: 221 MG/DL (ref 70–99)
GLUCOSE BLD-MCNC: 234 MG/DL (ref 70–99)
GLUCOSE BLD-MCNC: 261 MG/DL (ref 70–99)
GLUCOSE FLD-MCNC: 119 MG/DL
GLUCOSE SERPL-MCNC: 230 MG/DL (ref 70–99)
H CAPSUL AG UR IA-ACNC: NOT DETECTED NG/ML
H CAPSUL AG UR QL IA: NOT DETECTED
H CAPSUL MYC AB TITR SER CF: NORMAL {TITER}
H CAPSUL YST AB TITR SER CF: NORMAL {TITER}
HCT VFR BLD AUTO: 35.9 % (ref 36–48)
HGB BLD-MCNC: 12.4 G/DL (ref 12–16)
INR PPP: 1 (ref 0.85–1.15)
LDH FLD L TO P-CCNC: 526 U/L
LYMPHOCYTES # BLD: 0.8 K/UL (ref 1–5.1)
LYMPHOCYTES NFR BLD: 7.4 %
LYMPHOCYTES NFR FLD: 17 %
MCH RBC QN AUTO: 33 PG (ref 26–34)
MCHC RBC AUTO-ENTMCNC: 34.4 G/DL (ref 31–36)
MCV RBC AUTO: 95.9 FL (ref 80–100)
MONOCYTES # BLD: 1 K/UL (ref 0–1.3)
MONOCYTES NFR BLD: 8.4 %
NEUTROPHIL, FLUID: 82 %
NEUTROPHILS # BLD: 9.4 K/UL (ref 1.7–7.7)
NEUTROPHILS NFR BLD: 82.4 %
NUC CELL # FLD: 3408 /CUMM
PERFORMED ON: ABNORMAL
PH FLD STRIP: 8 [PH]
PLATELET # BLD AUTO: 326 K/UL (ref 135–450)
PMV BLD AUTO: 7.1 FL (ref 5–10.5)
POTASSIUM SERPL-SCNC: 3.9 MMOL/L (ref 3.5–5.1)
PROT FLD-MCNC: 4.9 G/DL
PROTHROMBIN TIME: 13.4 SEC (ref 11.9–14.9)
RBC # BLD AUTO: 3.75 M/UL (ref 4–5.2)
RBC FLUID: 2000 /CUMM
SODIUM SERPL-SCNC: 132 MMOL/L (ref 136–145)
SPECIMEN SOURCE FLD: NORMAL
SPECIMEN VOL FLD: 250 ML
TOTAL CELLS COUNTED FLD: 100
WBC # BLD AUTO: 11.4 K/UL (ref 4–11)

## 2025-03-12 PROCEDURE — 6370000000 HC RX 637 (ALT 250 FOR IP): Performed by: HOSPITALIST

## 2025-03-12 PROCEDURE — 99233 SBSQ HOSP IP/OBS HIGH 50: CPT | Performed by: INTERNAL MEDICINE

## 2025-03-12 PROCEDURE — 82945 GLUCOSE OTHER FLUID: CPT

## 2025-03-12 PROCEDURE — 87070 CULTURE OTHR SPECIMN AEROBIC: CPT

## 2025-03-12 PROCEDURE — 36415 COLL VENOUS BLD VENIPUNCTURE: CPT

## 2025-03-12 PROCEDURE — 85610 PROTHROMBIN TIME: CPT

## 2025-03-12 PROCEDURE — 6360000002 HC RX W HCPCS: Performed by: INTERNAL MEDICINE

## 2025-03-12 PROCEDURE — 85025 COMPLETE CBC W/AUTO DIFF WBC: CPT

## 2025-03-12 PROCEDURE — 88112 CYTOPATH CELL ENHANCE TECH: CPT

## 2025-03-12 PROCEDURE — 83930 ASSAY OF BLOOD OSMOLALITY: CPT

## 2025-03-12 PROCEDURE — 1200000000 HC SEMI PRIVATE

## 2025-03-12 PROCEDURE — 88185 FLOWCYTOMETRY/TC ADD-ON: CPT

## 2025-03-12 PROCEDURE — 32555 ASPIRATE PLEURA W/ IMAGING: CPT

## 2025-03-12 PROCEDURE — 88184 FLOWCYTOMETRY/ TC 1 MARKER: CPT

## 2025-03-12 PROCEDURE — 6370000000 HC RX 637 (ALT 250 FOR IP): Performed by: STUDENT IN AN ORGANIZED HEALTH CARE EDUCATION/TRAINING PROGRAM

## 2025-03-12 PROCEDURE — 71045 X-RAY EXAM CHEST 1 VIEW: CPT

## 2025-03-12 PROCEDURE — 6360000002 HC RX W HCPCS: Performed by: HOSPITALIST

## 2025-03-12 PROCEDURE — 94760 N-INVAS EAR/PLS OXIMETRY 1: CPT

## 2025-03-12 PROCEDURE — 89051 BODY FLUID CELL COUNT: CPT

## 2025-03-12 PROCEDURE — 83615 LACTATE (LD) (LDH) ENZYME: CPT

## 2025-03-12 PROCEDURE — 2580000003 HC RX 258: Performed by: INTERNAL MEDICINE

## 2025-03-12 PROCEDURE — 83986 ASSAY PH BODY FLUID NOS: CPT

## 2025-03-12 PROCEDURE — C1729 CATH, DRAINAGE: HCPCS

## 2025-03-12 PROCEDURE — 88305 TISSUE EXAM BY PATHOLOGIST: CPT

## 2025-03-12 PROCEDURE — 84157 ASSAY OF PROTEIN OTHER: CPT

## 2025-03-12 PROCEDURE — 82042 OTHER SOURCE ALBUMIN QUAN EA: CPT

## 2025-03-12 PROCEDURE — 87205 SMEAR GRAM STAIN: CPT

## 2025-03-12 PROCEDURE — 80048 BASIC METABOLIC PNL TOTAL CA: CPT

## 2025-03-12 RX ORDER — GLUCOSAMINE HCL/CHONDROITIN SU 500-400 MG
CAPSULE ORAL
Qty: 120 STRIP | Refills: 3 | Status: SHIPPED | OUTPATIENT
Start: 2025-03-12

## 2025-03-12 RX ORDER — AVOBENZONE, HOMOSALATE, OCTISALATE, OCTOCRYLENE 30; 40; 45; 26 MG/ML; MG/ML; MG/ML; MG/ML
1 CREAM TOPICAL
Qty: 120 EACH | Refills: 3 | Status: SHIPPED | OUTPATIENT
Start: 2025-03-12

## 2025-03-12 RX ORDER — INSULIN GLARGINE 100 [IU]/ML
22 INJECTION, SOLUTION SUBCUTANEOUS NIGHTLY
Status: DISCONTINUED | OUTPATIENT
Start: 2025-03-12 | End: 2025-03-13

## 2025-03-12 RX ADMIN — GUAIFENESIN 600 MG: 600 TABLET ORAL at 08:32

## 2025-03-12 RX ADMIN — INSULIN GLARGINE 22 UNITS: 100 INJECTION, SOLUTION SUBCUTANEOUS at 20:53

## 2025-03-12 RX ADMIN — METHOCARBAMOL 1000 MG: 500 TABLET ORAL at 08:32

## 2025-03-12 RX ADMIN — OXYCODONE HYDROCHLORIDE AND ACETAMINOPHEN 1 TABLET: 5; 325 TABLET ORAL at 16:12

## 2025-03-12 RX ADMIN — LINEZOLID 600 MG: 600 INJECTION, SOLUTION INTRAVENOUS at 07:34

## 2025-03-12 RX ADMIN — INSULIN LISPRO 2 UNITS: 100 INJECTION, SOLUTION INTRAVENOUS; SUBCUTANEOUS at 20:53

## 2025-03-12 RX ADMIN — INSULIN LISPRO 2 UNITS: 100 INJECTION, SOLUTION INTRAVENOUS; SUBCUTANEOUS at 17:35

## 2025-03-12 RX ADMIN — MUPIROCIN: 20 OINTMENT TOPICAL at 08:33

## 2025-03-12 RX ADMIN — SENNOSIDES AND DOCUSATE SODIUM 2 TABLET: 50; 8.6 TABLET ORAL at 08:32

## 2025-03-12 RX ADMIN — BENZONATATE 100 MG: 100 CAPSULE ORAL at 16:12

## 2025-03-12 RX ADMIN — ENOXAPARIN SODIUM 30 MG: 100 INJECTION SUBCUTANEOUS at 20:53

## 2025-03-12 RX ADMIN — MUPIROCIN: 20 OINTMENT TOPICAL at 20:52

## 2025-03-12 RX ADMIN — OXYCODONE HYDROCHLORIDE AND ACETAMINOPHEN 1 TABLET: 5; 325 TABLET ORAL at 04:05

## 2025-03-12 RX ADMIN — PIPERACILLIN AND TAZOBACTAM 4500 MG: 4; .5 INJECTION, POWDER, LYOPHILIZED, FOR SOLUTION INTRAVENOUS at 08:37

## 2025-03-12 RX ADMIN — ENOXAPARIN SODIUM 30 MG: 100 INJECTION SUBCUTANEOUS at 08:32

## 2025-03-12 RX ADMIN — PIPERACILLIN AND TAZOBACTAM 4500 MG: 4; .5 INJECTION, POWDER, LYOPHILIZED, FOR SOLUTION INTRAVENOUS at 01:55

## 2025-03-12 RX ADMIN — PIPERACILLIN AND TAZOBACTAM 4500 MG: 4; .5 INJECTION, POWDER, LYOPHILIZED, FOR SOLUTION INTRAVENOUS at 17:56

## 2025-03-12 RX ADMIN — METHOCARBAMOL 1000 MG: 500 TABLET ORAL at 12:57

## 2025-03-12 RX ADMIN — GUAIFENESIN 600 MG: 600 TABLET ORAL at 20:54

## 2025-03-12 RX ADMIN — INSULIN LISPRO 4 UNITS: 100 INJECTION, SOLUTION INTRAVENOUS; SUBCUTANEOUS at 08:32

## 2025-03-12 RX ADMIN — METHOCARBAMOL 1000 MG: 500 TABLET ORAL at 17:35

## 2025-03-12 RX ADMIN — OXYCODONE HYDROCHLORIDE AND ACETAMINOPHEN 1 TABLET: 5; 325 TABLET ORAL at 22:33

## 2025-03-12 RX ADMIN — METHOCARBAMOL 1000 MG: 500 TABLET ORAL at 20:53

## 2025-03-12 RX ADMIN — OXYCODONE HYDROCHLORIDE AND ACETAMINOPHEN 1 TABLET: 5; 325 TABLET ORAL at 10:07

## 2025-03-12 RX ADMIN — LINEZOLID 600 MG: 600 INJECTION, SOLUTION INTRAVENOUS at 18:43

## 2025-03-12 RX ADMIN — INSULIN LISPRO 2 UNITS: 100 INJECTION, SOLUTION INTRAVENOUS; SUBCUTANEOUS at 12:57

## 2025-03-12 ASSESSMENT — PAIN DESCRIPTION - ONSET
ONSET: ON-GOING
ONSET: ON-GOING

## 2025-03-12 ASSESSMENT — PAIN DESCRIPTION - DESCRIPTORS
DESCRIPTORS: SHARP;THROBBING
DESCRIPTORS: SHARP;THROBBING
DESCRIPTORS: SHARP
DESCRIPTORS: ACHING;STABBING

## 2025-03-12 ASSESSMENT — PAIN DESCRIPTION - LOCATION
LOCATION: ABDOMEN
LOCATION: FLANK
LOCATION: SHOULDER
LOCATION: FLANK
LOCATION: SHOULDER

## 2025-03-12 ASSESSMENT — PAIN - FUNCTIONAL ASSESSMENT
PAIN_FUNCTIONAL_ASSESSMENT: PREVENTS OR INTERFERES SOME ACTIVE ACTIVITIES AND ADLS
PAIN_FUNCTIONAL_ASSESSMENT: ACTIVITIES ARE NOT PREVENTED
PAIN_FUNCTIONAL_ASSESSMENT: PREVENTS OR INTERFERES SOME ACTIVE ACTIVITIES AND ADLS
PAIN_FUNCTIONAL_ASSESSMENT: ACTIVITIES ARE NOT PREVENTED

## 2025-03-12 ASSESSMENT — PAIN DESCRIPTION - PAIN TYPE
TYPE: ACUTE PAIN
TYPE: ACUTE PAIN

## 2025-03-12 ASSESSMENT — PAIN DESCRIPTION - FREQUENCY
FREQUENCY: CONTINUOUS
FREQUENCY: CONTINUOUS

## 2025-03-12 ASSESSMENT — PAIN SCALES - GENERAL
PAINLEVEL_OUTOF10: 7
PAINLEVEL_OUTOF10: 6
PAINLEVEL_OUTOF10: 4
PAINLEVEL_OUTOF10: 3
PAINLEVEL_OUTOF10: 4
PAINLEVEL_OUTOF10: 8
PAINLEVEL_OUTOF10: 4
PAINLEVEL_OUTOF10: 4
PAINLEVEL_OUTOF10: 9
PAINLEVEL_OUTOF10: 8

## 2025-03-12 ASSESSMENT — PAIN DESCRIPTION - ORIENTATION
ORIENTATION: LEFT

## 2025-03-12 NOTE — PROGRESS NOTES
[facility]  Diabetes Education   Progress Note       NAME:  Stacie Donato  MEDICAL RECORD NUMBER:  [id number]  AGE: 44 y.o.   GENDER: female  : 1980  TODAY'S DATE:  3/12/2025    Subjective   Reason for Diabetes Education Evaluation and Assessment: Elevated A1c    Visit Type: evaluation      Stacie Donato is a 44 y.o. female referred by:  [x] Physician     Chart reviewed. Per home medication list, pt was prescribed the following DM medications PTA: Metformin 500 mg take 1 tablet by mouth BID with meals.     Met with pt. Pt SOB during evaluation. Pt voiced she will be having a thoracentesis today. Pt currently is NPO. Pt voiced she has had minimal intake \"close to none\" per pt since . Pt voiced she lost her Medicaid coverage and missed open enrollment at her current employer. Pt agreeable to have [facility] Referral. Pt agreeable to be provided with Partnership information. Pt aware she will need to reach out on her own to check her eligibility for both resources. Pt lives with her significant other. Pt voiced her diet options are less healthy while she is working. Pt provided the following:    The following educational and support materials were provided:  My contact information  The Diabetes Education Program:  Planning Healthy Meals   [facility] [place] [facility]: My Healthy Plate  Academy of Nutrition and Dietetics handout - Carbohydrate Counting for People with Diabetes  [place]: Dial-A-Dietitian [phone] #[phone] (Monday through Friday 9:00 am-5:00 pm)  - Healthy Carbohydrates, Healthy snacking tips, if interested can request sample meal plans to help manage blood sugar, meal plans, grocery list, and tips for healthy eating can be sent upon request.   [facility] Brochure    PAST MEDICAL HISTORY        Diagnosis Date    Asthma     Fatty liver     Obesity     Psoriasis     Psoriasis     Type 2 diabetes mellitus (HCC)        PAST SURGICAL HISTORY    Past Surgical History:     BUN 8 03/12/2025 05:50 AM    CREATININE 0.5 03/12/2025 05:50 AM      Latest Reference Range & Units 03/12/25 05:50   Est, Glom Filt Rate >60  >90     FIB-4 Calculation: 0.86 at 3/12/2025  5:50 AM  Calculated from:  SGOT/AST: 41 U/L at 3/7/2025 11:24 AM  SGPT/ALT: 38 U/L at 3/7/2025 11:24 AM  Platelets: 326 K/uL at 3/12/2025  5:50 AM  Age: 44 years    Assessment        Diabetes Management and Education    Does the patient have a Primary Care Physician? No     Does the patient require new medication instruction? Yes, insulin pens    Person responsible for administration of Insulin/Medication:     [x] Self        Insulin Instruction:  insulin pen  Injection Site:   [x] location    [x] rotation     Level of patient/caregiver understanding:     [x] Verbalized Understanding      [x] Needs Reinforcement        Does the patient/caregiver monitor Blood Glucoses? No: needs glucometer    Does the patient/caregiver follow a Meal Plan? No: pt states at home she eats better unless she is at work she eats the foods that are available Reviewed importance of eating three meals per day and plate method for consistent carb intake.      Level of patient/caregiver understanding:     [x] Verbalized Understanding    [x] Needs Reinforcement         Does the patient/caregiver understand S/S of Hypoglycemia?  No: Reviewed symptoms, prevention and treatment.     Level of patient/caregiver understanding:    [x] Verbalized Understanding   [x] Needs Reinforcement                       Does the patient/caregiver understand S/S of Hyperglycemia?  No: Reviewed symptoms, prevention and treatment.    Level of patient/caregiver understanding:      [x] Verbalized Understanding    [x] Needs Reinforcement        Plan        Glucose Target: 140-180, avoid hypoglycemia    Total Daily Insulin:  3/12/2025: 4 units (as of 1254pm)  3/11/2025: 28 units    Recommendation(s): Increase insulin to weight based dosing (Basal and Prandial)  (Basal weight based

## 2025-03-12 NOTE — PROGRESS NOTES
03/05/2025.    HISTORY:  ORDERING SYSTEM PROVIDED HISTORY: Rule out septic emboli and chest x-ray  TECHNOLOGIST PROVIDED HISTORY:  Reason for exam:->Rule out septic emboli and chest x-ray  Additional Contrast?->1    FINDINGS:  Pulmonary Arteries: Mildly limited evaluation for pulmonary embolism due to  suboptimal contrast bolus enhancement and respiratory motion artifact.  No  large central pulmonary embolism identified.  Main pulmonary artery is mildly  dilated.    Mediastinum: No evidence of mediastinal lymphadenopathy.  The heart and  pericardium demonstrate no acute abnormality.  Question of mild cardiac left  ventricular hypertrophy.  There is no acute abnormality of the thoracic aorta.    Lungs/pleura: Multiple soft tissue nodules bilaterally with surrounding  ground-glass density, largest on the right within the upper lobe measuring  approximately 1 cm (series 7/image 73), and largest on the left within the  lower lobes measuring approximately 1.6 cm in diameter (series 7/image 47).  Largest left-sided lesion demonstrates intrinsic air concerning for  cavitation.  Findings may reflect septic emboli.  No pulmonary edema.  No  pneumothorax.  Trace left pleural fluid.    Upper Abdomen: Hepatic steatosis.  Hepatosplenomegaly.  Cholecystectomy.    Soft Tissues/Bones: No acute bone or soft tissue abnormality.    Impression  1.  Mildly limited evaluation of the pulmonary arterial system.  No central  pulmonary embolism identified.    2.  Multiple pulmonary nodules bilaterally with likely cavitation of the  largest left-sided lesion, suspicious for septic emboli.  Trace left pleural  fluid.  Imaging follow-up recommended to document resolution.    3.  Suggestion of cardiac left ventricular hypertrophy and mild pulmonary  arterial hypertension.  Follow-up echocardiogram may be useful as clinically  indicated.    4.  Hepatosplenomegaly.    5.  Additional findings, as above.      CXR PA/LAT: Results for orders placed  during the hospital encounter of 03/05/25    XR CHEST (2 VW)    Narrative  EXAMINATION:  TWO XRAY VIEWS OF THE CHEST    3/5/2025 11:16 am    COMPARISON:  06/20/2023    HISTORY:  ORDERING SYSTEM PROVIDED HISTORY: CP  TECHNOLOGIST PROVIDED HISTORY:  Reason for exam:->CP    FINDINGS:  The lungs are clear.  The cardiac silhouette is within normal limits.  There  is no pneumothorax or pleural effusion.    Impression  1.  No acute abnormality.      CXR portable: No results found for this or any previous visit.        Husam Sutherland MD, M.D.            3/12/2025, 12:32 PM

## 2025-03-12 NOTE — PROGRESS NOTES
Paient alert and oriented X4. Vital signs recorded. Patient states 9 out of 10 left abdomen/rib cage pain. Followed PRN protocol. See MAR. Patient blood sugar 213. Followed scheduled sliding scale insulin order. See MAR. Patient verbalizes understanding of education. All needed items within reach. Electronically signed by Ariadna Salinas RN on 3/11/2025 at 10:46 PM

## 2025-03-12 NOTE — PLAN OF CARE
Problem: Discharge Planning  Goal: Discharge to home or other facility with appropriate resources  3/11/2025 2321 by Ariadna Salinas RN  Outcome: Progressing  Flowsheets (Taken 3/11/2025 2135)  Discharge to home or other facility with appropriate resources:   Identify barriers to discharge with patient and caregiver   Arrange for needed discharge resources and transportation as appropriate     Problem: Pain  Goal: Verbalizes/displays adequate comfort level or baseline comfort level  3/11/2025 2321 by Ariadna Salinas RN  Outcome: Progressing     Problem: Respiratory - Adult  Goal: Achieves optimal ventilation and oxygenation  3/11/2025 2321 by Ariadna Salinas RN  Outcome: Progressing  Flowsheets (Taken 3/11/2025 2135)  Achieves optimal ventilation and oxygenation: Assess for changes in respiratory status     Problem: Skin/Tissue Integrity - Adult  Goal: Skin integrity remains intact  3/11/2025 2321 by Ariadna Salinas RN  Outcome: Progressing  Flowsheets (Taken 3/11/2025 2135)  Skin Integrity Remains Intact: Monitor for areas of redness and/or skin breakdown     Problem: Gastrointestinal - Adult  Goal: Minimal or absence of nausea and vomiting  3/11/2025 2321 by Ariadna Salinas RN  Outcome: Progressing  Flowsheets (Taken 3/11/2025 2135)  Minimal or absence of nausea and vomiting: Administer ordered antiemetic medications as needed     Problem: Genitourinary - Adult  Goal: Absence of urinary retention  3/11/2025 2321 by Ariadna Salinas RN  Outcome: Progressing  Flowsheets (Taken 3/11/2025 2135)  Absence of urinary retention: Monitor intake/output and perform bladder scan as needed     Problem: Infection - Adult  Goal: Absence of infection at discharge  3/11/2025 2321 by Ariadna Salinas RN  Outcome: Progressing  Flowsheets (Taken 3/11/2025 2135)  Absence of infection at discharge: Assess and monitor for signs and symptoms of infection     Problem: Metabolic/Fluid and Electrolytes - Adult  Goal: Electrolytes maintained  support, including active listening and acknowledgement of concerns of patient and caregivers  3. Reduce environmental stimuli, as able  4. Instruct patient/family in relaxation techniques, as appropriate  5. Assess for spiritual pain/suffering and initiate Spiritual Care, Psychosocial Clinical Specialist consults as needed  Outcome: Progressing  Flowsheets (Taken 3/11/2025 2135)  Patient/family able to verbalize anxieties, fears, and concerns, and demonstrate effective coping: Provide emotional support, including active listening and acknowledgement of concerns of patient and caregivers     Problem: Behavior  Goal: Pt/Family maintain appropriate behavior and adhere to behavioral management agreement, if implemented  Description: INTERVENTIONS:  1. Assess patient/family's coping skills and  non-compliant behavior (including use of illegal substances)  2. Notify security of behavior or suspected illegal substances which indicate the need for search of the family and/or belongings  3. Encourage verbalization of thoughts and concerns in a socially appropriate manner  4. Utilize positive, consistent limit setting strategies supporting safety of patient, staff and others  5. Encourage participation in the decision making process about the behavioral management agreement  6. If a visitor's behavior poses a threat to safety call refer to organization policy.  7. Initiate consult with , Psychosocial CNS, Spiritual Care as appropriate  Outcome: Progressing

## 2025-03-12 NOTE — PROGRESS NOTES
Pt resting in bed, pt has c/o 8/10 left flank pain, described as sharp, throbbing, administered scheduled Methocarbamol per MAR. Alert and oriented x4. VSS. Pt ambulating around room, up as tolerated. Pt has loss of appetite, pt did not eat breakfast. Pt denies any nausea or vomiting. Pt has moist productive cough. Bed/chair in lowest and locked position, bedside table within reach, call light within reach and pt encouraged to call for any needs or ambulation, side rails up x2, bed/chair alarm deferred. Pt denies any needs at this time.  Electronically signed by Ariadna Powers RN on 3/12/2025 at 8:33 AM

## 2025-03-12 NOTE — DISCHARGE INSTRUCTIONS
American Diabetes Association:     About Diabetes: https://diabetes.org/about-diabetes     Life with Diabetes: https://diabetes.org/living-with-diabetes     Health & Wellness: https://diabetes.org/health-wellness     Food & Nutrition: https://diabetes.org/food-nutrition     Tools & Resources: https://diabetes.org/tools-resources  _________________________________________________

## 2025-03-12 NOTE — PROGRESS NOTES
V2.0    [facility] [place] Progress Note      Name:  Stacie Donato /Age/Sex: 1980  (44 y.o. female)   MRN & CSN:  [id number] & [id number] Encounter Date/Time: 3/12/2025 8:35 AM EDT   Location:  [place] [address] PCP: No primary care provider on file.     Attending:David Dasilva DO       Hospital Day: 6  Brief Hospital Course  Stacie Donato is a 44 y.o. female with pertinent PMHx of T2DM who presented complaining of pleuritic chest pain.  She was admitted with left-sided pleural effusion.  Assessment & Plan     Bilateral pulmonary nodules with cavitation  Pleuritic chest pain  Left-sided pleural effusion  -Low suspicion for vasculitis as SHOAIB and cocci negative  -Fungitell positive, QuantiFERON gold negative  -TTE with no evidence of vegetation  -IV Zosyn and linezolid for empiric antibiotic coverage, patient does not have insurance therefore unlikely to be eligible for IV antibiotics at discharge  -Pneumonia panel and respiratory culture positive for strep agalactiae, MRSA nares positive  -Independent interpretation of chest x-ray from 3/11 showing left-sided pleural effusion  -Infectious diseases following  -Pulmonology following    Type 2 diabetes  -Lantus 18 units nightly  -Lispro 5 units 3 times daily with meals  -Medium dose sliding scale correctional insulin 4 times a day with meals and nightly  -Check point-of-care glucose 4 times daily with meals and nightly to monitor for hypoglycemia from insulin toxicity      Diet ADULT DIET; Regular; 4 carb choices (60 gm/meal)   DVT Prophylaxis [x] Lovenox, []  Heparin, [] SCDs, [] Ambulation,  [] Eliquis, [] Xarelto  [] Coumadin   Code Status Full Code   Disposition From: Home  Expected Disposition: Home  Estimated Date of Discharge: 3/14           Subjective:     Chief Complaint: Chest pain    Patient was seen and examined today lying in bed  Reports improvement in her pleuritic chest pain, doing okay currently, denies any shortness of breath, denies any cough though  03/05/2025 12:19 PM    LEUKOCYTESUR Negative 03/05/2025 12:19 PM    UROBILINOGEN 1.0 03/05/2025 12:19 PM    BILIRUBINUR Negative 03/05/2025 12:19 PM    BLOODU Negative 03/05/2025 12:19 PM    GLUCOSEU >=1000 03/05/2025 12:19 PM    GLUCOSEU Negative 03/05/2012 06:50 PM    KETUA TRACE 03/05/2025 12:19 PM     Urine Cultures: No results found for: \"LABURIN\"  Blood Cultures:   Lab Results   Component Value Date/Time    BC No Growth after 4 days of incubation. 03/07/2025 05:54 PM     Lab Results   Component Value Date/Time    BLOODCULT2 No Growth after 4 days of incubation. 03/07/2025 05:58 PM     Organism:   Lab Results   Component Value Date/Time    ORG Strep agalactiae (Beta Strep Group B) 03/10/2025 06:16 PM    ORG Streptococcus agalactiae (BHS Gr B) DNA Detected 03/10/2025 06:16 PM         Electronically signed by David Dasilva DO on 3/12/2025 at 8:07 AM

## 2025-03-12 NOTE — PROGRESS NOTES
Infectious Disease Follow up Notes  Admit Date: 3/7/2025  Hospital Day: 6    Antibiotics :  IV ZOSYN  IV Linezolid     CHIEF COMPLAINT:     Pleuritic pain  Left side pneumonia  Multiple cavitary nodules  Immunosuppressed  Diabetes  Morbid obesity  Chronic smoking  Night sweats  Left pleural effusion    Subjective interval History :  44 y.o.female  with history of plaque psoriasis managed by  Dermatology w topical steroid and risankizumab given q3 months, last in 11/2024  Last Quantiferon available for review was in 10/2023 was negative   She is now admitted to hospital secondary to left-sided pain associate with cough sweats and chills.  She also like a recent diabetes history of chronic smoking her main symptom is left-sided pleuritic pain with difficulty breathing CT chest indicating multiple cavitary lesions concern for infection    Complains of ongoing left-sided chest pain chest x-ray completed indicating small to moderate left pleural effusion with left lower space disease ultrasound indicated complex effusion status post ultrasound-guided thoracentesis to 250 cc of fluid removed    Past Medical History:    Past Medical History:   Diagnosis Date    Asthma     Fatty liver     Obesity     Psoriasis     Psoriasis     Type 2 diabetes mellitus (HCC)        Past Surgical History:    Past Surgical History:   Procedure Laterality Date    CHOLECYSTECTOMY         Current Medications:    Outpatient Medications Marked as Taking for the 3/7/25 encounter (Hospital Encounter)   Medication Sig Dispense Refill    blood glucose monitor kit and supplies Dispense sufficient amount for indicated testing frequency plus additional to accommodate PRN testing needs. Dispense all needed supplies to include: monitor, strips, lancing device, lancets, control solutions, alcohol swabs. 1 kit 0    Lancets MISC 1 each by Does not apply route 4 times daily  or  disease caused by other serogroups of  Legionella pneumophila.  Normal Range: Presumptive Negative   Narrative:     ORDER#: [id number]                          ORDERED BY: EDY CHAVARRIA  SOURCE: Urine Clean Catch                  COLLECTED:  03/08/25 12:19  ANTIBIOTICS AT JERROD.:                      RECEIVED :  03/08/25 12:41   Culture, Blood 1 [[id number]] Collected: 03/07/25 1754   Order Status: Completed Specimen: Blood Updated: 03/08/25 1915    Blood Culture, Routine No Growth to date.  Any change in status will be called.   Narrative:     ORDER#: [id number]                          ORDERED BY: ODALYS LEDBETTER  SOURCE: Blood                              COLLECTED:  03/07/25 17:54  ANTIBIOTICS AT JERROD.:                      RECEIVED :  03/07/25 18:14  If child <=2 yrs old please draw pediatric bottle.~Blood Culture 1   Culture, Blood 2 [[id number]] Collected: 03/07/25 1758   Order Status: Completed Specimen: Blood Updated: 03/08/25 1915    Culture, Blood 2 No Growth to date.  Any change in status will be called.   Narrative:     ORDER#: [id number]                          ORDERED BY: ODALYS LEDBETTER  SOURCE: Blood                              COLLECTED:  03/07/25 17:58  ANTIBIOTICS AT JERROD.:                      RECEIVED :  03/07/25 18:14  If child <=2 yrs old please draw pediatric bottle.~Blood Culture #2   MRSA DNA Probe, Nasal [[id number]] (Abnormal) Collected: 03/08/25 1219   Order Status: Completed Specimen: Nares Updated: 03/08/25 1814    Organism Staph aureus MRSA Abnormal     MRSA SCREEN RT-PCR -- Abnormal     POSITIVE for  Normal Range: Not detected  CONTACT PRECAUTIONS INDICATED   Abnormal    Narrative:     ORDER#: [id number]                          ORDERED BY: EDY CHAVARRIA  SOURCE: Nares                              COLLECTED:  03/08/25 12:19  ANTIBIOTICS AT JERROD.:                      RECEIVED :  03/08/25 12:38  CALL  Thomas  [facility] tel. [phone],  Microbiology results called to and read back

## 2025-03-12 NOTE — PLAN OF CARE
Problem: Discharge Planning  Goal: Discharge to home or other facility with appropriate resources  3/12/2025 1114 by Ariadna Santillan RN  Outcome: Progressing  3/11/2025 2321 by Ariadna Salinas RN  Outcome: Progressing  Flowsheets (Taken 3/11/2025 2135)  Discharge to home or other facility with appropriate resources:   Identify barriers to discharge with patient and caregiver   Arrange for needed discharge resources and transportation as appropriate     Problem: Pain  Goal: Verbalizes/displays adequate comfort level or baseline comfort level  3/12/2025 1114 by Ariadna Santillan RN  Outcome: Progressing  3/11/2025 2321 by Ariadna Salinas RN  Outcome: Progressing     Problem: Respiratory - Adult  Goal: Achieves optimal ventilation and oxygenation  3/12/2025 1114 by Ariadna Santillan RN  Outcome: Progressing  3/11/2025 2321 by Ariadna Salinas RN  Outcome: Progressing  Flowsheets (Taken 3/11/2025 2135)  Achieves optimal ventilation and oxygenation: Assess for changes in respiratory status     Problem: Skin/Tissue Integrity - Adult  Goal: Skin integrity remains intact  3/12/2025 1114 by Ariadna Santillan RN  Outcome: Progressing  3/11/2025 2321 by Ariadna Salinas RN  Outcome: Progressing  Flowsheets (Taken 3/11/2025 2135)  Skin Integrity Remains Intact: Monitor for areas of redness and/or skin breakdown     Problem: Gastrointestinal - Adult  Goal: Minimal or absence of nausea and vomiting  3/12/2025 1114 by Ariadna Santillan RN  Outcome: Progressing  3/11/2025 2321 by Ariadna Salinas RN  Outcome: Progressing  Flowsheets (Taken 3/11/2025 2135)  Minimal or absence of nausea and vomiting: Administer ordered antiemetic medications as needed     Problem: Genitourinary - Adult  Goal: Absence of urinary retention  3/12/2025 1114 by Ariadna Santillan RN  Outcome: Progressing  3/11/2025 2321 by Ariadna Salinas RN  Outcome: Progressing  Flowsheets (Taken 3/11/2025 2135)  Absence of urinary retention: Monitor  of the family and/or belongings  3. Encourage verbalization of thoughts and concerns in a socially appropriate manner  4. Utilize positive, consistent limit setting strategies supporting safety of patient, staff and others  5. Encourage participation in the decision making process about the behavioral management agreement  6. If a visitor's behavior poses a threat to safety call refer to organization policy.  7. Initiate consult with , Psychosocial CNS, Spiritual Care as appropriate  3/12/2025 1114 by Ariadna Santillan, RN  Outcome: Progressing  3/11/2025 2321 by Ariadna Salinas, RN  Outcome: Progressing

## 2025-03-13 ENCOUNTER — TELEPHONE (OUTPATIENT)
Dept: PRIMARY CARE CLINIC | Age: 45
End: 2025-03-13

## 2025-03-13 PROBLEM — J90 PLEURAL EFFUSION: Status: ACTIVE | Noted: 2025-03-13

## 2025-03-13 PROBLEM — A49.1 GROUP B STREPTOCOCCAL INFECTION: Status: ACTIVE | Noted: 2025-03-13

## 2025-03-13 LAB
ANION GAP SERPL CALCULATED.3IONS-SCNC: 13 MMOL/L (ref 3–16)
BACTERIA SPEC RESP CULT: ABNORMAL
BACTERIA SPEC RESP CULT: ABNORMAL
BASOPHILS # BLD: 0.1 K/UL (ref 0–0.2)
BASOPHILS NFR BLD: 1.2 %
BUN SERPL-MCNC: 7 MG/DL (ref 7–20)
CALCIUM SERPL-MCNC: 8.7 MG/DL (ref 8.3–10.6)
CHLORIDE SERPL-SCNC: 95 MMOL/L (ref 99–110)
CHLORIDE UR-SCNC: <20 MMOL/L
CO2 SERPL-SCNC: 24 MMOL/L (ref 21–32)
CREAT SERPL-MCNC: 0.5 MG/DL (ref 0.6–1.1)
CREAT UR-MCNC: 88.8 MG/DL (ref 28–259)
DEPRECATED RDW RBC AUTO: 11.8 % (ref 12.4–15.4)
EOSINOPHIL # BLD: 0.1 K/UL (ref 0–0.6)
EOSINOPHIL NFR BLD: 1.2 %
GFR SERPLBLD CREATININE-BSD FMLA CKD-EPI: >90 ML/MIN/{1.73_M2}
GLUCOSE BLD-MCNC: 195 MG/DL (ref 70–99)
GLUCOSE BLD-MCNC: 212 MG/DL (ref 70–99)
GLUCOSE BLD-MCNC: 250 MG/DL (ref 70–99)
GLUCOSE BLD-MCNC: 259 MG/DL (ref 70–99)
GLUCOSE SERPL-MCNC: 199 MG/DL (ref 70–99)
GRAM STN SPEC: ABNORMAL
H CAPSUL AG SER IA-ACNC: NOT DETECTED
H CAPSUL AG SER QL IA: NOT DETECTED
HCT VFR BLD AUTO: 36.4 % (ref 36–48)
HGB BLD-MCNC: 12.8 G/DL (ref 12–16)
LYMPHOCYTES # BLD: 1 K/UL (ref 1–5.1)
LYMPHOCYTES NFR BLD: 10.8 %
MAGNESIUM SERPL-MCNC: 2.01 MG/DL (ref 1.8–2.4)
MCH RBC QN AUTO: 33.6 PG (ref 26–34)
MCHC RBC AUTO-ENTMCNC: 35.2 G/DL (ref 31–36)
MCV RBC AUTO: 95.5 FL (ref 80–100)
MONOCYTES # BLD: 0.6 K/UL (ref 0–1.3)
MONOCYTES NFR BLD: 7.1 %
NEUTROPHILS # BLD: 7.2 K/UL (ref 1.7–7.7)
NEUTROPHILS NFR BLD: 79.7 %
ORGANISM: ABNORMAL
OSMOLALITY SERPL: 292 MOSM/KG (ref 275–295)
OSMOLALITY UR: 320 MOSM/KG (ref 390–1070)
PERFORMED ON: ABNORMAL
PLATELET # BLD AUTO: 391 K/UL (ref 135–450)
PMV BLD AUTO: 7.3 FL (ref 5–10.5)
POTASSIUM SERPL-SCNC: 3.3 MMOL/L (ref 3.5–5.1)
POTASSIUM UR-SCNC: 11.4 MMOL/L
RBC # BLD AUTO: 3.81 M/UL (ref 4–5.2)
REASON FOR REJECTION: NORMAL
REJECTED TEST: NORMAL
SODIUM SERPL-SCNC: 132 MMOL/L (ref 136–145)
SODIUM UR-SCNC: <20 MMOL/L
WBC # BLD AUTO: 9 K/UL (ref 4–11)

## 2025-03-13 PROCEDURE — 36415 COLL VENOUS BLD VENIPUNCTURE: CPT

## 2025-03-13 PROCEDURE — 6370000000 HC RX 637 (ALT 250 FOR IP): Performed by: STUDENT IN AN ORGANIZED HEALTH CARE EDUCATION/TRAINING PROGRAM

## 2025-03-13 PROCEDURE — 83935 ASSAY OF URINE OSMOLALITY: CPT

## 2025-03-13 PROCEDURE — 82436 ASSAY OF URINE CHLORIDE: CPT

## 2025-03-13 PROCEDURE — 6370000000 HC RX 637 (ALT 250 FOR IP): Performed by: HOSPITALIST

## 2025-03-13 PROCEDURE — 2500000003 HC RX 250 WO HCPCS: Performed by: HOSPITALIST

## 2025-03-13 PROCEDURE — 83735 ASSAY OF MAGNESIUM: CPT

## 2025-03-13 PROCEDURE — 84300 ASSAY OF URINE SODIUM: CPT

## 2025-03-13 PROCEDURE — 84133 ASSAY OF URINE POTASSIUM: CPT

## 2025-03-13 PROCEDURE — 6370000000 HC RX 637 (ALT 250 FOR IP): Performed by: INTERNAL MEDICINE

## 2025-03-13 PROCEDURE — 82570 ASSAY OF URINE CREATININE: CPT

## 2025-03-13 PROCEDURE — 94760 N-INVAS EAR/PLS OXIMETRY 1: CPT

## 2025-03-13 PROCEDURE — 85025 COMPLETE CBC W/AUTO DIFF WBC: CPT

## 2025-03-13 PROCEDURE — 6360000002 HC RX W HCPCS: Performed by: INTERNAL MEDICINE

## 2025-03-13 PROCEDURE — 99233 SBSQ HOSP IP/OBS HIGH 50: CPT | Performed by: INTERNAL MEDICINE

## 2025-03-13 PROCEDURE — 2580000003 HC RX 258: Performed by: INTERNAL MEDICINE

## 2025-03-13 PROCEDURE — 80048 BASIC METABOLIC PNL TOTAL CA: CPT

## 2025-03-13 PROCEDURE — 6360000002 HC RX W HCPCS: Performed by: HOSPITALIST

## 2025-03-13 PROCEDURE — 1200000000 HC SEMI PRIVATE

## 2025-03-13 RX ORDER — INSULIN GLARGINE 100 [IU]/ML
25 INJECTION, SOLUTION SUBCUTANEOUS NIGHTLY
Status: DISCONTINUED | OUTPATIENT
Start: 2025-03-13 | End: 2025-03-14

## 2025-03-13 RX ORDER — PREDNISONE 20 MG/1
20 TABLET ORAL DAILY
Status: COMPLETED | OUTPATIENT
Start: 2025-03-13 | End: 2025-03-17

## 2025-03-13 RX ORDER — SODIUM CHLORIDE 9 MG/ML
INJECTION, SOLUTION INTRAVENOUS CONTINUOUS
Status: DISCONTINUED | OUTPATIENT
Start: 2025-03-13 | End: 2025-03-14

## 2025-03-13 RX ADMIN — SENNOSIDES AND DOCUSATE SODIUM 2 TABLET: 50; 8.6 TABLET ORAL at 08:48

## 2025-03-13 RX ADMIN — GUAIFENESIN 600 MG: 600 TABLET ORAL at 08:48

## 2025-03-13 RX ADMIN — PREDNISONE 20 MG: 20 TABLET ORAL at 12:20

## 2025-03-13 RX ADMIN — METHOCARBAMOL 1000 MG: 500 TABLET ORAL at 20:25

## 2025-03-13 RX ADMIN — OXYCODONE HYDROCHLORIDE AND ACETAMINOPHEN 1 TABLET: 5; 325 TABLET ORAL at 06:42

## 2025-03-13 RX ADMIN — METHOCARBAMOL 1000 MG: 500 TABLET ORAL at 12:19

## 2025-03-13 RX ADMIN — METHOCARBAMOL 1000 MG: 500 TABLET ORAL at 08:48

## 2025-03-13 RX ADMIN — OXYCODONE HYDROCHLORIDE AND ACETAMINOPHEN 1 TABLET: 5; 325 TABLET ORAL at 19:20

## 2025-03-13 RX ADMIN — GUAIFENESIN 600 MG: 600 TABLET ORAL at 20:25

## 2025-03-13 RX ADMIN — INSULIN LISPRO 2 UNITS: 100 INJECTION, SOLUTION INTRAVENOUS; SUBCUTANEOUS at 17:08

## 2025-03-13 RX ADMIN — LINEZOLID 600 MG: 600 INJECTION, SOLUTION INTRAVENOUS at 06:39

## 2025-03-13 RX ADMIN — INSULIN LISPRO 5 UNITS: 100 INJECTION, SOLUTION INTRAVENOUS; SUBCUTANEOUS at 08:48

## 2025-03-13 RX ADMIN — INSULIN LISPRO 5 UNITS: 100 INJECTION, SOLUTION INTRAVENOUS; SUBCUTANEOUS at 17:08

## 2025-03-13 RX ADMIN — INSULIN LISPRO 5 UNITS: 100 INJECTION, SOLUTION INTRAVENOUS; SUBCUTANEOUS at 12:20

## 2025-03-13 RX ADMIN — MUPIROCIN: 20 OINTMENT TOPICAL at 20:26

## 2025-03-13 RX ADMIN — MUPIROCIN: 20 OINTMENT TOPICAL at 08:49

## 2025-03-13 RX ADMIN — INSULIN LISPRO 4 UNITS: 100 INJECTION, SOLUTION INTRAVENOUS; SUBCUTANEOUS at 08:49

## 2025-03-13 RX ADMIN — INSULIN GLARGINE 25 UNITS: 100 INJECTION, SOLUTION SUBCUTANEOUS at 20:24

## 2025-03-13 RX ADMIN — INSULIN LISPRO 4 UNITS: 100 INJECTION, SOLUTION INTRAVENOUS; SUBCUTANEOUS at 20:24

## 2025-03-13 RX ADMIN — ENOXAPARIN SODIUM 30 MG: 100 INJECTION SUBCUTANEOUS at 08:50

## 2025-03-13 RX ADMIN — ENOXAPARIN SODIUM 30 MG: 100 INJECTION SUBCUTANEOUS at 20:24

## 2025-03-13 RX ADMIN — OXYCODONE HYDROCHLORIDE AND ACETAMINOPHEN 1 TABLET: 5; 325 TABLET ORAL at 13:07

## 2025-03-13 RX ADMIN — LINEZOLID 600 MG: 600 INJECTION, SOLUTION INTRAVENOUS at 21:43

## 2025-03-13 RX ADMIN — PIPERACILLIN AND TAZOBACTAM 4500 MG: 4; .5 INJECTION, POWDER, LYOPHILIZED, FOR SOLUTION INTRAVENOUS at 17:18

## 2025-03-13 RX ADMIN — PIPERACILLIN AND TAZOBACTAM 4500 MG: 4; .5 INJECTION, POWDER, LYOPHILIZED, FOR SOLUTION INTRAVENOUS at 08:58

## 2025-03-13 RX ADMIN — SODIUM CHLORIDE, PRESERVATIVE FREE 10 ML: 5 INJECTION INTRAVENOUS at 20:26

## 2025-03-13 RX ADMIN — PIPERACILLIN AND TAZOBACTAM 4500 MG: 4; .5 INJECTION, POWDER, LYOPHILIZED, FOR SOLUTION INTRAVENOUS at 00:43

## 2025-03-13 RX ADMIN — METHOCARBAMOL 1000 MG: 500 TABLET ORAL at 17:04

## 2025-03-13 RX ADMIN — INSULIN LISPRO 2 UNITS: 100 INJECTION, SOLUTION INTRAVENOUS; SUBCUTANEOUS at 12:20

## 2025-03-13 RX ADMIN — POTASSIUM BICARBONATE 40 MEQ: 391 TABLET, EFFERVESCENT ORAL at 17:03

## 2025-03-13 ASSESSMENT — PAIN - FUNCTIONAL ASSESSMENT
PAIN_FUNCTIONAL_ASSESSMENT: ACTIVITIES ARE NOT PREVENTED
PAIN_FUNCTIONAL_ASSESSMENT: ACTIVITIES ARE NOT PREVENTED

## 2025-03-13 ASSESSMENT — PAIN SCALES - GENERAL
PAINLEVEL_OUTOF10: 7
PAINLEVEL_OUTOF10: 6
PAINLEVEL_OUTOF10: 10
PAINLEVEL_OUTOF10: 9
PAINLEVEL_OUTOF10: 5

## 2025-03-13 ASSESSMENT — PAIN DESCRIPTION - LOCATION
LOCATION: ABDOMEN
LOCATION: ABDOMEN

## 2025-03-13 ASSESSMENT — PAIN DESCRIPTION - ORIENTATION
ORIENTATION: LEFT;UPPER
ORIENTATION: LEFT

## 2025-03-13 ASSESSMENT — PAIN DESCRIPTION - DESCRIPTORS
DESCRIPTORS: ACHING
DESCRIPTORS: ACHING

## 2025-03-13 NOTE — PROGRESS NOTES
Patient alert and oriented x4. Vital signs taken and recorded. Patient's cbg result of 234. Humalog insulin sliding scale given as ordered. Patient complained of shoulder pain 8/10. PRN medication given per protocol. See mar.

## 2025-03-13 NOTE — PROGRESS NOTES
Pulmonary Progress Note     Patient's name:  Stacie Donato  Medical Record Number: [id number]  Patient's account/billing number: [id number]  Patient's YOB: 1980  Age: 44 y.o.  Date of Admission: 3/7/2025 10:17 AM  Date of Consult: 3/13/2025      Primary Care Physician: No primary care provider on file.      Code Status: Full Code    Reason for consult: bilateral lung nodules     Assessment and Plan     Bilateral lung nodules some with cavitation  Strep agalactiae with para pneumonic effusion s/p thoracentesis   Hyponatremia  Recent history of dental issues       Plan:  Antibiotics per ID  S/p thoracentesis, parapneumonic  Will give low dose prednisone for her pleuritic pain     Subjective  Still with left side pleuritic pain    HISTORY OF PRESENT ILLNESS:   MrPrince/Ms. Stacie Donato is a 44 y.o. lady with past medical history stated below significant for history of asthma, obesity, presented to the emergency department with left side pleuritic chest pain,  We were consulted for lung nodules and small effusion        Physical Exam:    Vitals: /81   Pulse 96   Temp 98.6 °F (37 °C) (Oral)   Resp 16   Ht 1.524 m (5')   Wt 113 kg (249 lb 1.9 oz)   SpO2 94%   BMI 48.65 kg/m²     Last Body weight:   Wt Readings from Last 3 Encounters:   03/12/25 113 kg (249 lb 1.9 oz)   03/05/25 114 kg (251 lb 5.2 oz)   07/04/24 119.7 kg (263 lb 14.3 oz)       Body Mass Index : Body mass index is 48.65 kg/m².      Intake and Output summary:   Intake/Output Summary (Last 24 hours) at 3/13/2025 1213  Last data filed at 3/13/2025 0830  Gross per 24 hour   Intake 360 ml   Output 1050 ml   Net -690 ml         Physical Examination:     PHYSICAL EXAM:    Gen: Mild acute distress  Eyes: PERRL. Anicteric sclera. No conjunctival injection.   ENT: No discharge. Posterior oropharynx clear. External appearance of ears and nose normal.  Neck: Trachea midline. No mass, no

## 2025-03-13 NOTE — PLAN OF CARE
Problem: Discharge Planning  Goal: Discharge to home or other facility with appropriate resources  Outcome: Progressing  Flowsheets (Taken 3/12/2025 2000)  Discharge to home or other facility with appropriate resources: Identify barriers to discharge with patient and caregiver     Problem: Pain  Goal: Verbalizes/displays adequate comfort level or baseline comfort level  Outcome: Progressing     Problem: Respiratory - Adult  Goal: Achieves optimal ventilation and oxygenation  Outcome: Progressing  Flowsheets (Taken 3/12/2025 2000)  Achieves optimal ventilation and oxygenation:   Assess for changes in respiratory status   Assess for changes in mentation and behavior   Position to facilitate oxygenation and minimize respiratory effort     Problem: Skin/Tissue Integrity - Adult  Goal: Skin integrity remains intact  Outcome: Progressing  Flowsheets (Taken 3/12/2025 2000)  Skin Integrity Remains Intact: Monitor for areas of redness and/or skin breakdown     Problem: Infection - Adult  Goal: Absence of infection at discharge  Outcome: Progressing  Flowsheets (Taken 3/12/2025 2000)  Absence of infection at discharge: Assess and monitor for signs and symptoms of infection     Problem: Metabolic/Fluid and Electrolytes - Adult  Goal: Electrolytes maintained within normal limits  Outcome: Progressing  Flowsheets (Taken 3/12/2025 2000)  Electrolytes maintained within normal limits: Monitor labs and assess patient for signs and symptoms of electrolyte imbalances     Problem: Hematologic - Adult  Goal: Maintains hematologic stability  Outcome: Progressing  Flowsheets (Taken 3/12/2025 2000)  Maintains hematologic stability: Assess for signs and symptoms of bleeding or hemorrhage     Problem: Cardiovascular - Adult  Goal: Maintains optimal cardiac output and hemodynamic stability  Outcome: Progressing  Flowsheets (Taken 3/12/2025 2000)  Maintains optimal cardiac output and hemodynamic stability: Monitor blood pressure and heart  INTERVENTIONS:  1. Assess patient/family's coping skills and  non-compliant behavior (including use of illegal substances)  2. Notify security of behavior or suspected illegal substances which indicate the need for search of the family and/or belongings  3. Encourage verbalization of thoughts and concerns in a socially appropriate manner  4. Utilize positive, consistent limit setting strategies supporting safety of patient, staff and others  5. Encourage participation in the decision making process about the behavioral management agreement  6. If a visitor's behavior poses a threat to safety call refer to organization policy.  7. Initiate consult with , Psychosocial CNS, Spiritual Care as appropriate  Outcome: Progressing  Flowsheets (Taken 3/12/2025 2000)  Patient/family maintains appropriate behavior and adheres to behavioral management agreement, if implemented:   Encourage verbalization of thoughts and concerns in a socially appropriate manner   Utilize positive, consistent limit setting strategies supporting safety of patient, staff and others

## 2025-03-13 NOTE — PROGRESS NOTES
V2.0    [facility] Progress Note      Name:  Stacie Donato /Age/Sex: 1980  (44 y.o. female)   MRN & CSN:  [id number] & [id number] Encounter Date/Time: 3/13/2025 8:35 AM EDT   Location:  [id number]-1130/4117-01 PCP: No primary care provider on file.     Attending:David Dasilva DO       Hospital Day: 7  Brief Hospital Course  Stacie Donato is a 44 y.o. female with pertinent PMHx of T2DM who presented complaining of pleuritic chest pain.  She was admitted with left-sided pleural effusion.  Assessment & Plan     Bilateral pulmonary nodules with cavitation  Pleuritic chest pain  Left-sided pleural effusion  -Fungitell positive, QuantiFERON gold negative  -Continue antibiotic coverage with IV Zosyn and linezolid  -Pneumonia panel and respiratory culture positive for strep agalactiae, MRSA nares positive  -S/p paracentesis 3/12 with 250 mL removed, appears to be exudative likely parapneumonic  -Infectious diseases following  -Pulmonology following    Type 2 diabetes  -Increase Lantus to 22 units units nightly  -Lispro 5 units 3 times daily with meals, has not been getting her prandial dosing as she has not been eating at least 50% of her meals  -Medium dose sliding scale correctional insulin 4 times a day with meals and nightly  -Check point-of-care glucose 4 times daily with meals and nightly to monitor for hypoglycemia from insulin toxicity    Hyponatremia  -Urine sodium low, urine osmolality high  -Fluid resuscitation with normal saline      Diet ADULT DIET; Regular; 4 carb choices (60 gm/meal)   DVT Prophylaxis [x] Lovenox, []  Heparin, [] SCDs, [] Ambulation,  [] Eliquis, [] Xarelto  [] Coumadin   Code Status Full Code   Disposition From: Home  Expected Disposition: Home  Estimated Date of Discharge: 3/14           Subjective:     Chief Complaint: Chest pain    Patient was seen and examined today lying in bed  Still says she feels bad, did get some relief in her left-sided pleuritic chest pain after  Iontophoresis (60542)    VASO (70644)     Ultrasound (28986)    Group Therapy (72935)     Estim Attended (09985)    Canalith Repositioning (35692)     Other:    Other: Total Timed Code Tx Minutes 31'        Total Treatment Minutes 45        Charge Justification:  (16100) THERAPEUTIC EXERCISE - Provided verbal/tactile cueing for activities related to strengthening, flexibility, endurance, ROM performed to prevent loss of range of motion, maintain or improve muscular strength or increase flexibility, following either an injury or surgery. (59919) [facility] - Reviewed/Progressed HEP activities related to strengthening, flexibility, endurance, ROM performed to prevent loss of range of motion, maintain or improve muscular strength or increase flexibility, following either an injury or surgery. (65515) NEUROMUSCULAR RE-EDUCATION - Therapeutic procedure, 1 or more areas, each 15 minutes; neuromuscular reeducation of movement, balance, coordination, kinesthetic sense, posture, and/or proprioception for sitting and/or standing activities  (79430) [facility] - Reviewed/Progressed HEP activities related to neuromuscular reeducation of movement, balance, coordination, kinesthetic sense, posture, and/or proprioception for sitting and/or standing activities    (37878) THERAPEUTIC ACTIVITY - use of dynamic activities to improve functional performance. (Ex include squatting, ascending/descending stairs, walking, bending, lifting, catching, throwing, pushing, pulling, jumping.)  Direct, one on one contact, billed in 15-minute increments.   (03222) MANUAL THERAPY -  Manual therapy techniques, 1 or more regions, each 15 minutes (Mobilization/manipulation, manual lymphatic drainage, manual traction) for the purpose of modulating pain, promoting relaxation,  increasing ROM, reducing/eliminating soft tissue swelling/inflammation/restriction, improving soft tissue extensibility and allowing for proper ROM for Clear 03/05/2025 12:19 PM    LEUKOCYTESUR Negative 03/05/2025 12:19 PM    UROBILINOGEN 1.0 03/05/2025 12:19 PM    BILIRUBINUR Negative 03/05/2025 12:19 PM    BLOODU Negative 03/05/2025 12:19 PM    GLUCOSEU >=1000 03/05/2025 12:19 PM    GLUCOSEU Negative 03/05/2012 06:50 PM    KETUA TRACE 03/05/2025 12:19 PM     Urine Cultures: No results found for: \"LABURIN\"  Blood Cultures:   Lab Results   Component Value Date/Time    BC No Growth after 4 days of incubation. 03/07/2025 05:54 PM     Lab Results   Component Value Date/Time    BLOODCULT2 No Growth after 4 days of incubation. 03/07/2025 05:58 PM     Organism:   Lab Results   Component Value Date/Time    ORG Strep agalactiae (Beta Strep Group B) 03/10/2025 06:16 PM    ORG Streptococcus agalactiae (BHS Gr B) DNA Detected 03/10/2025 06:16 PM         Electronically signed by David Dasilva DO on 3/13/2025 at 8:39 AM

## 2025-03-13 NOTE — PLAN OF CARE
Problem: Discharge Planning  Goal: Discharge to home or other facility with appropriate resources  Outcome: Progressing     Problem: Pain  Goal: Verbalizes/displays adequate comfort level or baseline comfort level  Outcome: Progressing     Problem: Respiratory - Adult  Goal: Achieves optimal ventilation and oxygenation  Outcome: Progressing     Problem: Skin/Tissue Integrity - Adult  Goal: Skin integrity remains intact  Outcome: Progressing     Problem: Gastrointestinal - Adult  Goal: Minimal or absence of nausea and vomiting  Outcome: Progressing     Problem: Genitourinary - Adult  Goal: Absence of urinary retention  Outcome: Progressing     Problem: Infection - Adult  Goal: Absence of infection at discharge  Outcome: Progressing     Problem: Metabolic/Fluid and Electrolytes - Adult  Goal: Electrolytes maintained within normal limits  Outcome: Progressing     Problem: Hematologic - Adult  Goal: Maintains hematologic stability  Outcome: Progressing     Problem: Cardiovascular - Adult  Goal: Maintains optimal cardiac output and hemodynamic stability  Outcome: Progressing     Problem: ABCDS Injury Assessment  Goal: Absence of physical injury  Outcome: Progressing     Problem: Chronic Conditions and Co-morbidities  Goal: Patient's chronic conditions and co-morbidity symptoms are monitored and maintained or improved  Outcome: Progressing     Problem: Anxiety  Goal: Will report anxiety at manageable levels  Description: INTERVENTIONS:  1. Administer medication as ordered  2. Teach and rehearse alternative coping skills  3. Provide emotional support with 1:1 interaction with staff  Outcome: Progressing     Problem: Coping  Goal: Pt/Family able to verbalize concerns and demonstrate effective coping strategies  Description: INTERVENTIONS:  1. Assist patient/family to identify coping skills, available support systems and cultural and spiritual values  2. Provide emotional support, including active listening and  acknowledgement of concerns of patient and caregivers  3. Reduce environmental stimuli, as able  4. Instruct patient/family in relaxation techniques, as appropriate  5. Assess for spiritual pain/suffering and initiate Spiritual Care, Psychosocial Clinical Specialist consults as needed  Outcome: Progressing     Problem: Behavior  Goal: Pt/Family maintain appropriate behavior and adhere to behavioral management agreement, if implemented  Description: INTERVENTIONS:  1. Assess patient/family's coping skills and  non-compliant behavior (including use of illegal substances)  2. Notify security of behavior or suspected illegal substances which indicate the need for search of the family and/or belongings  3. Encourage verbalization of thoughts and concerns in a socially appropriate manner  4. Utilize positive, consistent limit setting strategies supporting safety of patient, staff and others  5. Encourage participation in the decision making process about the behavioral management agreement  6. If a visitor's behavior poses a threat to safety call refer to organization policy.  7. Initiate consult with , Psychosocial CNS, Spiritual Care as appropriate  Outcome: Progressing     Problem: Safety - Adult  Goal: Free from fall injury  Outcome: Progressing

## 2025-03-13 NOTE — PROGRESS NOTES
Infectious Disease Follow up Notes  Admit Date: 3/7/2025  Hospital Day: 7    Antibiotics :  IV ZOSYN  IV Linezolid     CHIEF COMPLAINT:     Pleuritic pain  Left side pneumonia  Multiple cavitary nodules  Immunosuppressed  Diabetes  Morbid obesity  Chronic smoking  Night sweats  Left pleural effusion    Subjective interval History :  44 y.o.female  with history of plaque psoriasis managed by  Dermatology w topical steroid and risankizumab given q3 months, last in 11/2024  Last Quantiferon available for review was in 10/2023 was negative   She is now admitted to hospital secondary to left-sided pain associate with cough sweats and chills.  She also like a recent diabetes history of chronic smoking her main symptom is left-sided pleuritic pain with difficulty breathing CT chest indicating multiple cavitary lesions concern for infection    Complains of ongoing left-sided chest pain status post thoracentesis breathing slowly improving pleural fluid culture in process appears to be parapneumonic effusion     past Medical History:    Past Medical History:   Diagnosis Date    Asthma     Fatty liver     Obesity     Psoriasis     Psoriasis     Type 2 diabetes mellitus (HCC)        Past Surgical History:    Past Surgical History:   Procedure Laterality Date    CHOLECYSTECTOMY         Current Medications:    Outpatient Medications Marked as Taking for the 3/7/25 encounter (Hospital Encounter)   Medication Sig Dispense Refill    blood glucose monitor kit and supplies Dispense sufficient amount for indicated testing frequency plus additional to accommodate PRN testing needs. Dispense all needed supplies to include: monitor, strips, lancing device, lancets, control solutions, alcohol swabs. 1 kit 0    Lancets MISC 1 each by Does not apply route 4 times daily (before meals and nightly) 120 each 3    blood glucose monitor strips Test 3 times a day & as  Abnormal     MRSA SCREEN RT-PCR -- Abnormal     POSITIVE for  Normal Range: Not detected  CONTACT PRECAUTIONS INDICATED   Abnormal    Narrative:     ORDER#: [id number]                          ORDERED BY: EDY CHAVARRIA  SOURCE: Nares                              COLLECTED:  03/08/25 12:19  ANTIBIOTICS AT JERROD.:                      RECEIVED :  03/08/25 12:38  CALL  Thomas  [facility] tel. [phone],  Microbiology results called to and read back by ANGELICA Quiñonez, 03/08/2025  18:14, by [facility]   Pneumonia Panel, Molecular [[id number]] Collected: 03/07/25 0000   Order Status: Canceled Specimen: Sputum Expectorated        Blood Culture: imeQuantiferon TB Gold [[id number]]Collected: 03/09/25 1636Order Status: SentSpecimen: BloodUpdated: 03/09/25 1643Strep Pneumoniae Antigen [[id number]]Collected: 03/08/25 1219Order Status: CompletedSpecimen: Urine, clean catchUpdated: 03/09/25 1004STREP PNEUMONIAE ANTIGEN, URINE--Presumptive Negative  Presumptive negative suggests no current or recent  pneumococcal infection. Infection due to Strep pneumoniae  cannot be ruled out since the antigen present in the sample  may be below the detection limit of the test.  Normal Range:Presumptive Negative  Narrative:  ORDER#: [id number]                          ORDERED BY: EDY CHAVARRIA  SOURCE: Urine Clean Catch                  COLLECTED:  03/08/25 12:19  ANTIBIOTICS AT JERROD.:                      RECEIVED :  03/08/25 12:41Legionella antigen, urine [[id number]]Collected: 03/08/25 1219Order Status: CompletedSpecimen: Urine, clean catchUpdated: 03/09/25 1004L. pneumophila Serogp 1 Ur Ag--Presumptive Negative  No Legionella pneumophila serogroup 1 antigens detected.  A negative result does not exclude infection with  Legionella pneumophila serogroup 1 nor does it rule out  other microbial-caused respiratory infections or  disease caused by other serogroups of  Legionella pneumophila.  Normal Range: Presumptive Negative  Narrative:  ORDER#:  [id number]                          ORDERED BY: EDY CHAVARRIA  SOURCE: Urine Clean Catch                  COLLECTED:  03/08/25 12:19  ANTIBIOTICS AT JERROD.:                      RECEIVED :  03/08/25 12:41Culture, Blood 1 [[id number]]Collected: 03/07/25 1754Order Status: CompletedSpecimen: BloodUpdated: 03/08/25 1915Blood Culture, RoutineNo Growth to date.  Any change in status will be called.Narrative:  ORDER#: [id number]                          ORDERED BY: ODALYS LEDBETTER  SOURCE: Blood                              COLLECTED:  03/07/25 17:54  ANTIBIOTICS AT JERROD.:                      RECEIVED :  03/07/25 18:14  If child <=2 yrs old please draw pediatric bottle.~Blood Culture 1Culture, Blood 2 [[id number]]Collected: 03/07/25 1758Order Status: CompletedSpecimen: BloodUpdated: 03/08/25 1915Culture, Blood 2No Growth to date.  Any change in status will be called.Narrative:  ORDER#: [id number]                          ORDERED BY: ODALYS LEDBETTER  SOURCE: Blood                              COLLECTED:  03/07/25 17:58  ANTIBIOTICS AT JERROD.:                      RECEIVED :  03/07/25 18:14  If child <=2 yrs old please draw pediatric bottle.~Blood Culture #2MRSA DNA Probe, Nasal [[id number]] (Abnormal)Collected: 03/08/25 1219Order Status: CompletedSpecimen: NaresUpdated: 03/08/25 1814OrganismStaph aureus MRSA Abnormal MRSA SCREEN RT-PCR-- Abnormal POSITIVE for  Normal Range: Not detected  CONTACT PRECAUTIONS INDICATED   Abnormal Narrative:  ORDER#: [id number]                          ORDERED BY: EDY CHAVARRIA  SOURCE: Nares                              COLLECTED:  03/08/25 12:19  ANTIBIOTICS AT JERROD.:                      RECEIVED :  03/08/25 12:38  [facility]  [facility] [phone],  Microbiology results called to and read back by ANGELICA Quiñonez, 03/08/2025  18:14, by [facility] Panel, Molecular [[id number]]Order Status: No resultSpecimen: Sputum Expectorated   Lab Results   Component Value Date/Time    BC No Growth

## 2025-03-14 LAB
ANION GAP SERPL CALCULATED.3IONS-SCNC: 10 MMOL/L (ref 3–16)
ASPERGILLUS AB SER QL ID: NOT DETECTED
B DERMAT AB SER QL ID: NOT DETECTED
BASOPHILS # BLD: 0.2 K/UL (ref 0–0.2)
BASOPHILS NFR BLD: 1.5 %
BUN SERPL-MCNC: 6 MG/DL (ref 7–20)
CALCIUM SERPL-MCNC: 9.1 MG/DL (ref 8.3–10.6)
CHLORIDE SERPL-SCNC: 96 MMOL/L (ref 99–110)
CO2 SERPL-SCNC: 26 MMOL/L (ref 21–32)
COCCIDIOIDES AB SPEC QL ID: NOT DETECTED
CREAT SERPL-MCNC: 0.5 MG/DL (ref 0.6–1.1)
DEPRECATED RDW RBC AUTO: 11.7 % (ref 12.4–15.4)
EOSINOPHIL # BLD: 0.1 K/UL (ref 0–0.6)
EOSINOPHIL NFR BLD: 0.6 %
GFR SERPLBLD CREATININE-BSD FMLA CKD-EPI: >90 ML/MIN/{1.73_M2}
GLUCOSE BLD-MCNC: 165 MG/DL (ref 70–99)
GLUCOSE BLD-MCNC: 250 MG/DL (ref 70–99)
GLUCOSE BLD-MCNC: 265 MG/DL (ref 70–99)
GLUCOSE BLD-MCNC: 288 MG/DL (ref 70–99)
GLUCOSE SERPL-MCNC: 172 MG/DL (ref 70–99)
H CAPSUL AB TITR SER ID: NOT DETECTED {TITER}
HCT VFR BLD AUTO: 39 % (ref 36–48)
HGB BLD-MCNC: 13.4 G/DL (ref 12–16)
LYMPHOCYTES # BLD: 1.5 K/UL (ref 1–5.1)
LYMPHOCYTES NFR BLD: 13.1 %
MAGNESIUM SERPL-MCNC: 2.13 MG/DL (ref 1.8–2.4)
MCH RBC QN AUTO: 32.6 PG (ref 26–34)
MCHC RBC AUTO-ENTMCNC: 34.4 G/DL (ref 31–36)
MCV RBC AUTO: 94.7 FL (ref 80–100)
MISCELLANEOUS LAB TEST ORDER: NORMAL
MONOCYTES # BLD: 0.7 K/UL (ref 0–1.3)
MONOCYTES NFR BLD: 6.6 %
NEUTROPHILS # BLD: 8.9 K/UL (ref 1.7–7.7)
NEUTROPHILS NFR BLD: 78.2 %
PERFORMED ON: ABNORMAL
PLATELET # BLD AUTO: 453 K/UL (ref 135–450)
PMV BLD AUTO: 7.2 FL (ref 5–10.5)
POTASSIUM SERPL-SCNC: 3.4 MMOL/L (ref 3.5–5.1)
RBC # BLD AUTO: 4.12 M/UL (ref 4–5.2)
SODIUM SERPL-SCNC: 132 MMOL/L (ref 136–145)
WBC # BLD AUTO: 11.3 K/UL (ref 4–11)

## 2025-03-14 PROCEDURE — 6370000000 HC RX 637 (ALT 250 FOR IP): Performed by: STUDENT IN AN ORGANIZED HEALTH CARE EDUCATION/TRAINING PROGRAM

## 2025-03-14 PROCEDURE — 6360000002 HC RX W HCPCS: Performed by: INTERNAL MEDICINE

## 2025-03-14 PROCEDURE — 2580000003 HC RX 258: Performed by: STUDENT IN AN ORGANIZED HEALTH CARE EDUCATION/TRAINING PROGRAM

## 2025-03-14 PROCEDURE — 2500000003 HC RX 250 WO HCPCS: Performed by: HOSPITALIST

## 2025-03-14 PROCEDURE — 05HB33Z INSERTION OF INFUSION DEVICE INTO RIGHT BASILIC VEIN, PERCUTANEOUS APPROACH: ICD-10-PCS | Performed by: STUDENT IN AN ORGANIZED HEALTH CARE EDUCATION/TRAINING PROGRAM

## 2025-03-14 PROCEDURE — 85025 COMPLETE CBC W/AUTO DIFF WBC: CPT

## 2025-03-14 PROCEDURE — 6370000000 HC RX 637 (ALT 250 FOR IP): Performed by: HOSPITALIST

## 2025-03-14 PROCEDURE — 83735 ASSAY OF MAGNESIUM: CPT

## 2025-03-14 PROCEDURE — 99233 SBSQ HOSP IP/OBS HIGH 50: CPT | Performed by: INTERNAL MEDICINE

## 2025-03-14 PROCEDURE — 36415 COLL VENOUS BLD VENIPUNCTURE: CPT

## 2025-03-14 PROCEDURE — 80048 BASIC METABOLIC PNL TOTAL CA: CPT

## 2025-03-14 PROCEDURE — 6360000002 HC RX W HCPCS: Performed by: HOSPITALIST

## 2025-03-14 PROCEDURE — 1200000000 HC SEMI PRIVATE

## 2025-03-14 PROCEDURE — 2580000003 HC RX 258: Performed by: INTERNAL MEDICINE

## 2025-03-14 PROCEDURE — 6370000000 HC RX 637 (ALT 250 FOR IP): Performed by: INTERNAL MEDICINE

## 2025-03-14 RX ORDER — SODIUM CHLORIDE 9 MG/ML
INJECTION, SOLUTION INTRAVENOUS PRN
Status: DISCONTINUED | OUTPATIENT
Start: 2025-03-14 | End: 2025-03-18 | Stop reason: HOSPADM

## 2025-03-14 RX ORDER — LIDOCAINE HYDROCHLORIDE 10 MG/ML
50 INJECTION, SOLUTION EPIDURAL; INFILTRATION; INTRACAUDAL; PERINEURAL ONCE
Status: DISCONTINUED | OUTPATIENT
Start: 2025-03-14 | End: 2025-03-18 | Stop reason: HOSPADM

## 2025-03-14 RX ORDER — INSULIN LISPRO 100 [IU]/ML
0.08 INJECTION, SOLUTION INTRAVENOUS; SUBCUTANEOUS
Status: DISCONTINUED | OUTPATIENT
Start: 2025-03-14 | End: 2025-03-17

## 2025-03-14 RX ORDER — SODIUM CHLORIDE 0.9 % (FLUSH) 0.9 %
5-40 SYRINGE (ML) INJECTION EVERY 12 HOURS SCHEDULED
Status: DISCONTINUED | OUTPATIENT
Start: 2025-03-14 | End: 2025-03-18 | Stop reason: HOSPADM

## 2025-03-14 RX ORDER — INSULIN LISPRO 100 [IU]/ML
8 INJECTION, SOLUTION INTRAVENOUS; SUBCUTANEOUS
Status: DISCONTINUED | OUTPATIENT
Start: 2025-03-14 | End: 2025-03-14

## 2025-03-14 RX ORDER — SODIUM CHLORIDE 9 MG/ML
INJECTION, SOLUTION INTRAVENOUS CONTINUOUS
Status: DISCONTINUED | OUTPATIENT
Start: 2025-03-14 | End: 2025-03-15

## 2025-03-14 RX ORDER — SODIUM CHLORIDE 0.9 % (FLUSH) 0.9 %
5-40 SYRINGE (ML) INJECTION PRN
Status: DISCONTINUED | OUTPATIENT
Start: 2025-03-14 | End: 2025-03-18 | Stop reason: HOSPADM

## 2025-03-14 RX ORDER — INSULIN GLARGINE 100 [IU]/ML
0.25 INJECTION, SOLUTION SUBCUTANEOUS DAILY
Status: DISCONTINUED | OUTPATIENT
Start: 2025-03-14 | End: 2025-03-14

## 2025-03-14 RX ORDER — LINEZOLID 600 MG/1
600 TABLET, FILM COATED ORAL EVERY 12 HOURS SCHEDULED
Status: DISCONTINUED | OUTPATIENT
Start: 2025-03-15 | End: 2025-03-18 | Stop reason: HOSPADM

## 2025-03-14 RX ORDER — INSULIN GLARGINE 100 [IU]/ML
0.25 INJECTION, SOLUTION SUBCUTANEOUS NIGHTLY
Status: DISCONTINUED | OUTPATIENT
Start: 2025-03-14 | End: 2025-03-18 | Stop reason: HOSPADM

## 2025-03-14 RX ADMIN — GUAIFENESIN 600 MG: 600 TABLET ORAL at 20:34

## 2025-03-14 RX ADMIN — PIPERACILLIN AND TAZOBACTAM 4500 MG: 4; .5 INJECTION, POWDER, LYOPHILIZED, FOR SOLUTION INTRAVENOUS at 09:56

## 2025-03-14 RX ADMIN — INSULIN LISPRO 9 UNITS: 100 INJECTION, SOLUTION INTRAVENOUS; SUBCUTANEOUS at 17:49

## 2025-03-14 RX ADMIN — POTASSIUM CHLORIDE 40 MEQ: 1500 TABLET, EXTENDED RELEASE ORAL at 07:18

## 2025-03-14 RX ADMIN — INSULIN LISPRO 4 UNITS: 100 INJECTION, SOLUTION INTRAVENOUS; SUBCUTANEOUS at 17:49

## 2025-03-14 RX ADMIN — ENOXAPARIN SODIUM 30 MG: 100 INJECTION SUBCUTANEOUS at 09:34

## 2025-03-14 RX ADMIN — METHOCARBAMOL 1000 MG: 500 TABLET ORAL at 20:34

## 2025-03-14 RX ADMIN — SODIUM CHLORIDE, PRESERVATIVE FREE 10 ML: 5 INJECTION INTRAVENOUS at 09:40

## 2025-03-14 RX ADMIN — PIPERACILLIN AND TAZOBACTAM 4500 MG: 4; .5 INJECTION, POWDER, LYOPHILIZED, FOR SOLUTION INTRAVENOUS at 01:17

## 2025-03-14 RX ADMIN — SODIUM CHLORIDE, PRESERVATIVE FREE 10 ML: 5 INJECTION INTRAVENOUS at 20:33

## 2025-03-14 RX ADMIN — LINEZOLID 600 MG: 600 INJECTION, SOLUTION INTRAVENOUS at 09:46

## 2025-03-14 RX ADMIN — PREDNISONE 20 MG: 20 TABLET ORAL at 09:34

## 2025-03-14 RX ADMIN — INSULIN LISPRO 5 UNITS: 100 INJECTION, SOLUTION INTRAVENOUS; SUBCUTANEOUS at 09:35

## 2025-03-14 RX ADMIN — OXYCODONE HYDROCHLORIDE AND ACETAMINOPHEN 1 TABLET: 5; 325 TABLET ORAL at 09:32

## 2025-03-14 RX ADMIN — OXYCODONE HYDROCHLORIDE AND ACETAMINOPHEN 1 TABLET: 5; 325 TABLET ORAL at 20:33

## 2025-03-14 RX ADMIN — INSULIN GLARGINE 28 UNITS: 100 INJECTION, SOLUTION SUBCUTANEOUS at 20:34

## 2025-03-14 RX ADMIN — SODIUM CHLORIDE: 0.9 INJECTION, SOLUTION INTRAVENOUS at 09:54

## 2025-03-14 RX ADMIN — METHOCARBAMOL 1000 MG: 500 TABLET ORAL at 09:34

## 2025-03-14 RX ADMIN — LINEZOLID 600 MG: 600 INJECTION, SOLUTION INTRAVENOUS at 20:40

## 2025-03-14 RX ADMIN — METHOCARBAMOL 1000 MG: 500 TABLET ORAL at 12:09

## 2025-03-14 RX ADMIN — METHOCARBAMOL 1000 MG: 500 TABLET ORAL at 18:27

## 2025-03-14 RX ADMIN — INSULIN LISPRO 4 UNITS: 100 INJECTION, SOLUTION INTRAVENOUS; SUBCUTANEOUS at 20:34

## 2025-03-14 RX ADMIN — ENOXAPARIN SODIUM 30 MG: 100 INJECTION SUBCUTANEOUS at 20:33

## 2025-03-14 RX ADMIN — MUPIROCIN: 20 OINTMENT TOPICAL at 09:38

## 2025-03-14 RX ADMIN — GUAIFENESIN 600 MG: 600 TABLET ORAL at 09:34

## 2025-03-14 RX ADMIN — INSULIN LISPRO 4 UNITS: 100 INJECTION, SOLUTION INTRAVENOUS; SUBCUTANEOUS at 12:10

## 2025-03-14 RX ADMIN — INSULIN LISPRO 5 UNITS: 100 INJECTION, SOLUTION INTRAVENOUS; SUBCUTANEOUS at 12:10

## 2025-03-14 ASSESSMENT — PAIN SCALES - GENERAL
PAINLEVEL_OUTOF10: 9
PAINLEVEL_OUTOF10: 7
PAINLEVEL_OUTOF10: 7
PAINLEVEL_OUTOF10: 4

## 2025-03-14 ASSESSMENT — PAIN SCALES - WONG BAKER: WONGBAKER_NUMERICALRESPONSE: NO HURT

## 2025-03-14 ASSESSMENT — PAIN DESCRIPTION - LOCATION
LOCATION: ABDOMEN;RIB CAGE
LOCATION: RIB CAGE

## 2025-03-14 ASSESSMENT — PAIN DESCRIPTION - ORIENTATION
ORIENTATION: LEFT
ORIENTATION: LEFT

## 2025-03-14 ASSESSMENT — PAIN DESCRIPTION - DESCRIPTORS: DESCRIPTORS: ACHING;THROBBING

## 2025-03-14 NOTE — PLAN OF CARE
Problem: Discharge Planning  Goal: Discharge to home or other facility with appropriate resources  3/14/2025 1226 by Thu Kaye RN  Outcome: Progressing  3/13/2025 2346 by Katherine Kim RN  Outcome: Progressing     Problem: Pain  Goal: Verbalizes/displays adequate comfort level or baseline comfort level  3/14/2025 1226 by Thu Kaye RN  Outcome: Progressing  3/13/2025 2346 by Katherine Kim RN  Outcome: Progressing     Problem: Respiratory - Adult  Goal: Achieves optimal ventilation and oxygenation  3/14/2025 1226 by Thu Kaye RN  Outcome: Progressing  3/13/2025 2346 by Katherine Kim RN  Outcome: Progressing     Problem: Skin/Tissue Integrity - Adult  Goal: Skin integrity remains intact  3/14/2025 1226 by Thu Kaye RN  Outcome: Progressing  3/13/2025 2346 by Katherine Kim RN  Outcome: Progressing     Problem: Gastrointestinal - Adult  Goal: Minimal or absence of nausea and vomiting  3/14/2025 1226 by Thu Kaye RN  Outcome: Progressing  3/13/2025 2346 by Katherine Kim RN  Outcome: Progressing     Problem: Genitourinary - Adult  Goal: Absence of urinary retention  3/14/2025 1226 by Thu Kaey RN  Outcome: Progressing  3/13/2025 2346 by Katherine Kim RN  Outcome: Progressing     Problem: Infection - Adult  Goal: Absence of infection at discharge  3/14/2025 1226 by Thu Kaye RN  Outcome: Progressing  3/13/2025 2346 by Katherine Kim RN  Outcome: Progressing     Problem: Metabolic/Fluid and Electrolytes - Adult  Goal: Electrolytes maintained within normal limits  3/14/2025 1226 by Thu Kaye RN  Outcome: Progressing  3/13/2025 2346 by Katherine Kim RN  Outcome: Progressing     Problem: Hematologic - Adult  Goal: Maintains hematologic stability  3/14/2025 1226 by Thu Kaye RN  Outcome: Progressing  3/13/2025 2346 by Katherine Kim RN  Outcome: Progressing     Problem: Cardiovascular - Adult  Goal: Maintains optimal cardiac output

## 2025-03-14 NOTE — PROGRESS NOTES
IV site swelling/painful per pt it's her 5th IV going bad, informed Dr Dasilva. Order placed for midline catheter.

## 2025-03-14 NOTE — PROGRESS NOTES
V2.0    [facility] [place]      Name:  Stacie Donato /Age/Sex: 1980  (44 y.o. female)   MRN & CSN:  [id number] & [id number] Encounter Date/Time: 3/14/2025 8:35 AM EDT   Location:  [id number] PCP: No primary care provider on file.     Attending:David Dasilva DO       Hospital Day: 8  Brief Hospital Course  Stacie Donato is a 44 y.o. female with pertinent PMHx of T2DM who presented complaining of pleuritic chest pain.  She was admitted with left-sided pleural effusion.  Assessment & Plan     Bilateral pulmonary nodules with cavitation  Pleuritic chest pain  Left-sided pleural effusion  -Continue IV Zosyn and IV linezolid for antibiotic coverage  -Prednisone 20 mg daily  -Pneumonia panel and respiratory culture positive for strep agalactiae, MRSA nares positive  -S/p paracentesis 3/12 with 250 mL removed, appears parapneumonic  -Infectious diseases following  -Pulmonology following    Type 2 diabetes  -Continue Lantus 22 units units nightly  -Lispro 5 units 3 times daily with meals, may need adjustment to her prandial dosing now that her appetite has increased hands started on steroid  -Medium dose sliding scale correctional insulin 4 times a day with meals and nightly  -Check point-of-care glucose 4 times daily with meals and nightly to monitor for hypoglycemia from insulin toxicity    Hyponatremia  -Urine sodium low, urine osmolality high  -Continue fluid resuscitation with normal saline  -Check BMP tomorrow      Diet ADULT DIET; Regular; 4 carb choices (60 gm/meal)   DVT Prophylaxis [x] Lovenox, []  Heparin, [] SCDs, [] Ambulation,  [] Eliquis, [] Xarelto  [] Coumadin   Code Status Full Code   Disposition From: Home  Expected Disposition: Home  Estimated Date of Discharge: 3/14           Subjective:     Chief Complaint: Chest pain    Patient was seen and examined today lying in bed  Reports feeling up and down, does say that her left-sided pleuritic pain has gotten slightly better  Still has  Decision Support Exception - unselect if not a suspected or confirmed emergency medical condition->Emergency Medical Condition (MA) Reason for Exam: lt flank pain w n Relevant Medical/Surgical History: gino FINDINGS: Lower Chest: Trace left pleural effusion nodular opacities within the left lower lobe with the largest measuring up to 16 mm predominately in the subpleural region.  There are 3-4 mm subpleural nodules in the right middle and lower lobes (image 8, and 27). Organs: Status post cholecystectomy.  The liver is slightly decreased in attenuation.  The liver, spleen, pancreas and adrenal glands are without acute focal abnormality.  No renal or ureteral calculus.  No hydronephrosis. Mild nonspecific perinephric stranding bilaterally.  No biliary duct dilation. GI/Bowel: No dilated loops of bowel or bowel wall thickening.  Colonic diverticulosis without acute diverticulitis.  No free air.  The appendix is normal. Pelvis: The bladder is not well-distended.  No pathologically enlarged adenopathy or free fluid.  There is likely a subserosal left lower uterine segment fibroid measuring up to at least 6.0 cm.  The uterus and ovaries are otherwise grossly unremarkable. Peritoneum/Retroperitoneum: The aorta is normal in caliber with mild atherosclerosis.  The celiac axis, SMA and MICHAEL are patent.  The portal venous system is patent.  No ascites or drainable fluid collection.  No pathologically enlarged adenopathy. Bones/Soft Tissues: No acute findings in the bones or soft tissues.     1. No acute abdominal or pelvic abnormality. 2. Mild hepatic steatosis. 3. 6.0 cm subserosal left lower uterine segment fibroid. 4. Trace left pleural effusion with nodular opacities in the left lower lobe the largest measuring up to 16 mm. Findings may be infectious or inflammatory in etiology.  Septic emboli could be a possibility in the appropriate clinical setting.  Recommend dedicated CT of the chest for further evaluation.     CT  Clear 03/05/2025 12:19 PM    LEUKOCYTESUR Negative 03/05/2025 12:19 PM    UROBILINOGEN 1.0 03/05/2025 12:19 PM    BILIRUBINUR Negative 03/05/2025 12:19 PM    BLOODU Negative 03/05/2025 12:19 PM    GLUCOSEU >=1000 03/05/2025 12:19 PM    GLUCOSEU Negative 03/05/2012 06:50 PM    KETUA TRACE 03/05/2025 12:19 PM     Urine Cultures: No results found for: \"LABURIN\"  Blood Cultures:   Lab Results   Component Value Date/Time    BC No Growth after 4 days of incubation. 03/07/2025 05:54 PM     Lab Results   Component Value Date/Time    BLOODCULT2 No Growth after 4 days of incubation. 03/07/2025 05:58 PM     Organism:   Lab Results   Component Value Date/Time    ORG Strep agalactiae (Beta Strep Group B) 03/10/2025 06:16 PM    ORG Streptococcus agalactiae (BHS Gr B) DNA Detected 03/10/2025 06:16 PM         Electronically signed by David Dasilva DO on 3/14/2025 at 8:19 AM

## 2025-03-14 NOTE — CARE COORDINATION
Discharge Planning:  Per attending, pt will remain in the hospital through the weekend.  ID is following.  Pt does not have insurance.  Pt will have a ride home at d/c.   PLAN: Pt is from home with her boyfriend. SVDP and clinic list provided.  Will be here over the weekend per attending.  ID is following.  No insurance.   SHOAIB Mensah  [phone]  Electronically signed by SHOAIB Bartholomew on 3/14/2025 at 2:47 PM

## 2025-03-14 NOTE — PROGRESS NOTES
[facility]  Glycemic Control      NAME: Stacie Donato  MEDICAL RECORD NUMBER:  [id number]  AGE: 44 y.o.   GENDER: female  : 1980  EPISODE DATE:  3/14/2025     Data     Recent Labs     25  0746 25  1114 25  1628 25  1944 25  0722 25  1126   POCGLU 250* 212* 195* 259* 165* 288*       HgBA1c:    Lab Results   Component Value Date/Time    LABA1C 10.3 2025 12:07 PM       BUN/Creatinine:    Lab Results   Component Value Date/Time    BUN 6 2025 04:46 AM    CREATININE 0.5 2025 04:46 AM      Latest Reference Range & Units 25 04:46   Est, Glom Filt Rate >60  >90     FIB-4 Calculation: 0.62 at 3/14/2025  4:46 AM  Calculated from:  SGOT/AST: 41 U/L at 3/7/2025 11:24 AM  SGPT/ALT: 38 U/L at 3/7/2025 11:24 AM  Platelets: 453 K/uL at 3/14/2025  4:46 AM  Age: 44 years    Medications  Scheduled Medications:   insulin lispro  0.08 Units/kg SubCUTAneous TID WC    insulin glargine  0.25 Units/kg SubCUTAneous Nightly    predniSONE  20 mg Oral Daily    piperacillin-tazobactam  4,500 mg IntraVENous Q8H    methocarbamol  1,000 mg Oral 4x Daily    linezolid  600 mg IntraVENous Q12H    guaiFENesin  600 mg Oral BID    sennosides-docusate sodium  2 tablet Oral Daily    insulin lispro  0-8 Units SubCUTAneous 4x Daily AC & HS    sodium chloride flush  5-40 mL IntraVENous 2 times per day    enoxaparin  30 mg SubCUTAneous BID       Diet  Current diet/supplement order: ADULT DIET; Regular; 4 carb choices (60 gm/meal)     Recorded PO: PO Meals Eaten (%): 76 - 100% last meal in flowsheets      Action      Chart reviewed    Glucose Target: 140-180, avoid hypoglycemia     Total Daily Insulin:  3/14/2025: 14 units (as of 1624pm)  3/13/2025: 52 units     Recommendation(s):Increase Basal/Bolus to weight based dosing  (Basal weight based dosin.8 units, Prandial weight based dosin.275 units TID with meals)      DM Needs at D/C:  - Insulin pens (Basal/Bolus) Needs  Order  - Insulin Pen needles (4mm) Needs Order  - Glucometer and testing Supplies Order sent to [facility]  - [facility] meds/Meds to bed (SW notified)  - [facility]  Pharmacy Referral Faxed twice 3/12/2025 at 1354pm  - [facility] referral     Physician Notified of event: Yes, David Dasilva, DO 3/14/2025 at 1629pm       Medication plan updated: Yes      Electronically signed by Laina Siu RN on 3/14/2025 at 4:34 PM

## 2025-03-14 NOTE — PLAN OF CARE
Problem: Discharge Planning  Goal: Discharge to home or other facility with appropriate resources  3/13/2025 2346 by Katherine Kim RN  Outcome: Progressing  3/13/2025 1734 by Thu Kaye RN  Outcome: Progressing     Problem: Pain  Goal: Verbalizes/displays adequate comfort level or baseline comfort level  3/13/2025 2346 by Katherine Kim RN  Outcome: Progressing  3/13/2025 1734 by Thu Kaye RN  Outcome: Progressing     Problem: Respiratory - Adult  Goal: Achieves optimal ventilation and oxygenation  3/13/2025 2346 by Katherine Kim RN  Outcome: Progressing  3/13/2025 1734 by Thu Kaye RN  Outcome: Progressing     Problem: Skin/Tissue Integrity - Adult  Goal: Skin integrity remains intact  3/13/2025 2346 by Katherine Kim RN  Outcome: Progressing  3/13/2025 1734 by Thu Kaye RN  Outcome: Progressing     Problem: Gastrointestinal - Adult  Goal: Minimal or absence of nausea and vomiting  3/13/2025 2346 by Katherine Kim RN  Outcome: Progressing  3/13/2025 1734 by Thu Kaye RN  Outcome: Progressing     Problem: Genitourinary - Adult  Goal: Absence of urinary retention  3/13/2025 2346 by Katherine Kim RN  Outcome: Progressing  3/13/2025 1734 by Thu Kaye RN  Outcome: Progressing     Problem: Infection - Adult  Goal: Absence of infection at discharge  3/13/2025 2346 by Katherine Kim RN  Outcome: Progressing  3/13/2025 1734 by Thu Kaye RN  Outcome: Progressing     Problem: Metabolic/Fluid and Electrolytes - Adult  Goal: Electrolytes maintained within normal limits  3/13/2025 2346 by Katherine Kim RN  Outcome: Progressing  3/13/2025 1734 by Thu Kaye RN  Outcome: Progressing     Problem: Hematologic - Adult  Goal: Maintains hematologic stability  3/13/2025 2346 by Katherine Kim RN  Outcome: Progressing  3/13/2025 1734 by Thu Kaye RN  Outcome: Progressing     Problem: Cardiovascular - Adult  Goal: Maintains optimal cardiac output  (including use of illegal substances)  2. Notify security of behavior or suspected illegal substances which indicate the need for search of the family and/or belongings  3. Encourage verbalization of thoughts and concerns in a socially appropriate manner  4. Utilize positive, consistent limit setting strategies supporting safety of patient, staff and others  5. Encourage participation in the decision making process about the behavioral management agreement  6. If a visitor's behavior poses a threat to safety call refer to organization policy.  7. Initiate consult with , Psychosocial CNS, Spiritual Care as appropriate  3/13/2025 2346 by Katherine Kim, RN  Outcome: Progressing  3/13/2025 1734 by Thu Kaye, RN  Outcome: Progressing     Problem: Safety - Adult  Goal: Free from fall injury  3/13/2025 2346 by Katherine Kim, RN  Outcome: Progressing  3/13/2025 1734 by Thu Kaye, RN  Outcome: Progressing

## 2025-03-14 NOTE — PROCEDURES
PROCEDURE NOTE  Date: 3/14/2025   Name: Stacie Donato  YOB: 1980    Arrived to the pt room with bedside RN Ileana. Timeout completed. Answered patient questions prior to procedure. Able to access R basilic vein with no difficulty. Catheter flushes well, good blood return. Midline dressed with chg dressing and pt instructed on care. Pt tolerated well. Bed returned to lowest locked position, call light in reach. Reported off to bedside RN.

## 2025-03-14 NOTE — PROGRESS NOTES
Ht 1.524 m (5')   Wt 111.3 kg (245 lb 6 oz)   SpO2 94%   BMI 47.92 kg/m²     General Appearance: alert,in no acute distress, +  pallor, no icterus morbid obesity poor dentition  Skin: warm and dry, no rash or erythema  Head: normocephalic and atraumatic  Eyes: pupils equal, round, and reactive to light, conjunctivae normal  ENT: tympanic membrane, external ear and ear canal normal bilaterally, nose without deformity, nasal mucosa and turbinates normal without polyps  Neck: supple and non-tender without mass, no thyromegaly  no cervical lymphadenopathy  Pulmonary/Chest: Left base coarse crepitation- no wheezes, rales or rhonchi, normal air movement, no respiratory distress  Cardiovascular: normal rate, regular rhythm, normal S1 and S2, no murmurs, rubs, clicks, or gallops, no carotid bruits  Abdomen: soft, non-tender, non-distended, normal bowel sounds, no masses or organomegaly  Extremities: no cyanosis, clubbing or edema  Musculoskeletal: normal range of motion, no joint swelling, deformity or tenderness  Integumentary: No rashes, no abnormal skin lesions, no petechiae  Neurologic: reflexes normal and symmetric, no cranial nerve deficit  Psych:  Orientation, sensorium, mood normal  Lines:  IV       Data Review:    CBC:   Lab Results   Component Value Date    WBC 11.3 (H) 03/14/2025    HGB 13.4 03/14/2025    HCT 39.0 03/14/2025    MCV 94.7 03/14/2025     (H) 03/14/2025     RENAL:   Lab Results   Component Value Date    CREATININE 0.5 (L) 03/14/2025    BUN 6 (L) 03/14/2025     (L) 03/14/2025    K 3.4 (L) 03/14/2025    CL 96 (L) 03/14/2025    CO2 26 03/14/2025     SED RATE:   Lab Results   Component Value Date/Time    SEDRATE 60 03/08/2025 05:46 AM     CK: No results found for: \"CKTOTAL\"  CRP:   Lab Results   Component Value Date/Time    CRP 86.3 03/09/2025 05:26 AM     Hepatic Function Panel:   Lab Results   Component Value Date/Time    ALKPHOS 137 03/07/2025 11:24 AM    ALT 38 03/07/2025 11:24 AM     Fluid 82   Lymphocytes, Body Fluid 17   Eos, Fluid 1   Number of Cells Counted Fluid 100   Comment: The reference interval(s) and other method performance  specifications are unavailable for this body fluid.  Comparison of the result with concentration in the  blood, serum, or plasma is recommended.   Volume 250.0   Resulting Agency [facility]        MICRO: cultures reviewed and updated by me            PNEUMONIA PANEL FILM ARRAY REPORT [[id number]] Collected: 03/10/25 1816   Order Status: Completed Updated: 03/10/25 2305    Report SEE IMAGE   Pneumonia Panel, Molecular [[id number]] (Abnormal) Collected: 03/10/25 1816   Order Status: Completed Specimen: Sputum Expectorated Updated: 03/10/25 2303    Organism Streptococcus agalactiae (BHS Gr B) DNA Detected Abnormal     Pneumonia Panel Molecular --    100,000 copies/mL  See additional report for complete Pneumonia panel.  Susceptibility testing of penicillin and other beta lactams is  not necessary for beta hemolytic Streptococci since resistant  strains have not been identified. (CLSI M100)   Narrative:     ORDER#: [id number]                          ORDERED BY: ELSIE MANJARREZ  SOURCE: Sputum Expectorated                COLLECTED:  03/10/25 18:16  ANTIBIOTICS AT JERROD.:                      RECEIVED :  03/10/25 18:20   Culture, Respiratory [[id number]] Collected: 03/10/25 1816   Order Status: Sent Specimen: Sputum Expectorated Updated: 03/10/25 2302    Gram Stain Result 3+ Epithelial Cells present  4+ WBC's (Polymorphonuclear) present  4+ WBC's (Mononuclear) present  3+ Gram variable rods present  2+ Gram positive cocci   Narrative:     ORDER#: [id number]                          ORDERED BY: ELSIE MANJARREZ  SOURCE: Sputum Expectorated                COLLECTED:  03/10/25 18:16  ANTIBIOTICS AT JERROD.:                      RECEIVED :  03/10/25 18:20   Quantiferon TB Gold [[id number]] Collected: 03/09/25 1636   Order Status: Sent Specimen: Blood Updated:

## 2025-03-15 LAB
ANION GAP SERPL CALCULATED.3IONS-SCNC: 9 MMOL/L (ref 3–16)
BACTERIA FLD AEROBE CULT: NORMAL
BASOPHILS # BLD: 0.1 K/UL (ref 0–0.2)
BASOPHILS NFR BLD: 0.6 %
BUN SERPL-MCNC: 6 MG/DL (ref 7–20)
CALCIUM SERPL-MCNC: 9.4 MG/DL (ref 8.3–10.6)
CHLORIDE SERPL-SCNC: 100 MMOL/L (ref 99–110)
CO2 SERPL-SCNC: 29 MMOL/L (ref 21–32)
CREAT SERPL-MCNC: 0.6 MG/DL (ref 0.6–1.1)
CRP SERPL-MCNC: 75.1 MG/L (ref 0–5.1)
DEPRECATED RDW RBC AUTO: 11.9 % (ref 12.4–15.4)
EOSINOPHIL # BLD: 0.1 K/UL (ref 0–0.6)
EOSINOPHIL NFR BLD: 0.9 %
ERYTHROCYTE [SEDIMENTATION RATE] IN BLOOD BY WESTERGREN METHOD: 96 MM/HR (ref 0–20)
GFR SERPLBLD CREATININE-BSD FMLA CKD-EPI: >90 ML/MIN/{1.73_M2}
GLUCOSE BLD-MCNC: 143 MG/DL (ref 70–99)
GLUCOSE BLD-MCNC: 213 MG/DL (ref 70–99)
GLUCOSE BLD-MCNC: 213 MG/DL (ref 70–99)
GLUCOSE BLD-MCNC: 227 MG/DL (ref 70–99)
GLUCOSE SERPL-MCNC: 157 MG/DL (ref 70–99)
GRAM STN SPEC: NORMAL
HCT VFR BLD AUTO: 36.7 % (ref 36–48)
HGB BLD-MCNC: 12.6 G/DL (ref 12–16)
LYMPHOCYTES # BLD: 1.8 K/UL (ref 1–5.1)
LYMPHOCYTES NFR BLD: 18 %
MCH RBC QN AUTO: 33 PG (ref 26–34)
MCHC RBC AUTO-ENTMCNC: 34.4 G/DL (ref 31–36)
MCV RBC AUTO: 95.9 FL (ref 80–100)
MONOCYTES # BLD: 0.6 K/UL (ref 0–1.3)
MONOCYTES NFR BLD: 5.6 %
NEUTROPHILS # BLD: 7.4 K/UL (ref 1.7–7.7)
NEUTROPHILS NFR BLD: 74.9 %
PERFORMED ON: ABNORMAL
PLATELET # BLD AUTO: 464 K/UL (ref 135–450)
PMV BLD AUTO: 7 FL (ref 5–10.5)
POTASSIUM SERPL-SCNC: 3.8 MMOL/L (ref 3.5–5.1)
RBC # BLD AUTO: 3.83 M/UL (ref 4–5.2)
SODIUM SERPL-SCNC: 138 MMOL/L (ref 136–145)
WBC # BLD AUTO: 9.9 K/UL (ref 4–11)

## 2025-03-15 PROCEDURE — 2580000003 HC RX 258: Performed by: INTERNAL MEDICINE

## 2025-03-15 PROCEDURE — 99232 SBSQ HOSP IP/OBS MODERATE 35: CPT | Performed by: INTERNAL MEDICINE

## 2025-03-15 PROCEDURE — 94760 N-INVAS EAR/PLS OXIMETRY 1: CPT

## 2025-03-15 PROCEDURE — 80048 BASIC METABOLIC PNL TOTAL CA: CPT

## 2025-03-15 PROCEDURE — 6360000002 HC RX W HCPCS: Performed by: HOSPITALIST

## 2025-03-15 PROCEDURE — 6370000000 HC RX 637 (ALT 250 FOR IP): Performed by: STUDENT IN AN ORGANIZED HEALTH CARE EDUCATION/TRAINING PROGRAM

## 2025-03-15 PROCEDURE — 36415 COLL VENOUS BLD VENIPUNCTURE: CPT

## 2025-03-15 PROCEDURE — 6370000000 HC RX 637 (ALT 250 FOR IP): Performed by: INTERNAL MEDICINE

## 2025-03-15 PROCEDURE — 6360000002 HC RX W HCPCS: Performed by: INTERNAL MEDICINE

## 2025-03-15 PROCEDURE — 2500000003 HC RX 250 WO HCPCS: Performed by: HOSPITALIST

## 2025-03-15 PROCEDURE — 6370000000 HC RX 637 (ALT 250 FOR IP): Performed by: HOSPITALIST

## 2025-03-15 PROCEDURE — 85652 RBC SED RATE AUTOMATED: CPT

## 2025-03-15 PROCEDURE — 2500000003 HC RX 250 WO HCPCS: Performed by: STUDENT IN AN ORGANIZED HEALTH CARE EDUCATION/TRAINING PROGRAM

## 2025-03-15 PROCEDURE — 86140 C-REACTIVE PROTEIN: CPT

## 2025-03-15 PROCEDURE — 1200000000 HC SEMI PRIVATE

## 2025-03-15 PROCEDURE — 85025 COMPLETE CBC W/AUTO DIFF WBC: CPT

## 2025-03-15 RX ADMIN — INSULIN LISPRO 9 UNITS: 100 INJECTION, SOLUTION INTRAVENOUS; SUBCUTANEOUS at 12:34

## 2025-03-15 RX ADMIN — OXYCODONE HYDROCHLORIDE AND ACETAMINOPHEN 1 TABLET: 5; 325 TABLET ORAL at 05:02

## 2025-03-15 RX ADMIN — ENOXAPARIN SODIUM 30 MG: 100 INJECTION SUBCUTANEOUS at 20:23

## 2025-03-15 RX ADMIN — SODIUM CHLORIDE, PRESERVATIVE FREE 10 ML: 5 INJECTION INTRAVENOUS at 20:24

## 2025-03-15 RX ADMIN — INSULIN LISPRO 9 UNITS: 100 INJECTION, SOLUTION INTRAVENOUS; SUBCUTANEOUS at 17:53

## 2025-03-15 RX ADMIN — METHOCARBAMOL 1000 MG: 500 TABLET ORAL at 12:33

## 2025-03-15 RX ADMIN — METHOCARBAMOL 1000 MG: 500 TABLET ORAL at 17:54

## 2025-03-15 RX ADMIN — INSULIN LISPRO 2 UNITS: 100 INJECTION, SOLUTION INTRAVENOUS; SUBCUTANEOUS at 20:23

## 2025-03-15 RX ADMIN — MORPHINE SULFATE 1 MG: 2 INJECTION, SOLUTION INTRAMUSCULAR; INTRAVENOUS at 09:12

## 2025-03-15 RX ADMIN — METHOCARBAMOL 1000 MG: 500 TABLET ORAL at 20:22

## 2025-03-15 RX ADMIN — SODIUM CHLORIDE, PRESERVATIVE FREE 10 ML: 5 INJECTION INTRAVENOUS at 09:16

## 2025-03-15 RX ADMIN — GUAIFENESIN 600 MG: 600 TABLET ORAL at 20:23

## 2025-03-15 RX ADMIN — LINEZOLID 600 MG: 600 TABLET, FILM COATED ORAL at 20:22

## 2025-03-15 RX ADMIN — INSULIN LISPRO 2 UNITS: 100 INJECTION, SOLUTION INTRAVENOUS; SUBCUTANEOUS at 17:53

## 2025-03-15 RX ADMIN — INSULIN LISPRO 9 UNITS: 100 INJECTION, SOLUTION INTRAVENOUS; SUBCUTANEOUS at 09:15

## 2025-03-15 RX ADMIN — PREDNISONE 20 MG: 20 TABLET ORAL at 09:15

## 2025-03-15 RX ADMIN — PIPERACILLIN AND TAZOBACTAM 4500 MG: 4; .5 INJECTION, POWDER, LYOPHILIZED, FOR SOLUTION INTRAVENOUS at 09:14

## 2025-03-15 RX ADMIN — OXYCODONE HYDROCHLORIDE AND ACETAMINOPHEN 1 TABLET: 5; 325 TABLET ORAL at 11:23

## 2025-03-15 RX ADMIN — OXYCODONE HYDROCHLORIDE AND ACETAMINOPHEN 1 TABLET: 5; 325 TABLET ORAL at 20:29

## 2025-03-15 RX ADMIN — LINEZOLID 600 MG: 600 TABLET, FILM COATED ORAL at 09:15

## 2025-03-15 RX ADMIN — MORPHINE SULFATE 1 MG: 2 INJECTION, SOLUTION INTRAMUSCULAR; INTRAVENOUS at 17:56

## 2025-03-15 RX ADMIN — LIDOCAINE HYDROCHLORIDE: 20 SOLUTION ORAL at 12:33

## 2025-03-15 RX ADMIN — PIPERACILLIN AND TAZOBACTAM 4500 MG: 4; .5 INJECTION, POWDER, LYOPHILIZED, FOR SOLUTION INTRAVENOUS at 01:43

## 2025-03-15 RX ADMIN — INSULIN GLARGINE 28 UNITS: 100 INJECTION, SOLUTION SUBCUTANEOUS at 20:23

## 2025-03-15 RX ADMIN — GUAIFENESIN 600 MG: 600 TABLET ORAL at 09:15

## 2025-03-15 RX ADMIN — PIPERACILLIN AND TAZOBACTAM 4500 MG: 4; .5 INJECTION, POWDER, LYOPHILIZED, FOR SOLUTION INTRAVENOUS at 17:53

## 2025-03-15 RX ADMIN — INSULIN LISPRO 1 UNITS: 100 INJECTION, SOLUTION INTRAVENOUS; SUBCUTANEOUS at 12:34

## 2025-03-15 RX ADMIN — ENOXAPARIN SODIUM 30 MG: 100 INJECTION SUBCUTANEOUS at 09:15

## 2025-03-15 RX ADMIN — METHOCARBAMOL 1000 MG: 500 TABLET ORAL at 09:15

## 2025-03-15 ASSESSMENT — PAIN DESCRIPTION - LOCATION
LOCATION: ABDOMEN;BACK
LOCATION: RIB CAGE
LOCATION: RIB CAGE
LOCATION: RIB CAGE;ABDOMEN

## 2025-03-15 ASSESSMENT — PAIN DESCRIPTION - DESCRIPTORS
DESCRIPTORS: SHARP;THROBBING;DISCOMFORT
DESCRIPTORS: THROBBING
DESCRIPTORS: SPASM

## 2025-03-15 ASSESSMENT — PAIN SCALES - GENERAL
PAINLEVEL_OUTOF10: 7
PAINLEVEL_OUTOF10: 7
PAINLEVEL_OUTOF10: 8
PAINLEVEL_OUTOF10: 8
PAINLEVEL_OUTOF10: 10

## 2025-03-15 ASSESSMENT — PAIN DESCRIPTION - ORIENTATION
ORIENTATION: LEFT

## 2025-03-15 ASSESSMENT — PAIN DESCRIPTION - PAIN TYPE: TYPE: ACUTE PAIN

## 2025-03-15 ASSESSMENT — PAIN DESCRIPTION - ONSET: ONSET: ON-GOING

## 2025-03-15 NOTE — PROGRESS NOTES
Pulmonary Progress Note    Date of Admission: 3/7/2025   LOS: 8 days     Chief Complaint   Patient presents with    Abdominal Pain    Flank Pain     Pt c/o L flank abd pain since Wednesday; sts seen then also in ED here; reports pain is \"sharp\" and feels nauseated, alert and oriented, skin pink warm and dry, resp even and unlabored.       Assessment/Plan:       Cavitary lung nodules  Parapneumonic effusion  -Antibiotics per ID will likely need outpatient surveillance CT scan to ensure resolution  -On prednisone for pleurisy  -Tolerating room air    Future Appointments   Date Time Provider Department Center   3/24/2025  9:00 AM SCHEDULE, [facility] SCHEDULE [facility]   4/15/2025  1:40 PM Husam Sutherland MD  PULM [facility]       24 Hour Events/Subjective  Tolerating room air.  Pleurisy slightly improved.    ROS:   No nausea  No Vomiting  No chest pain      Intake/Output Summary (Last 24 hours) at 3/15/2025 1034  Last data filed at 3/15/2025 0932  Gross per 24 hour   Intake 480 ml   Output --   Net 480 ml         PHYSICAL EXAM:   Blood pressure 129/82, pulse 84, temperature 98.6 °F (37 °C), temperature source Oral, resp. rate 18, height 1.524 m (5'), weight 112.4 kg (247 lb 12.8 oz), SpO2 96%, not currently breastfeeding.'  Gen:  No acute distress.   Resp:  No crackles. No wheezes. No rhonchi. No dullness on percussion.  CV: Regular rate. Regular rhythm. No murmur or rub. No edema.   Neuro:  no focal neurologic deficit.  Moves all extremities  Psych: Awake and alert  Mood stable.      Labs reviewed:  CBC:   Recent Labs     03/13/25  0514 03/14/25  0446 03/15/25  0511   WBC 9.0 11.3* 9.9   HGB 12.8 13.4 12.6   HCT 36.4 39.0 36.7   MCV 95.5 94.7 95.9    453* 464*     BMP:   Recent Labs     03/13/25  0704 03/14/25  0446 03/15/25  0511   * 132* 138   K 3.3* 3.4* 3.8   CL 95* 96* 100   CO2 24 26 29   BUN 7 6* 6*   CREATININE 0.5* 0.5* 0.6     LIVER PROFILE: No results for input(s): \"AST\", \"ALT\", \"LIPASE\",  \"AMYLASE\", \"BILIDIR\", \"BILITOT\", \"ALKPHOS\" in the last 72 hours.    Invalid input(s): \"ALB\"  PT/INR:   Recent Labs     03/12/25  1315   PROTIME 13.4   INR 1.00           Films:  Radiology Review:  Pertinent images / reports were reviewed as a part of this visit.            This note was transcribed using Dragon Dictation software. Please disregard any translational errors.    Thank you for this consult,    Osmin Tracy MD  [facility] Pulmonary, Critical Care, and Sleep Medicine

## 2025-03-15 NOTE — PROGRESS NOTES
V2.0    [facility] [place] [facility] Progress Note      Name:  Stacie Donato /Age/Sex: 1980  (44 y.o. female)   MRN & CSN:  [id number] & [id number] Encounter Date/Time: 3/15/2025 8:35 AM EDT   Location:  [id number]/4117-01 PCP: No primary care provider on file.     Attending:David Dasilva DO       Hospital Day: 9  Brief Hospital Course  Stacie Donato is a 44 y.o. female with pertinent PMHx of T2DM who presented complaining of pleuritic chest pain.  She was admitted with left-sided pleural effusion.  Assessment & Plan     Bilateral pulmonary nodules with cavitation  Pleuritic chest pain  Left-sided pleural effusion  -Continue IV Zosyn and oral linezolid  -Prednisone 20 mg daily for pleuritic pain  -Pneumonia panel and respiratory culture positive for strep agalactiae, MRSA nares positive  -S/p paracentesis 3/12 with 250 mL removed, appears parapneumonic  -Infectious diseases following  -Pulmonology following    Type 2 diabetes  -Increased Lantus to 28 units nightly  -Increase lispro to 9 units 3 times daily with meals  -Medium dose sliding scale correctional insulin 4 times a day with meals and nightly  -Check point-of-care glucose 4 times daily with meals and nightly to monitor for hypoglycemia from insulin toxicity    Hyponatremia, improved  -S/p fluid resuscitation  -Check BMP tomorrow      Diet ADULT DIET; Regular; 4 carb choices (60 gm/meal)   DVT Prophylaxis [x] Lovenox, []  Heparin, [] SCDs, [] Ambulation,  [] Eliquis, [] Xarelto  [] Coumadin   Code Status Full Code   Disposition From: Home  Expected Disposition: Home  Estimated Date of Discharge: 3/17           Subjective:     Chief Complaint: Chest pain    Patient was seen and examined today lying in bed  Says that she is having a different type pain today, believes it is more so in her stomach feels like a intermittent crampy-like pain  Denies noticing any fevers or chills  Says her breathing is little bit better    Review of Systems:      Pertinent positives and  Clear 03/05/2025 12:19 PM    LEUKOCYTESUR Negative 03/05/2025 12:19 PM    UROBILINOGEN 1.0 03/05/2025 12:19 PM    BILIRUBINUR Negative 03/05/2025 12:19 PM    BLOODU Negative 03/05/2025 12:19 PM    GLUCOSEU >=1000 03/05/2025 12:19 PM    GLUCOSEU Negative 03/05/2012 06:50 PM    KETUA TRACE 03/05/2025 12:19 PM     Urine Cultures: No results found for: \"LABURIN\"  Blood Cultures:   Lab Results   Component Value Date/Time    BC No Growth after 4 days of incubation. 03/07/2025 05:54 PM     Lab Results   Component Value Date/Time    BLOODCULT2 No Growth after 4 days of incubation. 03/07/2025 05:58 PM     Organism:   Lab Results   Component Value Date/Time    ORG Strep agalactiae (Beta Strep Group B) 03/10/2025 06:16 PM    ORG Streptococcus agalactiae (BHS Gr B) DNA Detected 03/10/2025 06:16 PM         Electronically signed by David Dasilva DO on 3/15/2025 at 8:33 AM

## 2025-03-15 NOTE — PLAN OF CARE
Problem: Discharge Planning  Goal: Discharge to home or other facility with appropriate resources  3/14/2025 2357 by Ariadna Salinas RN  Outcome: Progressing     Problem: Pain  Goal: Verbalizes/displays adequate comfort level or baseline comfort level  3/14/2025 2357 by Ariadna Salinas RN  Outcome: Progressing     Problem: Respiratory - Adult  Goal: Achieves optimal ventilation and oxygenation  3/14/2025 2357 by Ariadna Salinas RN  Outcome: Progressing     Problem: Skin/Tissue Integrity - Adult  Goal: Skin integrity remains intact  3/14/2025 2357 by Ariadna Salinas RN  Outcome: Progressing     Problem: Gastrointestinal - Adult  Goal: Minimal or absence of nausea and vomiting  3/14/2025 2357 by Ariadna Salinas RN  Outcome: Progressing     Problem: Genitourinary - Adult  Goal: Absence of urinary retention  3/14/2025 2357 by Ariadna Salinas RN  Outcome: Progressing     Problem: Infection - Adult  Goal: Absence of infection at discharge  3/14/2025 2357 by Ariadna Salinas RN  Outcome: Progressing     Problem: Metabolic/Fluid and Electrolytes - Adult  Goal: Electrolytes maintained within normal limits  3/14/2025 2357 by Ariadna Salinas RN  Outcome: Progressing     Problem: Hematologic - Adult  Goal: Maintains hematologic stability  3/14/2025 2357 by Ariadna Salinas RN  Outcome: Progressing     Problem: Cardiovascular - Adult  Goal: Maintains optimal cardiac output and hemodynamic stability  3/14/2025 2357 by Ariadna Salinas RN  Outcome: Progressing     Problem: ABCDS Injury Assessment  Goal: Absence of physical injury  3/14/2025 2357 by Ariadna Salinas RN  Outcome: Progressing     Problem: Chronic Conditions and Co-morbidities  Goal: Patient's chronic conditions and co-morbidity symptoms are monitored and maintained or improved  3/14/2025 2357 by Ariadna Salinas RN  Outcome: Progressing     Problem: Anxiety  Goal: Will report anxiety at manageable levels  Description: INTERVENTIONS:  1. Administer medication as  ordered  2. Teach and rehearse alternative coping skills  3. Provide emotional support with 1:1 interaction with staff  3/14/2025 2357 by Ariadna Salinas RN  Outcome: Progressing     Problem: Coping  Goal: Pt/Family able to verbalize concerns and demonstrate effective coping strategies  Description: INTERVENTIONS:  1. Assist patient/family to identify coping skills, available support systems and cultural and spiritual values  2. Provide emotional support, including active listening and acknowledgement of concerns of patient and caregivers  3. Reduce environmental stimuli, as able  4. Instruct patient/family in relaxation techniques, as appropriate  5. Assess for spiritual pain/suffering and initiate Spiritual Care, Psychosocial Clinical Specialist consults as needed  3/14/2025 2357 by Ariadna Salinas, RN  Outcome: Progressing     Problem: Behavior  Goal: Pt/Family maintain appropriate behavior and adhere to behavioral management agreement, if implemented  Description: INTERVENTIONS:  1. Assess patient/family's coping skills and  non-compliant behavior (including use of illegal substances)  2. Notify security of behavior or suspected illegal substances which indicate the need for search of the family and/or belongings  3. Encourage verbalization of thoughts and concerns in a socially appropriate manner  4. Utilize positive, consistent limit setting strategies supporting safety of patient, staff and others  5. Encourage participation in the decision making process about the behavioral management agreement  6. If a visitor's behavior poses a threat to safety call refer to organization policy.  7. Initiate consult with , Psychosocial CNS, Spiritual Care as appropriate  3/14/2025 2357 by Ariadna Salinas, RN  Outcome: Progressing     Problem: Safety - Adult  Goal: Free from fall injury  3/14/2025 2357 by Ariadna Salinas, RN  Outcome: Progressing

## 2025-03-15 NOTE — PROGRESS NOTES
Patient alert and oriented X4. Vital sings recorded. Patient new midline placed. Patient tolerated well. Patient states 7 out 10 left rib cage pain. Followed PRN pain management protocol. See MAR. Patient tolerated nightly medication well. Patient verbalized understanding of education. All needed items within reach. Electronically signed by Ariadna Salinas RN on 3/14/2025 at 9:13 PM

## 2025-03-15 NOTE — PLAN OF CARE
Problem: Discharge Planning  Goal: Discharge to home or other facility with appropriate resources  3/15/2025 1031 by Carlitos Barney RN  Outcome: Progressing  Flowsheets (Taken 3/15/2025 0915)  Discharge to home or other facility with appropriate resources:   Identify barriers to discharge with patient and caregiver   Arrange for needed discharge resources and transportation as appropriate  3/14/2025 2357 by Ariadna Salinas RN  Outcome: Progressing     Problem: Pain  Goal: Verbalizes/displays adequate comfort level or baseline comfort level  3/15/2025 1031 by Carlitos Barney RN  Outcome: Progressing  3/14/2025 2357 by Ariadna Salinas RN  Outcome: Progressing     Problem: Respiratory - Adult  Goal: Achieves optimal ventilation and oxygenation  3/15/2025 1031 by Carlitos Barney RN  Outcome: Progressing  3/14/2025 2357 by Ariadna Salinas RN  Outcome: Progressing     Problem: Skin/Tissue Integrity - Adult  Goal: Skin integrity remains intact  3/15/2025 1031 by Carlitos Barney RN  Outcome: Progressing  3/14/2025 2357 by Ariadna Salinas RN  Outcome: Progressing     Problem: Gastrointestinal - Adult  Goal: Minimal or absence of nausea and vomiting  3/15/2025 1031 by Carlitos Barney RN  Outcome: Progressing  3/14/2025 2357 by Ariadna Salinas RN  Outcome: Progressing     Problem: Genitourinary - Adult  Goal: Absence of urinary retention  3/15/2025 1031 by Carlitos Barney RN  Outcome: Progressing  3/14/2025 2357 by Ariadna Salinas RN  Outcome: Progressing     Problem: Infection - Adult  Goal: Absence of infection at discharge  3/15/2025 1031 by Carlitos Barney RN  Outcome: Progressing  3/14/2025 2357 by Ariadna Salinas RN  Outcome: Progressing     Problem: Metabolic/Fluid and Electrolytes - Adult  Goal: Electrolytes maintained within normal limits  3/15/2025 1031 by Carlitos Barney RN  Outcome: Progressing  3/14/2025 2357 by Ariadna Salinas RN  Outcome: Progressing     Problem: Hematologic - Adult  Goal: Maintains  Pt/Family maintain appropriate behavior and adhere to behavioral management agreement, if implemented  Description: INTERVENTIONS:  1. Assess patient/family's coping skills and  non-compliant behavior (including use of illegal substances)  2. Notify security of behavior or suspected illegal substances which indicate the need for search of the family and/or belongings  3. Encourage verbalization of thoughts and concerns in a socially appropriate manner  4. Utilize positive, consistent limit setting strategies supporting safety of patient, staff and others  5. Encourage participation in the decision making process about the behavioral management agreement  6. If a visitor's behavior poses a threat to safety call refer to organization policy.  7. Initiate consult with , Psychosocial CNS, Spiritual Care as appropriate  3/15/2025 1031 by Carlitos Barney, RN  Outcome: Progressing  3/14/2025 2357 by Ariadna Salinas, RN  Outcome: Progressing     Problem: Safety - Adult  Goal: Free from fall injury  3/15/2025 1031 by Carlitos Barney, RN  Outcome: Progressing  3/14/2025 2357 by Ariadna Salinas, RN  Outcome: Progressing

## 2025-03-16 LAB
ANION GAP SERPL CALCULATED.3IONS-SCNC: 8 MMOL/L (ref 3–16)
BASOPHILS # BLD: 0 K/UL (ref 0–0.2)
BASOPHILS NFR BLD: 0.5 %
BUN SERPL-MCNC: 7 MG/DL (ref 7–20)
CALCIUM SERPL-MCNC: 9.1 MG/DL (ref 8.3–10.6)
CHLORIDE SERPL-SCNC: 100 MMOL/L (ref 99–110)
CO2 SERPL-SCNC: 29 MMOL/L (ref 21–32)
CREAT SERPL-MCNC: 0.6 MG/DL (ref 0.6–1.1)
DEPRECATED RDW RBC AUTO: 11.9 % (ref 12.4–15.4)
EOSINOPHIL # BLD: 0.1 K/UL (ref 0–0.6)
EOSINOPHIL NFR BLD: 1 %
GFR SERPLBLD CREATININE-BSD FMLA CKD-EPI: >90 ML/MIN/{1.73_M2}
GLUCOSE BLD-MCNC: 144 MG/DL (ref 70–99)
GLUCOSE BLD-MCNC: 200 MG/DL (ref 70–99)
GLUCOSE BLD-MCNC: 235 MG/DL (ref 70–99)
GLUCOSE BLD-MCNC: 237 MG/DL (ref 70–99)
GLUCOSE SERPL-MCNC: 152 MG/DL (ref 70–99)
HCT VFR BLD AUTO: 34.4 % (ref 36–48)
HGB BLD-MCNC: 11.9 G/DL (ref 12–16)
LYMPHOCYTES # BLD: 1.9 K/UL (ref 1–5.1)
LYMPHOCYTES NFR BLD: 19.6 %
MCH RBC QN AUTO: 33 PG (ref 26–34)
MCHC RBC AUTO-ENTMCNC: 34.6 G/DL (ref 31–36)
MCV RBC AUTO: 95.3 FL (ref 80–100)
MONOCYTES # BLD: 0.6 K/UL (ref 0–1.3)
MONOCYTES NFR BLD: 5.9 %
NEUTROPHILS # BLD: 7.2 K/UL (ref 1.7–7.7)
NEUTROPHILS NFR BLD: 73 %
PERFORMED ON: ABNORMAL
PLATELET # BLD AUTO: 423 K/UL (ref 135–450)
PMV BLD AUTO: 6.5 FL (ref 5–10.5)
POTASSIUM SERPL-SCNC: 4.1 MMOL/L (ref 3.5–5.1)
RBC # BLD AUTO: 3.61 M/UL (ref 4–5.2)
SODIUM SERPL-SCNC: 137 MMOL/L (ref 136–145)
WBC # BLD AUTO: 9.8 K/UL (ref 4–11)

## 2025-03-16 PROCEDURE — 6370000000 HC RX 637 (ALT 250 FOR IP): Performed by: INTERNAL MEDICINE

## 2025-03-16 PROCEDURE — 2500000003 HC RX 250 WO HCPCS: Performed by: HOSPITALIST

## 2025-03-16 PROCEDURE — 1200000000 HC SEMI PRIVATE

## 2025-03-16 PROCEDURE — 36415 COLL VENOUS BLD VENIPUNCTURE: CPT

## 2025-03-16 PROCEDURE — 85025 COMPLETE CBC W/AUTO DIFF WBC: CPT

## 2025-03-16 PROCEDURE — 2580000003 HC RX 258: Performed by: INTERNAL MEDICINE

## 2025-03-16 PROCEDURE — 2500000003 HC RX 250 WO HCPCS: Performed by: STUDENT IN AN ORGANIZED HEALTH CARE EDUCATION/TRAINING PROGRAM

## 2025-03-16 PROCEDURE — 6370000000 HC RX 637 (ALT 250 FOR IP): Performed by: STUDENT IN AN ORGANIZED HEALTH CARE EDUCATION/TRAINING PROGRAM

## 2025-03-16 PROCEDURE — 6360000002 HC RX W HCPCS: Performed by: HOSPITALIST

## 2025-03-16 PROCEDURE — 6360000002 HC RX W HCPCS: Performed by: INTERNAL MEDICINE

## 2025-03-16 PROCEDURE — 6370000000 HC RX 637 (ALT 250 FOR IP): Performed by: HOSPITALIST

## 2025-03-16 PROCEDURE — 80048 BASIC METABOLIC PNL TOTAL CA: CPT

## 2025-03-16 RX ORDER — FLUCONAZOLE 100 MG/1
150 TABLET ORAL ONCE
Status: COMPLETED | OUTPATIENT
Start: 2025-03-16 | End: 2025-03-16

## 2025-03-16 RX ADMIN — GUAIFENESIN 600 MG: 600 TABLET ORAL at 08:37

## 2025-03-16 RX ADMIN — INSULIN LISPRO 2 UNITS: 100 INJECTION, SOLUTION INTRAVENOUS; SUBCUTANEOUS at 16:50

## 2025-03-16 RX ADMIN — LINEZOLID 600 MG: 600 TABLET, FILM COATED ORAL at 21:29

## 2025-03-16 RX ADMIN — PREDNISONE 20 MG: 20 TABLET ORAL at 08:38

## 2025-03-16 RX ADMIN — METHOCARBAMOL 1000 MG: 500 TABLET ORAL at 08:37

## 2025-03-16 RX ADMIN — MORPHINE SULFATE 1 MG: 2 INJECTION, SOLUTION INTRAMUSCULAR; INTRAVENOUS at 13:35

## 2025-03-16 RX ADMIN — INSULIN LISPRO 2 UNITS: 100 INJECTION, SOLUTION INTRAVENOUS; SUBCUTANEOUS at 11:55

## 2025-03-16 RX ADMIN — ENOXAPARIN SODIUM 30 MG: 100 INJECTION SUBCUTANEOUS at 21:29

## 2025-03-16 RX ADMIN — METHOCARBAMOL 1000 MG: 500 TABLET ORAL at 21:29

## 2025-03-16 RX ADMIN — SODIUM CHLORIDE, PRESERVATIVE FREE 10 ML: 5 INJECTION INTRAVENOUS at 21:30

## 2025-03-16 RX ADMIN — INSULIN GLARGINE 28 UNITS: 100 INJECTION, SOLUTION SUBCUTANEOUS at 21:29

## 2025-03-16 RX ADMIN — INSULIN LISPRO 2 UNITS: 100 INJECTION, SOLUTION INTRAVENOUS; SUBCUTANEOUS at 21:28

## 2025-03-16 RX ADMIN — OXYCODONE HYDROCHLORIDE AND ACETAMINOPHEN 1 TABLET: 5; 325 TABLET ORAL at 16:51

## 2025-03-16 RX ADMIN — ENOXAPARIN SODIUM 30 MG: 100 INJECTION SUBCUTANEOUS at 08:37

## 2025-03-16 RX ADMIN — METHOCARBAMOL 1000 MG: 500 TABLET ORAL at 16:51

## 2025-03-16 RX ADMIN — FLUCONAZOLE 150 MG: 100 TABLET ORAL at 11:55

## 2025-03-16 RX ADMIN — PIPERACILLIN AND TAZOBACTAM 4500 MG: 4; .5 INJECTION, POWDER, LYOPHILIZED, FOR SOLUTION INTRAVENOUS at 16:50

## 2025-03-16 RX ADMIN — INSULIN LISPRO 9 UNITS: 100 INJECTION, SOLUTION INTRAVENOUS; SUBCUTANEOUS at 11:56

## 2025-03-16 RX ADMIN — OXYCODONE HYDROCHLORIDE AND ACETAMINOPHEN 1 TABLET: 5; 325 TABLET ORAL at 23:18

## 2025-03-16 RX ADMIN — PIPERACILLIN AND TAZOBACTAM 4500 MG: 4; .5 INJECTION, POWDER, LYOPHILIZED, FOR SOLUTION INTRAVENOUS at 08:37

## 2025-03-16 RX ADMIN — LINEZOLID 600 MG: 600 TABLET, FILM COATED ORAL at 08:38

## 2025-03-16 RX ADMIN — INSULIN LISPRO 9 UNITS: 100 INJECTION, SOLUTION INTRAVENOUS; SUBCUTANEOUS at 08:37

## 2025-03-16 RX ADMIN — PIPERACILLIN AND TAZOBACTAM 4500 MG: 4; .5 INJECTION, POWDER, LYOPHILIZED, FOR SOLUTION INTRAVENOUS at 01:42

## 2025-03-16 RX ADMIN — METHOCARBAMOL 1000 MG: 500 TABLET ORAL at 13:35

## 2025-03-16 RX ADMIN — OXYCODONE HYDROCHLORIDE AND ACETAMINOPHEN 1 TABLET: 5; 325 TABLET ORAL at 08:37

## 2025-03-16 RX ADMIN — INSULIN LISPRO 9 UNITS: 100 INJECTION, SOLUTION INTRAVENOUS; SUBCUTANEOUS at 16:50

## 2025-03-16 RX ADMIN — GUAIFENESIN 600 MG: 600 TABLET ORAL at 21:29

## 2025-03-16 ASSESSMENT — PAIN DESCRIPTION - LOCATION
LOCATION: RIB CAGE;ABDOMEN
LOCATION: ABDOMEN;FLANK
LOCATION: ABDOMEN;FLANK

## 2025-03-16 ASSESSMENT — PAIN SCALES - GENERAL
PAINLEVEL_OUTOF10: 8
PAINLEVEL_OUTOF10: 10
PAINLEVEL_OUTOF10: 3
PAINLEVEL_OUTOF10: 8
PAINLEVEL_OUTOF10: 8

## 2025-03-16 ASSESSMENT — PAIN DESCRIPTION - DESCRIPTORS
DESCRIPTORS: ACHING;DISCOMFORT;NAGGING
DESCRIPTORS: ACHING;DISCOMFORT;NAGGING

## 2025-03-16 ASSESSMENT — PAIN DESCRIPTION - ORIENTATION
ORIENTATION: LEFT
ORIENTATION: LEFT

## 2025-03-16 NOTE — PLAN OF CARE
Problem: Discharge Planning  Goal: Discharge to home or other facility with appropriate resources  3/16/2025 0949 by Carlitos Barney RN  Outcome: Progressing  Flowsheets (Taken 3/16/2025 0837)  Discharge to home or other facility with appropriate resources:   Identify barriers to discharge with patient and caregiver   Arrange for needed discharge resources and transportation as appropriate  3/16/2025 0104 by Jazmyn Mckeon RN  Outcome: HH/HSPC Not Progressing  Flowsheets (Taken 3/15/2025 2019)  Discharge to home or other facility with appropriate resources:   Identify barriers to discharge with patient and caregiver   Identify discharge learning needs (meds, wound care, etc)     Problem: Pain  Goal: Verbalizes/displays adequate comfort level or baseline comfort level  3/16/2025 0949 by Carlitos Barney RN  Outcome: Progressing  3/16/2025 0104 by Jazmyn Mckeon RN  Outcome: HH/HSPC Not Progressing     Problem: Respiratory - Adult  Goal: Achieves optimal ventilation and oxygenation  3/16/2025 0949 by Carlitos Barney RN  Outcome: Progressing  3/16/2025 0104 by Jazmyn Mckeon RN  Outcome: HH/[facility]SPC Not Progressing  Flowsheets (Taken 3/15/2025 2019)  Achieves optimal ventilation and oxygenation:   Assess for changes in respiratory status   Assess for changes in mentation and behavior   Position to facilitate oxygenation and minimize respiratory effort   Oxygen supplementation based on oxygen saturation or arterial blood gases   Assess and instruct to report shortness of breath or any respiratory difficulty   Respiratory therapy support as indicated     Problem: Skin/Tissue Integrity - Adult  Goal: Skin integrity remains intact  3/16/2025 0949 by Carlitos Barney RN  Outcome: Progressing  3/16/2025 0104 by Jazmyn Mckeon RN  Outcome: HH/HSPC Not Progressing  Flowsheets  Taken 3/15/2025 2202  Skin Integrity Remains Intact: Monitor for areas of redness and/or skin breakdown  Taken 3/15/2025 2019  Skin Integrity Remains Intact:  levels  Description: INTERVENTIONS:  1. Administer medication as ordered  2. Teach and rehearse alternative coping skills  3. Provide emotional support with 1:1 interaction with staff  3/16/2025 0949 by Carlitos Barney RN  Outcome: Progressing  3/16/2025 0104 by Jazmyn Mckeon RN  Outcome: [facility] Not Progressing  Flowsheets (Taken 3/15/2025 2019)  Will report anxiety at manageable levels:   Administer medication as ordered   Teach and rehearse alternative coping skills   Provide emotional support with 1:1 interaction with staff     Problem: Coping  Goal: Pt/Family able to verbalize concerns and demonstrate effective coping strategies  Description: INTERVENTIONS:  1. Assist patient/family to identify coping skills, available support systems and cultural and spiritual values  2. Provide emotional support, including active listening and acknowledgement of concerns of patient and caregivers  3. Reduce environmental stimuli, as able  4. Instruct patient/family in relaxation techniques, as appropriate  5. Assess for spiritual pain/suffering and initiate Spiritual Care, Psychosocial Clinical Specialist consults as needed  3/16/2025 0949 by Carlitos Barney RN  Outcome: Progressing  3/16/2025 0104 by Jazmyn Mckeon RN  Outcome: [facility] Not Progressing  Flowsheets (Taken 3/15/2025 2019)  Patient/family able to verbalize anxieties, fears, and concerns, and demonstrate effective coping:   Assist patient/family to identify coping skills, available support systems and cultural and spiritual values   Provide emotional support, including active listening and acknowledgement of concerns of patient and caregivers   Reduce environmental stimuli, as able   Instruct patient/family in relaxation techniques, as appropriate     Problem: Behavior  Goal: Pt/Family maintain appropriate behavior and adhere to behavioral management agreement, if implemented  Description: INTERVENTIONS:  1. Assess patient/family's coping skills and  non-compliant  behavior (including use of illegal substances)  2. Notify security of behavior or suspected illegal substances which indicate the need for search of the family and/or belongings  3. Encourage verbalization of thoughts and concerns in a socially appropriate manner  4. Utilize positive, consistent limit setting strategies supporting safety of patient, staff and others  5. Encourage participation in the decision making process about the behavioral management agreement  6. If a visitor's behavior poses a threat to safety call refer to organization policy.  7. Initiate consult with , Psychosocial CNS, Spiritual Care as appropriate  3/16/2025 0949 by Carlitos Barney RN  Outcome: Progressing  3/16/2025 0104 by Jazmyn Mckeon RN  Outcome: /[facility] Not Progressing  Flowsheets (Taken 3/15/2025 2019)  Patient/family maintains appropriate behavior and adheres to behavioral management agreement, if implemented:   Assess patient/family’s coping skills and  non-compliant behavior (including use of illegal substances)   Notify security of behavior or suspected illegal substances which indicate the need for search of the patient and/or belongings   Encourage verbalization of thoughts and concerns in a socially appropriate manner   Utilize positive, consistent limit setting strategies supporting safety of patient, staff and others   Encourage participation in the decision making process about the behavioral management agreement     Problem: Safety - Adult  Goal: Free from fall injury  3/16/2025 0949 by Carlitos Barney RN  Outcome: Progressing  3/16/2025 0104 by Jazmyn Mckeon RN  Outcome: /[facility] Not Progressing

## 2025-03-16 NOTE — PLAN OF CARE
Problem: Discharge Planning  Goal: Discharge to home or other facility with appropriate resources  Outcome: HH/[facility]C Not Progressing  Flowsheets (Taken 3/15/2025 2019)  Discharge to home or other facility with appropriate resources:   Identify barriers to discharge with patient and caregiver   Identify discharge learning needs (meds, wound care, etc)     Problem: Pain  Goal: Verbalizes/displays adequate comfort level or baseline comfort level  Outcome: HH/[facility]C Not Progressing     Problem: Respiratory - Adult  Goal: Achieves optimal ventilation and oxygenation  Outcome: HH/[facility]C Not Progressing  Flowsheets (Taken 3/15/2025 2019)  Achieves optimal ventilation and oxygenation:   Assess for changes in respiratory status   Assess for changes in mentation and behavior   Position to facilitate oxygenation and minimize respiratory effort   Oxygen supplementation based on oxygen saturation or arterial blood gases   Assess and instruct to report shortness of breath or any respiratory difficulty   Respiratory therapy support as indicated     Problem: Skin/Tissue Integrity - Adult  Goal: Skin integrity remains intact  Outcome: HH/[facility]SPC Not Progressing  Flowsheets  Taken 3/15/2025 2202  Skin Integrity Remains Intact: Monitor for areas of redness and/or skin breakdown  Taken 3/15/2025 2019  Skin Integrity Remains Intact: Monitor for areas of redness and/or skin breakdown     Problem: Gastrointestinal - Adult  Goal: Minimal or absence of nausea and vomiting  Outcome: HH/[facility]C Not Progressing  Flowsheets (Taken 3/15/2025 2019)  Minimal or absence of nausea and vomiting:   Administer IV fluids as ordered to ensure adequate hydration   Administer ordered antiemetic medications as needed   Provide nonpharmacologic comfort measures as appropriate     Problem: Genitourinary - Adult  Goal: Absence of urinary retention  Outcome: HH/[facility]C Not Progressing  Flowsheets (Taken 3/15/2025 2019)  Absence of urinary retention:   Assess patient’s  acknowledgement of concerns of patient and caregivers   Reduce environmental stimuli, as able   Instruct patient/family in relaxation techniques, as appropriate     Problem: Behavior  Goal: Pt/Family maintain appropriate behavior and adhere to behavioral management agreement, if implemented  Description: INTERVENTIONS:  1. Assess patient/family's coping skills and  non-compliant behavior (including use of illegal substances)  2. Notify security of behavior or suspected illegal substances which indicate the need for search of the family and/or belongings  3. Encourage verbalization of thoughts and concerns in a socially appropriate manner  4. Utilize positive, consistent limit setting strategies supporting safety of patient, staff and others  5. Encourage participation in the decision making process about the behavioral management agreement  6. If a visitor's behavior poses a threat to safety call refer to organization policy.  7. Initiate consult with , Psychosocial CNS, Spiritual Care as appropriate  Outcome: /[facility] Not Progressing  Flowsheets (Taken 3/15/2025 2019)  Patient/family maintains appropriate behavior and adheres to behavioral management agreement, if implemented:   Assess patient/family’s coping skills and  non-compliant behavior (including use of illegal substances)   Notify security of behavior or suspected illegal substances which indicate the need for search of the patient and/or belongings   Encourage verbalization of thoughts and concerns in a socially appropriate manner   Utilize positive, consistent limit setting strategies supporting safety of patient, staff and others   Encourage participation in the decision making process about the behavioral management agreement     Problem: Safety - Adult  Goal: Free from fall injury  Outcome: /[facility] Not Progressing

## 2025-03-17 ENCOUNTER — APPOINTMENT (OUTPATIENT)
Dept: GENERAL RADIOLOGY | Age: 45
DRG: 193 | End: 2025-03-17
Payer: COMMERCIAL

## 2025-03-17 PROBLEM — J18.9 CAVITARY PNEUMONIA: Status: ACTIVE | Noted: 2025-03-17

## 2025-03-17 PROBLEM — J90 PLEURAL EFFUSION: Status: ACTIVE | Noted: 2025-03-17

## 2025-03-17 PROBLEM — J98.4 CAVITARY PNEUMONIA: Status: ACTIVE | Noted: 2025-03-17

## 2025-03-17 LAB
ANION GAP SERPL CALCULATED.3IONS-SCNC: 11 MMOL/L (ref 3–16)
BASOPHILS # BLD: 0.1 K/UL (ref 0–0.2)
BASOPHILS NFR BLD: 1.4 %
BUN SERPL-MCNC: 8 MG/DL (ref 7–20)
CALCIUM SERPL-MCNC: 8.9 MG/DL (ref 8.3–10.6)
CHLORIDE SERPL-SCNC: 101 MMOL/L (ref 99–110)
CO2 SERPL-SCNC: 23 MMOL/L (ref 21–32)
CREAT SERPL-MCNC: 0.5 MG/DL (ref 0.6–1.1)
DEPRECATED RDW RBC AUTO: 11.7 % (ref 12.4–15.4)
EOSINOPHIL # BLD: 0.1 K/UL (ref 0–0.6)
EOSINOPHIL NFR BLD: 0.6 %
GFR SERPLBLD CREATININE-BSD FMLA CKD-EPI: >90 ML/MIN/{1.73_M2}
GLUCOSE BLD-MCNC: 144 MG/DL (ref 70–99)
GLUCOSE BLD-MCNC: 176 MG/DL (ref 70–99)
GLUCOSE BLD-MCNC: 193 MG/DL (ref 70–99)
GLUCOSE BLD-MCNC: 240 MG/DL (ref 70–99)
GLUCOSE SERPL-MCNC: 140 MG/DL (ref 70–99)
HCT VFR BLD AUTO: 36.8 % (ref 36–48)
HGB BLD-MCNC: 12.7 G/DL (ref 12–16)
LYMPHOCYTES # BLD: 1.9 K/UL (ref 1–5.1)
LYMPHOCYTES NFR BLD: 20.9 %
MCH RBC QN AUTO: 32.7 PG (ref 26–34)
MCHC RBC AUTO-ENTMCNC: 34.4 G/DL (ref 31–36)
MCV RBC AUTO: 95 FL (ref 80–100)
MONOCYTES # BLD: 0.5 K/UL (ref 0–1.3)
MONOCYTES NFR BLD: 5.2 %
NEUTROPHILS # BLD: 6.6 K/UL (ref 1.7–7.7)
NEUTROPHILS NFR BLD: 71.9 %
PERFORMED ON: ABNORMAL
PLATELET # BLD AUTO: 399 K/UL (ref 135–450)
PMV BLD AUTO: 6.8 FL (ref 5–10.5)
POTASSIUM SERPL-SCNC: 3.8 MMOL/L (ref 3.5–5.1)
RBC # BLD AUTO: 3.87 M/UL (ref 4–5.2)
SODIUM SERPL-SCNC: 135 MMOL/L (ref 136–145)
WBC # BLD AUTO: 9.1 K/UL (ref 4–11)

## 2025-03-17 PROCEDURE — 94760 N-INVAS EAR/PLS OXIMETRY 1: CPT

## 2025-03-17 PROCEDURE — 2580000003 HC RX 258: Performed by: INTERNAL MEDICINE

## 2025-03-17 PROCEDURE — 6370000000 HC RX 637 (ALT 250 FOR IP): Performed by: HOSPITALIST

## 2025-03-17 PROCEDURE — 6370000000 HC RX 637 (ALT 250 FOR IP): Performed by: INTERNAL MEDICINE

## 2025-03-17 PROCEDURE — 85025 COMPLETE CBC W/AUTO DIFF WBC: CPT

## 2025-03-17 PROCEDURE — 6370000000 HC RX 637 (ALT 250 FOR IP): Performed by: STUDENT IN AN ORGANIZED HEALTH CARE EDUCATION/TRAINING PROGRAM

## 2025-03-17 PROCEDURE — 36415 COLL VENOUS BLD VENIPUNCTURE: CPT

## 2025-03-17 PROCEDURE — 2500000003 HC RX 250 WO HCPCS: Performed by: HOSPITALIST

## 2025-03-17 PROCEDURE — 99231 SBSQ HOSP IP/OBS SF/LOW 25: CPT | Performed by: NURSE PRACTITIONER

## 2025-03-17 PROCEDURE — 6360000002 HC RX W HCPCS: Performed by: HOSPITALIST

## 2025-03-17 PROCEDURE — 1200000000 HC SEMI PRIVATE

## 2025-03-17 PROCEDURE — 6360000002 HC RX W HCPCS: Performed by: INTERNAL MEDICINE

## 2025-03-17 PROCEDURE — 99232 SBSQ HOSP IP/OBS MODERATE 35: CPT | Performed by: INTERNAL MEDICINE

## 2025-03-17 PROCEDURE — 80048 BASIC METABOLIC PNL TOTAL CA: CPT

## 2025-03-17 PROCEDURE — 2580000003 HC RX 258: Performed by: STUDENT IN AN ORGANIZED HEALTH CARE EDUCATION/TRAINING PROGRAM

## 2025-03-17 RX ORDER — INSULIN LISPRO 100 [IU]/ML
11 INJECTION, SOLUTION INTRAVENOUS; SUBCUTANEOUS
Status: COMPLETED | OUTPATIENT
Start: 2025-03-17 | End: 2025-03-17

## 2025-03-17 RX ORDER — INSULIN LISPRO 100 [IU]/ML
11 INJECTION, SOLUTION INTRAVENOUS; SUBCUTANEOUS
Status: DISCONTINUED | OUTPATIENT
Start: 2025-03-17 | End: 2025-03-18 | Stop reason: HOSPADM

## 2025-03-17 RX ADMIN — PIPERACILLIN AND TAZOBACTAM 4500 MG: 4; .5 INJECTION, POWDER, LYOPHILIZED, FOR SOLUTION INTRAVENOUS at 09:10

## 2025-03-17 RX ADMIN — INSULIN LISPRO 2 UNITS: 100 INJECTION, SOLUTION INTRAVENOUS; SUBCUTANEOUS at 21:08

## 2025-03-17 RX ADMIN — INSULIN LISPRO 2 UNITS: 100 INJECTION, SOLUTION INTRAVENOUS; SUBCUTANEOUS at 17:40

## 2025-03-17 RX ADMIN — INSULIN LISPRO 11 UNITS: 100 INJECTION, SOLUTION INTRAVENOUS; SUBCUTANEOUS at 09:04

## 2025-03-17 RX ADMIN — SODIUM CHLORIDE: 0.9 INJECTION, SOLUTION INTRAVENOUS at 09:08

## 2025-03-17 RX ADMIN — METHOCARBAMOL 1000 MG: 500 TABLET ORAL at 12:34

## 2025-03-17 RX ADMIN — GUAIFENESIN 600 MG: 600 TABLET ORAL at 21:08

## 2025-03-17 RX ADMIN — ENOXAPARIN SODIUM 30 MG: 100 INJECTION SUBCUTANEOUS at 09:04

## 2025-03-17 RX ADMIN — ENOXAPARIN SODIUM 30 MG: 100 INJECTION SUBCUTANEOUS at 21:08

## 2025-03-17 RX ADMIN — PIPERACILLIN AND TAZOBACTAM 4500 MG: 4; .5 INJECTION, POWDER, LYOPHILIZED, FOR SOLUTION INTRAVENOUS at 01:35

## 2025-03-17 RX ADMIN — OXYCODONE HYDROCHLORIDE AND ACETAMINOPHEN 1 TABLET: 5; 325 TABLET ORAL at 06:03

## 2025-03-17 RX ADMIN — LINEZOLID 600 MG: 600 TABLET, FILM COATED ORAL at 09:01

## 2025-03-17 RX ADMIN — OXYCODONE HYDROCHLORIDE AND ACETAMINOPHEN 1 TABLET: 5; 325 TABLET ORAL at 12:39

## 2025-03-17 RX ADMIN — METHOCARBAMOL 1000 MG: 500 TABLET ORAL at 17:39

## 2025-03-17 RX ADMIN — INSULIN GLARGINE 28 UNITS: 100 INJECTION, SOLUTION SUBCUTANEOUS at 21:09

## 2025-03-17 RX ADMIN — GUAIFENESIN 600 MG: 600 TABLET ORAL at 09:01

## 2025-03-17 RX ADMIN — SODIUM CHLORIDE, PRESERVATIVE FREE 10 ML: 5 INJECTION INTRAVENOUS at 21:10

## 2025-03-17 RX ADMIN — METHOCARBAMOL 1000 MG: 500 TABLET ORAL at 09:00

## 2025-03-17 RX ADMIN — PIPERACILLIN AND TAZOBACTAM 4500 MG: 4; .5 INJECTION, POWDER, LYOPHILIZED, FOR SOLUTION INTRAVENOUS at 17:44

## 2025-03-17 RX ADMIN — INSULIN LISPRO 11 UNITS: 100 INJECTION, SOLUTION INTRAVENOUS; SUBCUTANEOUS at 17:39

## 2025-03-17 RX ADMIN — OXYCODONE HYDROCHLORIDE AND ACETAMINOPHEN 1 TABLET: 5; 325 TABLET ORAL at 21:07

## 2025-03-17 RX ADMIN — PREDNISONE 20 MG: 20 TABLET ORAL at 09:01

## 2025-03-17 RX ADMIN — INSULIN LISPRO 11 UNITS: 100 INJECTION, SOLUTION INTRAVENOUS; SUBCUTANEOUS at 12:34

## 2025-03-17 RX ADMIN — LINEZOLID 600 MG: 600 TABLET, FILM COATED ORAL at 21:08

## 2025-03-17 RX ADMIN — METHOCARBAMOL 1000 MG: 500 TABLET ORAL at 21:08

## 2025-03-17 RX ADMIN — SODIUM CHLORIDE: 0.9 INJECTION, SOLUTION INTRAVENOUS at 17:43

## 2025-03-17 ASSESSMENT — PAIN DESCRIPTION - ORIENTATION
ORIENTATION: LEFT
ORIENTATION: LEFT

## 2025-03-17 ASSESSMENT — PAIN SCALES - GENERAL
PAINLEVEL_OUTOF10: 7
PAINLEVEL_OUTOF10: 7

## 2025-03-17 ASSESSMENT — PAIN DESCRIPTION - DESCRIPTORS
DESCRIPTORS: ACHING;DISCOMFORT
DESCRIPTORS: ACHING;DISCOMFORT;NAGGING

## 2025-03-17 ASSESSMENT — PAIN DESCRIPTION - LOCATION
LOCATION: ABDOMEN;FLANK
LOCATION: ABDOMEN;FLANK

## 2025-03-17 NOTE — PROGRESS NOTES
Infectious Disease Follow up Notes  Admit Date: 3/7/2025  Hospital Day: 11    Antibiotics :  IV ZOSYN  IV Linezolid     CHIEF COMPLAINT:     Pleuritic pain  Left side pneumonia  Multiple cavitary nodules  Immunosuppressed  Diabetes  Morbid obesity  Chronic smoking  Night sweats  Left pleural effusion    Subjective interval History :  44 y.o.female  with history of plaque psoriasis managed by  Dermatology w topical steroid and risankizumab given q3 months, last in 11/2024  Last Quantiferon available for review was in 10/2023 was negative   She is now admitted to hospital secondary to left-sided pain associate with cough sweats and chills.  She also like a recent diabetes history of chronic smoking her main symptom is left-sided pleuritic pain with difficulty breathing CT chest indicating multiple cavitary lesions concern for infection    Left-sided chest pain slowly improving blood sugar slowly getting better WBC normal remains on room air pleural fluid cytology negative, urine histoplasma antigen negative, fungal serologies negative        past Medical History:    Past Medical History:   Diagnosis Date    Asthma     Fatty liver     Obesity     Psoriasis     Psoriasis     Type 2 diabetes mellitus (HCC)        Past Surgical History:    Past Surgical History:   Procedure Laterality Date    CHOLECYSTECTOMY         Current Medications:    Outpatient Medications Marked as Taking for the 3/7/25 encounter (Hospital Encounter)   Medication Sig Dispense Refill    blood glucose monitor kit and supplies Dispense sufficient amount for indicated testing frequency plus additional to accommodate PRN testing needs. Dispense all needed supplies to include: monitor, strips, lancing device, lancets, control solutions, alcohol swabs. 1 kit 0    Lancets MISC 1 each by Does not apply route 4 times daily (before meals and nightly) 120 each 3    blood glucose  Clean Catch                  COLLECTED:  03/08/25 12:19  ANTIBIOTICS AT JERROD.:                      RECEIVED :  03/08/25 12:41Legionella antigen, urine [[id number]]Collected: 03/08/25 1219Order Status: CompletedSpecimen: Urine, clean catchUpdated: 03/09/25 1004L. pneumophila Serogp 1 Ur Ag--Presumptive Negative  No Legionella pneumophila serogroup 1 antigens detected.  A negative result does not exclude infection with  Legionella pneumophila serogroup 1 nor does it rule out  other microbial-caused respiratory infections or  disease caused by other serogroups of  Legionella pneumophila.  Normal Range: Presumptive Negative  Narrative:  ORDER#: [id number]                          ORDERED BY: EDY CHAVARRIA  SOURCE: Urine Clean Catch                  COLLECTED:  03/08/25 12:19  ANTIBIOTICS AT JERROD.:                      RECEIVED :  03/08/25 12:41Culture, Blood 1 [[id number]]Collected: 03/07/25 1754Order Status: CompletedSpecimen: BloodUpdated: 03/08/25 1915Blood Culture, RoutineNo Growth to date.  Any change in status will be called.Narrative:  ORDER#: [id number]                          ORDERED BY: ODALYS LEDBETTER  SOURCE: Blood                              COLLECTED:  03/07/25 17:54  ANTIBIOTICS AT JERROD.:                      RECEIVED :  03/07/25 18:14  If child <=2 yrs old please draw pediatric bottle.~Blood Culture 1Culture, Blood 2 [[id number]]Collected: 03/07/25 1758Order Status: CompletedSpecimen: BloodUpdated: 03/08/25 1915Culture, Blood 2No Growth to date.  Any change in status will be called.Narrative:  ORDER#: [id number]                          ORDERED BY: ODALYS LEDBETTER  SOURCE: Blood                              COLLECTED:  03/07/25 17:58  ANTIBIOTICS AT JERROD.:                      RECEIVED :  03/07/25 18:14  If child <=2 yrs old please draw pediatric bottle.~Blood Culture #2MRSA DNA Probe, Nasal [[id number]] (Abnormal)Collected: 03/08/25 1219Order Status: CompletedSpecimen: NaresUpdated: 03/08/25  effusion  Pleural fluid cytology Negative  Beta D glucan positive +Ve  Histo -ve , blastomyces  -ve.Fungal Antibodies  -ve  CXR in AM if improving ok for d/c on oral abx  Home on oral Augmentin x  875 mg q 12 hr x 10 days + Linezolid x  600 mg Q 12 HR X 10 DAYS           Discussed with patient/Family and Nursing staff     Medical Decision Making:  The following items were considered in medical decision making:  Discussion of patient care with other providers  Reviewed clinical lab tests  Reviewed radiology tests  Reviewed other diagnostic tests/interventions  Independent review of radiologic images  Microbiology cultures and other micro tests reviewed     Risk of Complications/Morbidity: High      Illness(es)/ Infection present that pose threat to bodily function.   There is potential for severe exacerbation of infection/side effects of treatment.  Therapy requires intensive monitoring for antimicrobial agent toxicity.  Review of Antibiotic resistance patterns and lab results  Management decisions including changes in Antimicrobial therapy and infection control strategies  Coordination with Micro lab, public health agencies or interdisciplinary teams      Thanks for allowing me to participate in your patient's care and please call me with any questions or concerns.    Dr.Ravindhar David GARNETT  Infectious Disease  [facility] Physician  Phone: [phone]   Fax : [phone]

## 2025-03-17 NOTE — PROGRESS NOTES
[facility]  Glycemic Control      NAME: Stacie Donato  MEDICAL RECORD NUMBER:  [id number]  AGE: 44 y.o.   GENDER: female  : 1980  EPISODE DATE:  3/17/2025     Data     Recent Labs     03/15/25  1952 25  0738 25  1154 25  1640 25  2118 25  0814   POCGLU 213* 144* 200* 237* 235* 144*       HgBA1c:    Lab Results   Component Value Date/Time    LABA1C 10.3 2025 12:07 PM       BUN/Creatinine:    Lab Results   Component Value Date/Time    BUN 8 2025 04:14 AM    CREATININE 0.5 2025 04:14 AM      Latest Reference Range & Units 25 04:14   Est, Glom Filt Rate >60  >90     FIB-4 Calculation: 0.7 at 3/17/2025  4:14 AM  Calculated from:  SGOT/AST: 41 U/L at 3/7/2025 11:24 AM  SGPT/ALT: 38 U/L at 3/7/2025 11:24 AM  Platelets: 399 K/uL at 3/17/2025  4:14 AM  Age: 44 years    Medications  Scheduled Medications:   insulin lispro  11 Units SubCUTAneous TID WC    insulin glargine  0.25 Units/kg SubCUTAneous Nightly    sodium chloride flush  5-40 mL IntraVENous 2 times per day    lidocaine 1 % injection  50 mg IntraDERmal Once    linezolid  600 mg Oral 2 times per day    piperacillin-tazobactam  4,500 mg IntraVENous Q8H    methocarbamol  1,000 mg Oral 4x Daily    guaiFENesin  600 mg Oral BID    sennosides-docusate sodium  2 tablet Oral Daily    insulin lispro  0-8 Units SubCUTAneous 4x Daily AC & HS    sodium chloride flush  5-40 mL IntraVENous 2 times per day    enoxaparin  30 mg SubCUTAneous BID       Diet  Current diet/supplement order: ADULT DIET; Regular; 4 carb choices (60 gm/meal)     Recorded PO: PO Meals Eaten (%): 76 - 100% last meal in flowsheets      Action      Pt asked questions related to waking up, medication, and meal planning. [facility] called pt- call unable to be completed due to medication at D/C being asked and information not available, call ended.      Reviewed insulin pen and answers all present questions.      Glucose Target: 140-180, avoid hypoglycemia     Total Daily Insulin:  3/17/2025: 11 units (as of 0917am)  3/16/2025: 61 units + oral steroid      Recommendation(s):Increase Prandial (consider 11-13 units)  (Basal weight based dosin.35 units, Prandial weight based dosin.45 units TID with meals)      DM Needs at D/C:  - Insulin pens (Basal/Bolus- set prandial) (simple plan) Needs Order  - Insulin Pen needles (4mm) Needs Order  - Glucometer and testing Supplies Order sent to [facility]  - Tg meds/Meds to bed (SW notified)  - [facility]  Pharmacy Referral Faxed twice 3/12/2025 at 1354pm  - [facility] referral  - [facility] Appt 3/24/2025 at 9:00am    Physician Notified of event: Yes, David Dasilva, DO 3/17/2025 at 0812am, spoke with nursing regarding updates with regimen.       Medication plan updated: Yes      Electronically signed by Laina Sui RN on 3/17/2025 at 9:17 AM

## 2025-03-17 NOTE — PROGRESS NOTES
Pulmonary/Critical Care Progress Note    CC:  Follow-up:  Cavitary lung nodules  Parapneumonic effusion  Pleurisy    Subjective:  Remains on room air  Chest discomfort a bit better  Congested non-productive cough today  Feeling a bit better    ROS  No SOB  Congested cough  No fever or chills    Intake/Output Summary (Last 24 hours) at 3/17/2025 1204  Last data filed at 3/17/2025 1026  Gross per 24 hour   Intake 1684.71 ml   Output --   Net 1684.71 ml         PHYSICAL EXAM:  Blood pressure 133/76, pulse 72, temperature 97.9 °F (36.6 °C), temperature source Oral, resp. rate 16, height 1.524 m (5'), weight 113.4 kg (250 lb), SpO2 93%, not currently breastfeeding.'  Gen: No distress. Well developed, well-nourished  Eyes: No scleral icterus. No conjunctival injection.   ENT: External appearance of ears and nose normal.  Neck: Trachea midline. No obvious mass. No visible thyroid enlargement     Respiratory: No crackles. No wheezes. No rhonchi. No accessory muscle use  Cardiovascular: Regular rate. Regular rhythm. No murmur or rub.  No edema  GI: Non-tender. Non-distended. No hernia. Bowel sounds present  Skin: Warm, dry, normal texture and turgor. No abnormalities on exposed extremities.   Musculoskeletal: No cyanosis. No clubbing. No joint deformity.    Neuro: Moves all four extremities.   Psychiatric:  Normal mood and affect; exhibits normal insight and judgement     Medications:    Scheduled Meds:   insulin lispro  11 Units SubCUTAneous TID WC    insulin glargine  0.25 Units/kg SubCUTAneous Nightly    sodium chloride flush  5-40 mL IntraVENous 2 times per day    lidocaine 1 % injection  50 mg IntraDERmal Once    linezolid  600 mg Oral 2 times per day    piperacillin-tazobactam  4,500 mg IntraVENous Q8H    methocarbamol  1,000 mg Oral 4x Daily    guaiFENesin  600 mg Oral BID    sennosides-docusate sodium  2 tablet Oral Daily    insulin lispro  0-8 Units SubCUTAneous 4x Daily AC & HS    sodium chloride flush  5-40 mL

## 2025-03-17 NOTE — PROGRESS NOTES
Comprehensive Nutrition Assessment    Type and Reason for Visit:  Initial, LOS    Nutrition Recommendations/Plan:   Continue CCC (4) Diet  Encouraged pt to contact Dietitian should any questions arise regarding diet     Malnutrition Assessment:  Malnutrition Status:  No malnutrition (03/17/25 1546)    Context:  Acute Illness     Findings of the 6 clinical characteristics of malnutrition:  Energy Intake:  No decrease in energy intake  Weight Loss:  No weight loss     Body Fat Loss:  No body fat loss     Muscle Mass Loss:  No muscle mass loss    Fluid Accumulation:  No fluid accumulation     Strength:  Not Performed    Nutrition Assessment:    Assessment completed per LOS protocol. PMH includes DM. Pt adm and found to have Cavitary Lung Nodules, Parapneumonic Effusion and Pleurisy. Diet adv to CCC (4). Pt reported and records confirm good po intake. Pt declined ed needs. Wt records reviewed, with no significant change noted. Current nutrition regimen appropriate at this time. Will continue to follow progress.    Nutrition Related Findings:    -237 improved. Steroids on board with Lantus and coverage to help manage BS. Noted A1C on 3/8/25 at 10.3. Noted BM on 3/15. Noted no edema. Wound Type: None       Current Nutrition Intake & Therapies:    Average Meal Intake: 51-75%, %     ADULT DIET; Regular; 4 carb choices (60 gm/meal)    Anthropometric Measures:  Height: 152.4 cm (5')  Ideal Body Weight (IBW): 100 lbs (45 kg)    Admission Body Weight: 112.5 kg (248 lb)  Current Body Weight: 113.4 kg (250 lb),   IBW. Weight Source: Bed scale  Current BMI (kg/m2): 48.8                             BMI Categories: Obese Class 3 (BMI 40.0 or greater)        Nutrition Diagnosis:   No nutrition diagnosis at this time     Nutrition Interventions:   Food and/or Nutrient Delivery: Continue Current Diet  Nutrition Education/Counseling: Education/Counseling declined  Coordination of Nutrition Care: Continue to monitor while  inpatient       Goals:  Goals: PO intake 75% or greater  Type of Goal: New goal       Nutrition Monitoring and Evaluation:   Behavioral-Environmental Outcomes: None Identified  Food/Nutrient Intake Outcomes: Food and Nutrient Intake  Physical Signs/Symptoms Outcomes: Biochemical Data, Constipation, Diarrhea, Fluid Status or Edema, Skin, Weight    Discharge Planning:    Continue current diet     Javier Altamirano RD, LD  Contact: [phone]

## 2025-03-17 NOTE — PROGRESS NOTES
V2.0    [facility] [place]      Name:  Stacie Donato /Age/Sex: 1980  (44 y.o. female)   MRN & CSN:  [id number] & [id number] Encounter Date/Time: 3/17/2025 8:35 AM EDT   Location:  [id number] PCP: No primary care provider on file.     Attending:David Dasilva DO       Hospital Day: 11  Brief Hospital Course  Stacie Donato is a 44 y.o. female with pertinent PMHx of T2DM who presented complaining of pleuritic chest pain.  She was admitted with left-sided pleural effusion.  Assessment & Plan     Bilateral pulmonary nodules with cavitation  Pleuritic chest pain  Left-sided pleural effusion  -Continue antibiotic coverage with oral linezolid and IV Zosyn, patient does not have insurance therefore cannot get outpatient IV antibiotics, will need to clarify antibiotic course with ID  -Completed 5-day course of prednisone for pleuritic pain  -Pneumonia panel and respiratory culture positive for strep agalactiae, MRSA nares positive  -S/p paracentesis 3/12 with 250 mL removed, appears parapneumonic  -Infectious diseases following  -Pulmonology following    Type 2 diabetes  -Continue Lantus 28 units nightly  -Increase lispro to 11 units 3 times daily with meals  -Continue medium dose sliding scale correctional insulin 4 times a day with meals and nightly  -Check point-of-care glucose 4 times daily with meals and nightly to monitor for hypoglycemia from insulin toxicity    Hyponatremia, resolved  -S/p fluid resuscitation  -Check BMP tomorrow    Vulvovaginal candidiasis  -Diflucan 150 mg once 3/16    Diet ADULT DIET; Regular; 4 carb choices (60 gm/meal)   DVT Prophylaxis [x] Lovenox, []  Heparin, [] SCDs, [] Ambulation,  [] Eliquis, [] Xarelto  [] Coumadin   Code Status Full Code   Disposition From: Home  Expected Disposition: Home  Estimated Date of Discharge:            Subjective:     Chief Complaint: Chest pain    Patient was seen and examined today sitting up in bed  Says she is feeling better, still  PM    WBCUA 0-2 03/05/2025 12:19 PM    RBCUA None seen 03/05/2025 12:19 PM    TRICHOMONAS Present 03/05/2012 06:30 PM    BACTERIA 1+ 03/05/2025 12:19 PM    CLARITYU Clear 03/05/2025 12:19 PM    LEUKOCYTESUR Negative 03/05/2025 12:19 PM    UROBILINOGEN 1.0 03/05/2025 12:19 PM    BILIRUBINUR Negative 03/05/2025 12:19 PM    BLOODU Negative 03/05/2025 12:19 PM    GLUCOSEU >=1000 03/05/2025 12:19 PM    GLUCOSEU Negative 03/05/2012 06:50 PM    KETUA TRACE 03/05/2025 12:19 PM     Urine Cultures: No results found for: \"LABURIN\"  Blood Cultures:   Lab Results   Component Value Date/Time    BC No Growth after 4 days of incubation. 03/07/2025 05:54 PM     Lab Results   Component Value Date/Time    BLOODCULT2 No Growth after 4 days of incubation. 03/07/2025 05:58 PM     Organism:   Lab Results   Component Value Date/Time    ORG Strep agalactiae (Beta Strep Group B) 03/10/2025 06:16 PM    ORG Streptococcus agalactiae (BHS Gr B) DNA Detected 03/10/2025 06:16 PM         Electronically signed by David Dasilva DO on 3/17/2025 at 8:10 AM

## 2025-03-18 ENCOUNTER — APPOINTMENT (OUTPATIENT)
Dept: GENERAL RADIOLOGY | Age: 45
DRG: 193 | End: 2025-03-18
Payer: COMMERCIAL

## 2025-03-18 ENCOUNTER — APPOINTMENT (OUTPATIENT)
Dept: ULTRASOUND IMAGING | Age: 45
DRG: 193 | End: 2025-03-18
Payer: COMMERCIAL

## 2025-03-18 VITALS
HEART RATE: 72 BPM | DIASTOLIC BLOOD PRESSURE: 87 MMHG | RESPIRATION RATE: 18 BRPM | OXYGEN SATURATION: 96 % | SYSTOLIC BLOOD PRESSURE: 144 MMHG | HEIGHT: 60 IN | TEMPERATURE: 98.4 F | BODY MASS INDEX: 48.83 KG/M2 | WEIGHT: 248.7 LBS

## 2025-03-18 LAB
ANION GAP SERPL CALCULATED.3IONS-SCNC: 10 MMOL/L (ref 3–16)
APPEARANCE FLUID: NORMAL
BASOPHILS # BLD: 0.1 K/UL (ref 0–0.2)
BASOPHILS NFR BLD: 0.7 %
BDY FLUID QUALITY: NORMAL
BUN SERPL-MCNC: 7 MG/DL (ref 7–20)
CALCIUM SERPL-MCNC: 9 MG/DL (ref 8.3–10.6)
CELL COUNT FLUID TYPE: NORMAL
CHLORIDE SERPL-SCNC: 100 MMOL/L (ref 99–110)
CO2 SERPL-SCNC: 29 MMOL/L (ref 21–32)
COLOR FLUID: YELLOW
CREAT SERPL-MCNC: 0.6 MG/DL (ref 0.6–1.1)
DEPRECATED RDW RBC AUTO: 11.7 % (ref 12.4–15.4)
EOSINOPHIL # BLD: 0.1 K/UL (ref 0–0.6)
EOSINOPHIL NFR BLD: 1.3 %
GFR SERPLBLD CREATININE-BSD FMLA CKD-EPI: >90 ML/MIN/{1.73_M2}
GLUCOSE BLD-MCNC: 122 MG/DL (ref 70–99)
GLUCOSE BLD-MCNC: 149 MG/DL (ref 70–99)
GLUCOSE FLD-MCNC: 98 MG/DL
GLUCOSE SERPL-MCNC: 114 MG/DL (ref 70–99)
HCT VFR BLD AUTO: 33.9 % (ref 36–48)
HGB BLD-MCNC: 12 G/DL (ref 12–16)
LDH FLD L TO P-CCNC: 1825 U/L
LYMPHOCYTES # BLD: 2.1 K/UL (ref 1–5.1)
LYMPHOCYTES NFR BLD: 25.1 %
LYMPHOCYTES NFR FLD: 6 %
MACROPHAGES # FLD: 5 %
MCH RBC QN AUTO: 33.5 PG (ref 26–34)
MCHC RBC AUTO-ENTMCNC: 35.3 G/DL (ref 31–36)
MCV RBC AUTO: 95 FL (ref 80–100)
MONOCYTES # BLD: 0.5 K/UL (ref 0–1.3)
MONOCYTES NFR BLD: 5.4 %
MONOCYTES NFR FLD: 8 %
NEUTROPHIL, FLUID: 81 %
NEUTROPHILS # BLD: 5.8 K/UL (ref 1.7–7.7)
NEUTROPHILS NFR BLD: 67.5 %
NUC CELL # FLD: 885 /CUMM
PERFORMED ON: ABNORMAL
PERFORMED ON: ABNORMAL
PH FLD STRIP: 8 [PH]
PLATELET # BLD AUTO: 422 K/UL (ref 135–450)
PMV BLD AUTO: 6.4 FL (ref 5–10.5)
POTASSIUM SERPL-SCNC: 4.1 MMOL/L (ref 3.5–5.1)
PROT FLD-MCNC: 4.6 G/DL
RBC # BLD AUTO: 3.57 M/UL (ref 4–5.2)
RBC FLUID: 5000 /CUMM
SODIUM SERPL-SCNC: 139 MMOL/L (ref 136–145)
SPECIMEN SOURCE FLD: NORMAL
SPECIMEN VOL FLD: 150 ML
TOTAL CELLS COUNTED FLD: 100
WBC # BLD AUTO: 8.5 K/UL (ref 4–11)

## 2025-03-18 PROCEDURE — 83986 ASSAY PH BODY FLUID NOS: CPT

## 2025-03-18 PROCEDURE — 71045 X-RAY EXAM CHEST 1 VIEW: CPT

## 2025-03-18 PROCEDURE — 80048 BASIC METABOLIC PNL TOTAL CA: CPT

## 2025-03-18 PROCEDURE — 83615 LACTATE (LD) (LDH) ENZYME: CPT

## 2025-03-18 PROCEDURE — 87015 SPECIMEN INFECT AGNT CONCNTJ: CPT

## 2025-03-18 PROCEDURE — 6370000000 HC RX 637 (ALT 250 FOR IP): Performed by: STUDENT IN AN ORGANIZED HEALTH CARE EDUCATION/TRAINING PROGRAM

## 2025-03-18 PROCEDURE — 87070 CULTURE OTHR SPECIMN AEROBIC: CPT

## 2025-03-18 PROCEDURE — 32555 ASPIRATE PLEURA W/ IMAGING: CPT

## 2025-03-18 PROCEDURE — 71046 X-RAY EXAM CHEST 2 VIEWS: CPT

## 2025-03-18 PROCEDURE — 82945 GLUCOSE OTHER FLUID: CPT

## 2025-03-18 PROCEDURE — 6360000002 HC RX W HCPCS: Performed by: INTERNAL MEDICINE

## 2025-03-18 PROCEDURE — 84157 ASSAY OF PROTEIN OTHER: CPT

## 2025-03-18 PROCEDURE — 99233 SBSQ HOSP IP/OBS HIGH 50: CPT | Performed by: INTERNAL MEDICINE

## 2025-03-18 PROCEDURE — 2709999900 US THORACENTESIS

## 2025-03-18 PROCEDURE — 36415 COLL VENOUS BLD VENIPUNCTURE: CPT

## 2025-03-18 PROCEDURE — 87205 SMEAR GRAM STAIN: CPT

## 2025-03-18 PROCEDURE — 89051 BODY FLUID CELL COUNT: CPT

## 2025-03-18 PROCEDURE — 6370000000 HC RX 637 (ALT 250 FOR IP): Performed by: HOSPITALIST

## 2025-03-18 PROCEDURE — 2580000003 HC RX 258: Performed by: STUDENT IN AN ORGANIZED HEALTH CARE EDUCATION/TRAINING PROGRAM

## 2025-03-18 PROCEDURE — 6370000000 HC RX 637 (ALT 250 FOR IP): Performed by: INTERNAL MEDICINE

## 2025-03-18 PROCEDURE — 85025 COMPLETE CBC W/AUTO DIFF WBC: CPT

## 2025-03-18 PROCEDURE — 0W9B30Z DRAINAGE OF LEFT PLEURAL CAVITY WITH DRAINAGE DEVICE, PERCUTANEOUS APPROACH: ICD-10-PCS | Performed by: PSYCHIATRY & NEUROLOGY

## 2025-03-18 PROCEDURE — 87116 MYCOBACTERIA CULTURE: CPT

## 2025-03-18 PROCEDURE — 2580000003 HC RX 258: Performed by: INTERNAL MEDICINE

## 2025-03-18 PROCEDURE — 87102 FUNGUS ISOLATION CULTURE: CPT

## 2025-03-18 PROCEDURE — 87206 SMEAR FLUORESCENT/ACID STAI: CPT

## 2025-03-18 RX ORDER — INSULIN LISPRO 100 [IU]/ML
11 INJECTION, SOLUTION INTRAVENOUS; SUBCUTANEOUS 3 TIMES DAILY
Qty: 12 ML | Refills: 0 | Status: SHIPPED | OUTPATIENT
Start: 2025-03-18

## 2025-03-18 RX ORDER — LINEZOLID 600 MG/1
600 TABLET, FILM COATED ORAL EVERY 12 HOURS SCHEDULED
Qty: 20 TABLET | Refills: 0 | Status: SHIPPED | OUTPATIENT
Start: 2025-03-18 | End: 2025-03-28

## 2025-03-18 RX ORDER — OXYCODONE AND ACETAMINOPHEN 5; 325 MG/1; MG/1
1 TABLET ORAL EVERY 6 HOURS PRN
Qty: 12 TABLET | Refills: 0 | Status: SHIPPED | OUTPATIENT
Start: 2025-03-18 | End: 2025-03-21

## 2025-03-18 RX ADMIN — OXYCODONE HYDROCHLORIDE AND ACETAMINOPHEN 1 TABLET: 5; 325 TABLET ORAL at 08:08

## 2025-03-18 RX ADMIN — PIPERACILLIN AND TAZOBACTAM 4500 MG: 4; .5 INJECTION, POWDER, LYOPHILIZED, FOR SOLUTION INTRAVENOUS at 08:05

## 2025-03-18 RX ADMIN — GUAIFENESIN 600 MG: 600 TABLET ORAL at 08:03

## 2025-03-18 RX ADMIN — INSULIN LISPRO 11 UNITS: 100 INJECTION, SOLUTION INTRAVENOUS; SUBCUTANEOUS at 08:03

## 2025-03-18 RX ADMIN — SODIUM CHLORIDE: 0.9 INJECTION, SOLUTION INTRAVENOUS at 08:05

## 2025-03-18 RX ADMIN — LINEZOLID 600 MG: 600 TABLET, FILM COATED ORAL at 08:03

## 2025-03-18 RX ADMIN — METHOCARBAMOL 1000 MG: 500 TABLET ORAL at 08:03

## 2025-03-18 RX ADMIN — PIPERACILLIN AND TAZOBACTAM 4500 MG: 4; .5 INJECTION, POWDER, LYOPHILIZED, FOR SOLUTION INTRAVENOUS at 01:22

## 2025-03-18 ASSESSMENT — PAIN DESCRIPTION - LOCATION: LOCATION: ABDOMEN

## 2025-03-18 ASSESSMENT — PAIN DESCRIPTION - DESCRIPTORS: DESCRIPTORS: ACHING;THROBBING

## 2025-03-18 ASSESSMENT — PAIN SCALES - GENERAL: PAINLEVEL_OUTOF10: 6

## 2025-03-18 ASSESSMENT — PAIN DESCRIPTION - ORIENTATION: ORIENTATION: LEFT

## 2025-03-18 NOTE — PROGRESS NOTES
[facility]  Glycemic Control      NAME: Stacie Donato  MEDICAL RECORD NUMBER:  [id number]  AGE: 44 y.o.   GENDER: female  : 1980  EPISODE DATE:  3/18/2025     Data     Recent Labs     25  0814 25  1108 25  1617 25  0746 25  1144   POCGLU 144* 176* 193* 240* 122* 149*       HgBA1c:    Lab Results   Component Value Date/Time    LABA1C 10.3 2025 12:07 PM       BUN/Creatinine:    Lab Results   Component Value Date/Time    BUN 7 2025 05:37 AM    CREATININE 0.6 2025 05:37 AM      Latest Reference Range & Units 25 05:37   Est, Glom Filt Rate >60  >90     FIB-4 Calculation: 0.66 at 3/18/2025  5:37 AM  Calculated from:  SGOT/AST: 41 U/L at 3/7/2025 11:24 AM  SGPT/ALT: 38 U/L at 3/7/2025 11:24 AM  Platelets: 422 K/uL at 3/18/2025  5:37 AM  Age: 44 years    Medications  Scheduled Medications:   insulin lispro  11 Units SubCUTAneous TID WC    insulin glargine  0.25 Units/kg SubCUTAneous Nightly    sodium chloride flush  5-40 mL IntraVENous 2 times per day    lidocaine 1 % injection  50 mg IntraDERmal Once    linezolid  600 mg Oral 2 times per day    piperacillin-tazobactam  4,500 mg IntraVENous Q8H    methocarbamol  1,000 mg Oral 4x Daily    guaiFENesin  600 mg Oral BID    sennosides-docusate sodium  2 tablet Oral Daily    insulin lispro  0-8 Units SubCUTAneous 4x Daily AC & HS    sodium chloride flush  5-40 mL IntraVENous 2 times per day    enoxaparin  30 mg SubCUTAneous BID       Diet  Current diet/supplement order: ADULT DIET; Regular; 4 carb choices (60 gm/meal)     Recorded PO: PO Meals Eaten (%): 51 - 75% last meal in flowsheets      Action      Chart reviewed.     Glucose Target: 140-180, avoid hypoglycemia     Total Daily Insulin:  3/18/2025: 11 units (as of 0809am)  3/17/2025: 65 units + oral steroid      Recommendation(s): Continue current regimen   (Basal weight based dosin.2 units, Prandial weight based dosin.4

## 2025-03-18 NOTE — PLAN OF CARE
Problem: Discharge Planning  Goal: Discharge to home or other facility with appropriate resources  Outcome: Progressing  Flowsheets (Taken 3/17/2025 2008)  Discharge to home or other facility with appropriate resources:   Identify barriers to discharge with patient and caregiver   Identify discharge learning needs (meds, wound care, etc)     Problem: Pain  Goal: Verbalizes/displays adequate comfort level or baseline comfort level  Outcome: Progressing     Problem: Respiratory - Adult  Goal: Achieves optimal ventilation and oxygenation  Outcome: Progressing  Flowsheets (Taken 3/17/2025 2008)  Achieves optimal ventilation and oxygenation:   Assess for changes in respiratory status   Assess for changes in mentation and behavior   Position to facilitate oxygenation and minimize respiratory effort   Assess the need for suctioning and aspirate as needed   Assess and instruct to report shortness of breath or any respiratory difficulty   Respiratory therapy support as indicated     Problem: Skin/Tissue Integrity - Adult  Goal: Skin integrity remains intact  Outcome: Progressing  Flowsheets (Taken 3/17/2025 2008)  Skin Integrity Remains Intact: Monitor for areas of redness and/or skin breakdown     Problem: Gastrointestinal - Adult  Goal: Minimal or absence of nausea and vomiting  Outcome: Progressing  Flowsheets (Taken 3/17/2025 2008)  Minimal or absence of nausea and vomiting:   Administer IV fluids as ordered to ensure adequate hydration   Administer ordered antiemetic medications as needed   Provide nonpharmacologic comfort measures as appropriate   Advance diet as tolerated, if ordered     Problem: Genitourinary - Adult  Goal: Absence of urinary retention  Outcome: Progressing  Flowsheets (Taken 3/17/2025 2008)  Absence of urinary retention:   Assess patient’s ability to void and empty bladder   Monitor intake/output and perform bladder scan as needed     Problem: Infection - Adult  Goal: Absence of infection at  techniques, as appropriate     Problem: Behavior  Goal: Pt/Family maintain appropriate behavior and adhere to behavioral management agreement, if implemented  Description: INTERVENTIONS:  1. Assess patient/family's coping skills and  non-compliant behavior (including use of illegal substances)  2. Notify security of behavior or suspected illegal substances which indicate the need for search of the family and/or belongings  3. Encourage verbalization of thoughts and concerns in a socially appropriate manner  4. Utilize positive, consistent limit setting strategies supporting safety of patient, staff and others  5. Encourage participation in the decision making process about the behavioral management agreement  6. If a visitor's behavior poses a threat to safety call refer to organization policy.  7. Initiate consult with , Psychosocial CNS, Spiritual Care as appropriate  Outcome: Progressing  Flowsheets (Taken 3/17/2025 2008)  Patient/family maintains appropriate behavior and adheres to behavioral management agreement, if implemented:   Assess patient/family’s coping skills and  non-compliant behavior (including use of illegal substances)   Notify security of behavior or suspected illegal substances which indicate the need for search of the patient and/or belongings   Encourage verbalization of thoughts and concerns in a socially appropriate manner   Encourage participation in the decision making process about the behavioral management agreement     Problem: Safety - Adult  Goal: Free from fall injury  Outcome: Progressing

## 2025-03-18 NOTE — PROGRESS NOTES
Infectious Disease Follow up Notes  Admit Date: 3/7/2025  Hospital Day: 12    Antibiotics :  IV ZOSYN  IV Linezolid     CHIEF COMPLAINT:     Pleuritic pain  Left side pneumonia  Multiple cavitary nodules  Immunosuppressed  Diabetes  Morbid obesity  Chronic smoking  Night sweats  Left pleural effusion    Subjective interval History :  44 y.o.female  with history of plaque psoriasis managed by  Dermatology w topical steroid and risankizumab given q3 months, last in 11/2024  Last Quantiferon available for review was in 10/2023 was negative   She is now admitted to hospital secondary to left-sided pain associate with cough sweats and chills.  She also like a recent diabetes history of chronic smoking her main symptom is left-sided pleuritic pain with difficulty breathing CT chest indicating multiple cavitary lesions concern for infection    Left-sided chest pain slowly improving blood sugar slowly getting better WBC normal remains on room air pleural fluid cytology negative, urine histoplasma antigen negative, fungal serologies negative    CXR from this Am again with left side moderate effusion and d/w pt about thoracentesis and spoke to IR will send repeat fluid analysis         past Medical History:    Past Medical History:   Diagnosis Date    Asthma     Fatty liver     Obesity     Psoriasis     Psoriasis     Type 2 diabetes mellitus (HCC)        Past Surgical History:    Past Surgical History:   Procedure Laterality Date    CHOLECYSTECTOMY         Current Medications:    Outpatient Medications Marked as Taking for the 3/7/25 encounter (Hospital Encounter)   Medication Sig Dispense Refill    oxyCODONE-acetaminophen (PERCOCET) 5-325 MG per tablet Take 1 tablet by mouth every 6 hours as needed for Pain for up to 3 days. Max Daily Amount: 4 tablets 12 tablet 0    linezolid (ZYVOX) 600 MG tablet Take 1 tablet by mouth every 12 hours for 10  Detected Abnormal     Pneumonia Panel Molecular --    100,000 copies/mL  See additional report for complete Pneumonia panel.  Susceptibility testing of penicillin and other beta lactams is  not necessary for beta hemolytic Streptococci since resistant  strains have not been identified. (CLSI M100)   Narrative:       Component  Ref Range & Units 3 yr ago   HIV 1+2 AB/AGN  Nonreactive Nonreactive   Resulting Agency  [facility]   Narrative  Performed by [facility]  HIV-1 p24 Antigen and HIV-1/HIV-2 Antibody not detected.  Specimen Collected: 01/05/22 19:04    Performed by:  [facility]      IMAGING:  erpretation Summary  Show Result Comparison     Left Ventricle: Normal left ventricular systolic function with a visually estimated EF of 60 - 65%. Left ventricle size is normal. Normal wall thickness. Normal wall motion. Normal diastolic function.    Right Ventricle: Right ventricle size is normal. Normal systolic function.    Aorta: Normal sized aortic root. Ascending aorta may be mildly dilated measuring 3.6 cm.    No significant valvular abnormalities noted.  No obvious vegetations.        XR CHEST (2 VW)   Preliminary Result   Moderate left pleural effusion and adjacent volume loss/consolidation, not   significantly changed.         XR CHEST 1 VIEW   Final Result   Improved aeration of the left upper lung zone status post thoracentesis with   no evidence of pneumothorax.         US THORACENTESIS Which side should the procedure be performed? Left   Final Result   Successful ultrasound guided left thoracentesis.         XR CHEST PORTABLE   Final Result   Small to moderate left pleural effusion with left lower lobe airspace   disease.  This could reflect a combination of pleural fluid, atelectasis, or   pneumonia.         CT CHEST PULMONARY EMBOLISM W CONTRAST   Final Result   1.  Mildly limited evaluation of the pulmonary arterial system.  No central   pulmonary embolism identified.      2.  Multiple pulmonary

## 2025-03-18 NOTE — CARE COORDINATION
Discharge Planning:  SW spoke with pt re: d/c needs.  Pt stated she is unable to afford her prescriptions.  Pt has called [facility] for follow up prescriptions and has a clinic list for follow up.  Pt is aware medications can be filled under fay but pt will be responsible for her pain script.   [facility] spoke with Prakash in [facility] to inform him that pts medications will be filled under a fay benefit.        Medication Assistance Voucher  Patient: Stacie Donato  YOB: 1980    Floor/Unit:[id number]  Discharge Date: 3/18/2025   Approver Name/Title:    [employer] to be cross-charged: [id number]  Medications Approved : CLINICAL PHARMACY NOTE: MEDS TO BEDS    Total # of Prescriptions Filled: 8   The following medications were delivered to the patient:  Current Discharge Medication List        START taking these medications    Details   oxyCODONE-acetaminophen (PERCOCET) 5-325 MG per tablet Take 1 tablet by mouth every 6 hours as needed for Pain for up to 3 days. Max Daily Amount: 4 tablets  Qty: 12 tablet, Refills: 0    Comments: Reduce doses taken as pain becomes manageable  Associated Diagnoses: Pleurisy with effusion      linezolid (ZYVOX) 600 MG tablet Take 1 tablet by mouth every 12 hours for 10 days  Qty: 20 tablet, Refills: 0      amoxicillin-clavulanate (AUGMENTIN) 875-125 MG per tablet Take 1 tablet by mouth 2 times daily for 10 days  Qty: 20 tablet, Refills: 0      Insulin Pen Needle 31G X 4 MM MISC 1 each by Does not apply route 4 times daily (with meals and nightly) Pharmacist to identify & substitute with preferred needle for insulin pen device.  Qty: 100 each, Refills: 0      insulin lispro, 1 Unit Dial, (HUMALOG KWIKPEN) 100 UNIT/ML SOPN Inject 11 Units into the skin 3 times daily Okay to substitute Novolog or Admelog or Lyumjev or Fiasp or Apidra.  Qty: 12 mL, Refills: 0      insulin glargine (LANTUS;BASAGLAR) 100 UNIT/ML injection pen Inject 28 Units into the skin daily Okay

## 2025-03-18 NOTE — CARE COORDINATION
Discharge Planning:  Per ID, if chest x ray is improved, pt will be able to d/c on oral abx.  Attending to discuss plan with ID MD.  SW will continue to follow.   If IV abx are needed, the outpatient infusion center may be an option. Pt has no insurance.  [facility] and clinic information has been provided to pt.   SW will continue to follow to assist with d/c planning needs.   SHOAIB Mensah  [phone]  Electronically signed by SHOAIB Bartholomew on 3/18/2025 at 12:12 PM

## 2025-03-18 NOTE — CARE COORDINATION
Case Management Discharge Note          Date / Time of Note: 3/18/2025 3:45 PM                  Patient Name: Stacie Donato   YOB: 1980  Diagnosis: Shortness of breath [R06.02]  Pleural effusion on left [J90]  Left-sided chest pain [R07.9]  Acute septic pulmonary embolism, unspecified whether acute cor pulmonale present (HCC) [I26.90]   Date / Time: 3/7/2025 10:17 AM    Financial:  Payor: /      Pharmacy:    [facility] - [place], [place] - [address] - P [phone] - F [phone]  [address] [address]  Phone: [phone] Fax: [phone]      Assistance purchasing medications?: Potential Assistance Purchasing Medications: Yes ([facility] resources provided)  Assistance provided by Case Management: None at this time    DISCHARGE Disposition: Home- No Services Needed      Referrals made at DISCHARGE for outpatient continued care:  Other: [facility] and clinic list.  Tg meds provided    Transportation:  Transportation PLAN for discharge: family       IMM Completed:   Not Indicated    Additional CM Notes: Home with boyfriend.  Pt was unable to afford medications.  [facility] and clinic list provided.  Tg meds at d/c.     The Plan for Transition of Care is related to the following treatment goals of Shortness of breath [R06.02]  Pleural effusion on left [J90]  Left-sided chest pain [R07.9]  Acute septic pulmonary embolism, unspecified whether acute cor pulmonale present (HCC) [I26.90]        SHOAIB Bartholomew  [facility]   Case Management Department  Ph: [phone]  Fax: [phone]  Electronically signed by SHOAIB Bartholomew on 3/18/2025 at 3:47 PM

## 2025-03-18 NOTE — DISCHARGE SUMMARY
for: \"TROPONINT\"  Lactic Acid: No results for input(s): \"LACTA\" in the last 72 hours.  BNP: No results for input(s): \"PROBNP\" in the last 72 hours.  UA:  Lab Results   Component Value Date/Time    NITRU Negative 03/05/2025 12:19 PM    COLORU DARK YELLOW 03/05/2025 12:19 PM    PHUR 5.5 03/08/2025 12:19 PM    PHUR 6.0 07/02/2019 08:00 PM    WBCUA 0-2 03/05/2025 12:19 PM    RBCUA None seen 03/05/2025 12:19 PM    TRICHOMONAS Present 03/05/2012 06:30 PM    BACTERIA 1+ 03/05/2025 12:19 PM    CLARITYU Clear 03/05/2025 12:19 PM    LEUKOCYTESUR Negative 03/05/2025 12:19 PM    UROBILINOGEN 1.0 03/05/2025 12:19 PM    BILIRUBINUR Negative 03/05/2025 12:19 PM    BLOODU Negative 03/05/2025 12:19 PM    GLUCOSEU >=1000 03/05/2025 12:19 PM    GLUCOSEU Negative 03/05/2012 06:50 PM    KETUA TRACE 03/05/2025 12:19 PM     Urine Cultures: No results found for: \"LABURIN\"  Blood Cultures:   Lab Results   Component Value Date/Time    BC No Growth after 4 days of incubation. 03/07/2025 05:54 PM     Lab Results   Component Value Date/Time    BLOODCULT2 No Growth after 4 days of incubation. 03/07/2025 05:58 PM     Organism:   Lab Results   Component Value Date/Time    ORG Strep agalactiae (Beta Strep Group B) 03/10/2025 06:16 PM    ORG Streptococcus agalactiae (BHS Gr B) DNA Detected 03/10/2025 06:16 PM       Time Spent Discharging patient 40 minutes    Electronically signed by David Dasilva DO on 3/18/2025 at 3:05 PM

## 2025-03-19 LAB — ACID FAST STN SPEC QL: NORMAL

## 2025-03-20 LAB — LOEFFLER MB STN SPEC: NORMAL

## 2025-03-21 LAB
BACTERIA FLD AEROBE CULT: NORMAL
GRAM STN SPEC: NORMAL

## 2025-03-24 ENCOUNTER — PHARMACY VISIT (OUTPATIENT)
Dept: PHARMACY | Age: 45
End: 2025-03-24

## 2025-03-24 DIAGNOSIS — E11.65 TYPE 2 DIABETES MELLITUS WITH HYPERGLYCEMIA, WITH LONG-TERM CURRENT USE OF INSULIN (HCC): Primary | ICD-10-CM

## 2025-03-24 DIAGNOSIS — Z79.4 TYPE 2 DIABETES MELLITUS WITH HYPERGLYCEMIA, WITH LONG-TERM CURRENT USE OF INSULIN (HCC): Primary | ICD-10-CM

## 2025-03-24 PROBLEM — E11.9 TYPE 2 DIABETES MELLITUS WITHOUT COMPLICATION, WITH LONG-TERM CURRENT USE OF INSULIN: Status: ACTIVE | Noted: 2025-03-24

## 2025-03-24 PROCEDURE — 99213 OFFICE O/P EST LOW 20 MIN: CPT | Performed by: SPEECH-LANGUAGE PATHOLOGIST

## 2025-03-24 NOTE — PROGRESS NOTES
[facility]  Outpatient Wellness Center  Pharmacy  Diabetes Service    [address] [place], [place] [address]  Phone: [phone]  Fax: [phone]    NAME: Stacie Donato  MEDICAL RECORD NUMBER:  [id number]  AGE: 44 y.o.   GENDER: female  : 1980  EPISODE DATE:  3/24/2025       Stacie Donato was referred to the Wellness Center by David COPELAND DO   for Diabetes services.   Special Instructions per the Referral Include: Patient Education and Medication Management      ICD-10-CM    1. Type 2 diabetes mellitus with hyperglycemia, with long-term current use of insulin ([facility])  E11.65     Z79.4           Referral goals: No diabetes goals were included on the referral     Medication adjustments or recommendations will follow ADA guidelines unless otherwise specified on the referral.    Juan Quinones is a 44 y.o. here for the Diabetes Service for self-management education, medication review including over the counter medications and herbal products, overall wellbeing assessment, transition of care and any needed adjustments with updates and recommendations communicated to the referring physician.  Stacie Donato appears well and in no acute distress. Comes with no ambulatory device assistance. Is here today alone for diabetes management. Stacie appears ready, willing and able to engage in self-management activities.     Pertinent findings in the office today:  Fatigue, Denies polyuria, Denies symptoms of neuropathy, Denies foot ulcerations, Denies nausea, Denies vomiting, and Denies weight loss  - Prior to 2025 hospitalization she felt significant polyuria and polyphagia   - From 4614-5623 she had an unintentional weight loss of ~ 80 lbs, no longer reports weight loss today    Date of diabetes diagnosis: Less than one year, 3/2025    Ongoing factors affecting care:   - Hospitalization at [facility] from 3/7 - 3/18/2025 for PNA   - Currently uninsured, although

## 2025-03-24 NOTE — PATIENT INSTRUCTIONS
Patient Instructions:  Keep taking your Lantus 28 units every night.  Change the way you take your Humalog, take Humalog 8 units before meals.    your metformin from the [facility], you will be taking metformin 1 tablet twice a day with meals.   Go to your PCP appointment with Dr. Sy on 3/27/2025.   Keep up the great work with making changes in your diet!   If you have symptoms of low blood sugar (jitteriness, nausea, headache, etc.) check your blood sugar. If it's less than 70, eat something with 15 grams of carbs. Call us at [phone] if your symptoms don't resolve.   Items with 15g carbs:   1/2 cup fruit  Candy (check nutrition lable)  1/2 cup of regular pop or juice

## 2025-03-25 ENCOUNTER — TELEPHONE (OUTPATIENT)
Dept: PHARMACY | Age: 45
End: 2025-03-25

## 2025-03-25 ENCOUNTER — COMMUNITY OUTREACH (OUTPATIENT)
Dept: OTHER | Age: 45
End: 2025-03-25

## 2025-03-25 NOTE — TELEPHONE ENCOUNTER
Returned Stacie's call.     No answer, Left message and advised she would use her glucometer to check her BG. Asked her to call Wednesday 3/26 and we could discuss getting her another sample if appropriate.    Jasmin Carpio, PharmD  [facility]   Outpatient Wellness Center  Diabetes Service  [phone]    For Pharmacy Admin Tracking Only    Program: Medication Management  CPA in place:  Yes  Recommendation Provided To:   Intervention Detail:   Intervention Accepted By:   Gap Closed?:    Time Spent (min): 5

## 2025-03-25 NOTE — TELEPHONE ENCOUNTER
----- Message from Leonard MELENDEZ sent at 3/25/2025 11:32 AM EDT -----  Pt lvm stating she was seen here yesterday her porter fell off would like to know what she needs to do.   [phone]

## 2025-03-25 NOTE — PROGRESS NOTES
Pt called [facility] this date.  He had ronel referred to [facility] at the time of her discharge last week from [facility].  Pt had a lengthy hospital stay at which time she learned she has no health insurance.  Pt apparently lost Medicaid coverage in September due to exceeding income eligibility.   She cannot enroll in her employer coverage until November 2025.  She is not eligible for a special enrollment period for the [facility].  At the time of her hospitalization, Pt had no primary care provider. She has a new patient appointment at [facility] on 3/27.  She was discharged from the hospital with a 2 months supply of medications.  She has submitted an application to [employer] for assistance with med costs moving forward.  Based on Pt's stated income, she is likely eligible for [facility] financial assistance which would significantly reduce her outstanding hospital bill.  Pt also meets eligibility for enrollment in the [facility] Partnership Program ([facility]).  Ideally, Pt would be enrolled in PP to bridge her to the time she can enroll in employer coverage.  REGINA sent an email to Pt containing a copy of the [facility] financial assistance application with instructions on needed income/bank verification and email for submitting the completed application.  REGINA also sent PP enrollment documents and scheduled an appointment time for 3/26 to complete the enrollment process with Pt by phone.

## 2025-03-26 ENCOUNTER — COMMUNITY OUTREACH (OUTPATIENT)
Dept: OTHER | Age: 45
End: 2025-03-26

## 2025-03-26 NOTE — PROGRESS NOTES
SW and Pt spoke this date by phone, as scheduled, and completed [facility] enrollment documents.  Pt to email signed documents and has already submitted income verification.  Pt enrolled in [facility] this date.

## 2025-03-27 ENCOUNTER — OFFICE VISIT (OUTPATIENT)
Dept: PRIMARY CARE CLINIC | Age: 45
End: 2025-03-27

## 2025-03-27 VITALS
BODY MASS INDEX: 51.62 KG/M2 | WEIGHT: 264.3 LBS | TEMPERATURE: 98.4 F | OXYGEN SATURATION: 96 % | HEART RATE: 100 BPM | DIASTOLIC BLOOD PRESSURE: 87 MMHG | SYSTOLIC BLOOD PRESSURE: 144 MMHG

## 2025-03-27 DIAGNOSIS — J15.69 GRAM-NEGATIVE PNEUMONIA (HCC): ICD-10-CM

## 2025-03-27 DIAGNOSIS — E11.9 TYPE 2 DIABETES MELLITUS WITHOUT COMPLICATION, WITH LONG-TERM CURRENT USE OF INSULIN: Primary | ICD-10-CM

## 2025-03-27 DIAGNOSIS — Z79.4 TYPE 2 DIABETES MELLITUS WITHOUT COMPLICATION, WITH LONG-TERM CURRENT USE OF INSULIN: Primary | ICD-10-CM

## 2025-03-27 DIAGNOSIS — R03.0 ELEVATED BLOOD PRESSURE READING: ICD-10-CM

## 2025-03-27 PROCEDURE — 3046F HEMOGLOBIN A1C LEVEL >9.0%: CPT | Performed by: INTERNAL MEDICINE

## 2025-03-27 PROCEDURE — 99213 OFFICE O/P EST LOW 20 MIN: CPT | Performed by: INTERNAL MEDICINE

## 2025-03-27 RX ORDER — METFORMIN HYDROCHLORIDE 500 MG/1
500 TABLET, EXTENDED RELEASE ORAL
Qty: 90 TABLET | Refills: 2 | Status: SHIPPED | OUTPATIENT
Start: 2025-03-27

## 2025-03-27 NOTE — PATIENT INSTRUCTIONS
Type 2 Diabetes:   Initial hemoglobin A1c was 10.3% (goal less than 6.5%).  START taking metformin extended release 500 mg tablet ONCE per day in the morning.  Continue glargine (Basaglar Quickpen) 30 units nightly.  STOP using the lispro Humalog insulin with meals.  Check morning (fasting) glucose level every morning, write down and bring to clinic.   Pneumonia with loculated parapneumonic effusion:  Oxygen saturation is 96% today.  CT chest pending (April 15) with subsequent pulmonary follow up. Completed the course of linezolid and Augmentin antibiotics.    Blood pressure moderately elevated today at 144/87, goal less than 130/80.   We will monitor.       Follow-up in four weeks

## 2025-03-27 NOTE — PROGRESS NOTES
(H)     Pleural fluid fungal stain / culture (3/18) negative  Pleural fluid culture (3/18) no growth    CRP (3/9) 86.3, (3/15) 75.1 mg/L    OBJECTIVE:    /87  Pulse 100   Temp 98.4 °F (36.9 °C) (Oral)   Wt 119.9 kg (264 lb 4.8 oz)   SpO2 96%   BMI 51.62 kg/m²   HEENT:  Oropharynx clear, no lymphadenopathy,   LUNGS:  Clear and without wheezes  HEART:  Regular rhythm, no appreciable murmur  ABD:  Benign, active bowel sounds  EXT:  No edema, pulses palpable  NEURO:  No focal neurologic deficits noted.    ASSESSMENT / PLAN:   Type 2 Diabetes:   Initial hemoglobin A1c was 10.3% (goal less than 6.5%).  START taking metformin extended release 500 mg tablet ONCE per day in the morning.  Continue glargine (Basaglar Quickpen) 30 units nightly.  STOP using the lispro Humalog insulin with meals.  Check morning (fasting) glucose level every morning, write down and bring to clinic.   Pneumonia with loculated parapneumonic effusion:  Oxygen saturation is 96% today.  CT chest pending (April 15) with subsequent pulmonary follow up. Completed the course of linezolid and Augmentin antibiotics.    Blood pressure moderately elevated today at 144/87, goal less than 130/80.   We will monitor.   Class III obesity (BMI 51.6).       Follow-up in four weeks  Review morning glucose levels.

## 2025-03-31 LAB
FUNGUS SPEC CULT: NORMAL
LOEFFLER MB STN SPEC: NORMAL

## 2025-04-01 LAB
ACID FAST STN SPEC QL: NORMAL
MYCOBACTERIUM SPEC CULT: NORMAL

## 2025-04-07 LAB
FUNGUS SPEC CULT: NORMAL
LOEFFLER MB STN SPEC: NORMAL

## 2025-04-08 ENCOUNTER — PHARMACY VISIT (OUTPATIENT)
Dept: PHARMACY | Age: 45
End: 2025-04-08
Payer: COMMERCIAL

## 2025-04-08 DIAGNOSIS — Z79.4 TYPE 2 DIABETES MELLITUS WITHOUT COMPLICATION, WITH LONG-TERM CURRENT USE OF INSULIN: Primary | ICD-10-CM

## 2025-04-08 DIAGNOSIS — E11.9 TYPE 2 DIABETES MELLITUS WITHOUT COMPLICATION, WITH LONG-TERM CURRENT USE OF INSULIN: Primary | ICD-10-CM

## 2025-04-08 LAB
ACID FAST STN SPEC QL: NORMAL
MYCOBACTERIUM SPEC CULT: NORMAL

## 2025-04-08 PROCEDURE — 99213 OFFICE O/P EST LOW 20 MIN: CPT | Performed by: SPEECH-LANGUAGE PATHOLOGIST

## 2025-04-08 NOTE — PROGRESS NOTES
[facility]  Outpatient Wellness Center  Pharmacy  Diabetes Service    [address] [place], [place] [address]  Phone: [phone]  Fax: [phone]    NAME: Stacie Donato  MEDICAL RECORD NUMBER:  [id number]  AGE: 44 y.o.   GENDER: female  : 1980  EPISODE DATE:  2025       Stacie Donato was referred to the Wellness Center by David COPELAND DO   for Diabetes services.   Special Instructions per the Referral Include: Patient Education and Medication Management    No diagnosis found.      Referral goals: No diabetes goals were included on the referral     Medication adjustments or recommendations will follow ADA guidelines unless otherwise specified on the referral.    Subjective   Stacie is a 44 y.o. here for the Diabetes Service for self-management education, medication review including over the counter medications and herbal products, overall wellbeing assessment, transition of care and any needed adjustments with updates and recommendations communicated to the referring physician.  Stacie Donato appears well and in no acute distress. Comes with no ambulatory device assistance. Is here today alone for diabetes management. Stacie appears ready, willing and able to engage in self-management activities.     Pertinent findings in the office today:  Fatigue, Denies polyuria, Denies symptoms of neuropathy, Denies foot ulcerations, Denies nausea, Denies vomiting, and Denies weight loss  - Prior to 2025 hospitalization she felt significant polyuria and polyphagia   - From 5991-4640 she had an unintentional weight loss of ~ 80 lbs, no longer reports weight loss today    Date of diabetes diagnosis: Less than one year, 3/2025    Ongoing factors affecting care:   - Hospitalization at [facility] from 3/7 - 3/18/2025 for PNA   - Currently uninsured, [facility]  able to bill her medications through [employer]. Stacie is

## 2025-04-08 NOTE — PATIENT INSTRUCTIONS
Patient Instructions:  Increase metformin XR to 500 mg twice daily with meals.  Keep taking your Lantus 30 units every night.  Call Dr. Tanner's office and ask them to put in the order for your chest CT (you can try calling Dr. Sy as well).  Once the order is placed you can call [phone] to schedule.     Keep up the great work with making changes in your diet!   If you have symptoms of low blood sugar (jitteriness, nausea, headache, etc.) check your blood sugar. If it's less than 70, eat something with 15 grams of carbs. Call us at [phone] if your symptoms don't resolve.   Items with 15g carbs:   1/2 cup fruit  Candy (check nutrition lable)  1/2 cup of regular pop or juice

## 2025-04-09 ENCOUNTER — HOSPITAL ENCOUNTER (INPATIENT)
Age: 45
LOS: 9 days | Discharge: HOME | End: 2025-04-18
Attending: EMERGENCY MEDICINE | Admitting: FAMILY MEDICINE
Payer: COMMERCIAL

## 2025-04-09 ENCOUNTER — APPOINTMENT (OUTPATIENT)
Dept: GENERAL RADIOLOGY | Age: 45
End: 2025-04-09
Payer: COMMERCIAL

## 2025-04-09 ENCOUNTER — APPOINTMENT (OUTPATIENT)
Dept: CT IMAGING | Age: 45
End: 2025-04-09
Payer: COMMERCIAL

## 2025-04-09 DIAGNOSIS — J90 RECURRENT LEFT PLEURAL EFFUSION: ICD-10-CM

## 2025-04-09 DIAGNOSIS — E83.42 HYPOMAGNESEMIA: ICD-10-CM

## 2025-04-09 DIAGNOSIS — J86.9 EMPYEMA (HCC): Primary | ICD-10-CM

## 2025-04-09 DIAGNOSIS — J90 PLEURAL EFFUSION ON LEFT: ICD-10-CM

## 2025-04-09 LAB
ALBUMIN SERPL-MCNC: 3.7 G/DL (ref 3.4–5)
ALBUMIN/GLOB SERPL: 0.9 {RATIO} (ref 1.1–2.2)
ALP SERPL-CCNC: 99 U/L (ref 40–129)
ALT SERPL-CCNC: 9 U/L (ref 10–40)
ANION GAP SERPL CALCULATED.3IONS-SCNC: 17 MMOL/L (ref 3–16)
AST SERPL-CCNC: 13 U/L (ref 15–37)
BASE EXCESS BLDV CALC-SCNC: 2.8 MMOL/L
BASOPHILS # BLD: 0.1 K/UL (ref 0–0.2)
BASOPHILS NFR BLD: 0.7 %
BILIRUB SERPL-MCNC: 1.6 MG/DL (ref 0–1)
BUN SERPL-MCNC: 6 MG/DL (ref 7–20)
CALCIUM SERPL-MCNC: 9.2 MG/DL (ref 8.3–10.6)
CHLORIDE SERPL-SCNC: 94 MMOL/L (ref 99–110)
CO2 BLDV-SCNC: 30 MMOL/L
CO2 SERPL-SCNC: 24 MMOL/L (ref 21–32)
COHGB MFR BLDV: 2.2 %
CREAT SERPL-MCNC: 0.8 MG/DL (ref 0.6–1.1)
CRP SERPL-MCNC: 117 MG/L (ref 0–5.1)
DEPRECATED RDW RBC AUTO: 13.5 % (ref 12.4–15.4)
EKG ATRIAL RATE: 106 BPM
EKG DIAGNOSIS: NORMAL
EKG P AXIS: 35 DEGREES
EKG P-R INTERVAL: 134 MS
EKG Q-T INTERVAL: 342 MS
EKG QRS DURATION: 76 MS
EKG QTC CALCULATION (BAZETT): 454 MS
EKG R AXIS: 17 DEGREES
EKG T AXIS: 65 DEGREES
EKG VENTRICULAR RATE: 106 BPM
EOSINOPHIL # BLD: 0 K/UL (ref 0–0.6)
EOSINOPHIL NFR BLD: 0.2 %
ERYTHROCYTE [SEDIMENTATION RATE] IN BLOOD BY WESTERGREN METHOD: 86 MM/HR (ref 0–20)
GFR SERPLBLD CREATININE-BSD FMLA CKD-EPI: >90 ML/MIN/{1.73_M2}
GLUCOSE BLD-MCNC: 135 MG/DL (ref 70–99)
GLUCOSE BLD-MCNC: 206 MG/DL (ref 70–99)
GLUCOSE SERPL-MCNC: 212 MG/DL (ref 70–99)
HCG SERPL QL: NEGATIVE
HCO3 BLDV-SCNC: 28 MMOL/L (ref 23–29)
HCT VFR BLD AUTO: 37.3 % (ref 36–48)
HGB BLD-MCNC: 12.7 G/DL (ref 12–16)
LACTATE BLDV-SCNC: 1.4 MMOL/L (ref 0.4–1.9)
LYMPHOCYTES # BLD: 1.6 K/UL (ref 1–5.1)
LYMPHOCYTES NFR BLD: 10.6 %
MAGNESIUM SERPL-MCNC: 1.39 MG/DL (ref 1.8–2.4)
MCH RBC QN AUTO: 31.8 PG (ref 26–34)
MCHC RBC AUTO-ENTMCNC: 34 G/DL (ref 31–36)
MCV RBC AUTO: 93.6 FL (ref 80–100)
METHGB MFR BLDV: 0.3 %
MONOCYTES # BLD: 1.2 K/UL (ref 0–1.3)
MONOCYTES NFR BLD: 8 %
NEUTROPHILS # BLD: 12.1 K/UL (ref 1.7–7.7)
NEUTROPHILS NFR BLD: 80.5 %
NT-PROBNP SERPL-MCNC: 99 PG/ML (ref 0–124)
O2 THERAPY: NORMAL
PCO2 BLDV: 46.4 MMHG (ref 40–50)
PERFORMED ON: ABNORMAL
PERFORMED ON: ABNORMAL
PH BLDV: 7.39 [PH] (ref 7.35–7.45)
PLATELET # BLD AUTO: 422 K/UL (ref 135–450)
PMV BLD AUTO: 7.6 FL (ref 5–10.5)
PO2 BLDV: <30 MMHG
POTASSIUM SERPL-SCNC: 3.5 MMOL/L (ref 3.5–5.1)
PROT SERPL-MCNC: 7.9 G/DL (ref 6.4–8.2)
RBC # BLD AUTO: 3.99 M/UL (ref 4–5.2)
SAO2 % BLDV: 43 %
SODIUM SERPL-SCNC: 135 MMOL/L (ref 136–145)
TROPONIN, HIGH SENSITIVITY: 11 NG/L (ref 0–14)
WBC # BLD AUTO: 15 K/UL (ref 4–11)

## 2025-04-09 PROCEDURE — 71260 CT THORAX DX C+: CPT

## 2025-04-09 PROCEDURE — 71045 X-RAY EXAM CHEST 1 VIEW: CPT

## 2025-04-09 PROCEDURE — 84484 ASSAY OF TROPONIN QUANT: CPT

## 2025-04-09 PROCEDURE — 6370000000 HC RX 637 (ALT 250 FOR IP)

## 2025-04-09 PROCEDURE — 76937 US GUIDE VASCULAR ACCESS: CPT

## 2025-04-09 PROCEDURE — 84703 CHORIONIC GONADOTROPIN ASSAY: CPT

## 2025-04-09 PROCEDURE — 6360000002 HC RX W HCPCS

## 2025-04-09 PROCEDURE — 80053 COMPREHEN METABOLIC PANEL: CPT

## 2025-04-09 PROCEDURE — 83735 ASSAY OF MAGNESIUM: CPT

## 2025-04-09 PROCEDURE — 36569 INSJ PICC 5 YR+ W/O IMAGING: CPT

## 2025-04-09 PROCEDURE — 2580000003 HC RX 258: Performed by: EMERGENCY MEDICINE

## 2025-04-09 PROCEDURE — 82803 BLOOD GASES ANY COMBINATION: CPT

## 2025-04-09 PROCEDURE — 6360000004 HC RX CONTRAST MEDICATION: Performed by: EMERGENCY MEDICINE

## 2025-04-09 PROCEDURE — 93005 ELECTROCARDIOGRAM TRACING: CPT | Performed by: EMERGENCY MEDICINE

## 2025-04-09 PROCEDURE — C1751 CATH, INF, PER/CENT/MIDLINE: HCPCS

## 2025-04-09 PROCEDURE — 02HV33Z INSERTION OF INFUSION DEVICE INTO SUPERIOR VENA CAVA, PERCUTANEOUS APPROACH: ICD-10-PCS | Performed by: INTERNAL MEDICINE

## 2025-04-09 PROCEDURE — 99285 EMERGENCY DEPT VISIT HI MDM: CPT

## 2025-04-09 PROCEDURE — 83880 ASSAY OF NATRIURETIC PEPTIDE: CPT

## 2025-04-09 PROCEDURE — 83605 ASSAY OF LACTIC ACID: CPT

## 2025-04-09 PROCEDURE — 1200000000 HC SEMI PRIVATE

## 2025-04-09 PROCEDURE — 85025 COMPLETE CBC W/AUTO DIFF WBC: CPT

## 2025-04-09 PROCEDURE — 87040 BLOOD CULTURE FOR BACTERIA: CPT

## 2025-04-09 PROCEDURE — 36415 COLL VENOUS BLD VENIPUNCTURE: CPT

## 2025-04-09 PROCEDURE — 93010 ELECTROCARDIOGRAM REPORT: CPT | Performed by: INTERNAL MEDICINE

## 2025-04-09 PROCEDURE — 85652 RBC SED RATE AUTOMATED: CPT

## 2025-04-09 PROCEDURE — 86140 C-REACTIVE PROTEIN: CPT

## 2025-04-09 PROCEDURE — 6370000000 HC RX 637 (ALT 250 FOR IP): Performed by: NURSE PRACTITIONER

## 2025-04-09 PROCEDURE — 2580000003 HC RX 258

## 2025-04-09 RX ORDER — POTASSIUM CHLORIDE 7.45 MG/ML
10 INJECTION INTRAVENOUS PRN
Status: DISCONTINUED | OUTPATIENT
Start: 2025-04-09 | End: 2025-04-18 | Stop reason: HOSPADM

## 2025-04-09 RX ORDER — LINEZOLID 2 MG/ML
600 INJECTION, SOLUTION INTRAVENOUS EVERY 12 HOURS
Status: DISCONTINUED | OUTPATIENT
Start: 2025-04-09 | End: 2025-04-18 | Stop reason: HOSPADM

## 2025-04-09 RX ORDER — SODIUM CHLORIDE 9 MG/ML
INJECTION, SOLUTION INTRAVENOUS PRN
Status: DISCONTINUED | OUTPATIENT
Start: 2025-04-09 | End: 2025-04-18 | Stop reason: HOSPADM

## 2025-04-09 RX ORDER — IBUPROFEN, ACETAMINOPHEN 250; 125 MG/1; MG/1
1-2 TABLET, FILM COATED ORAL EVERY 8 HOURS PRN
COMMUNITY

## 2025-04-09 RX ORDER — SODIUM CHLORIDE 0.9 % (FLUSH) 0.9 %
5-40 SYRINGE (ML) INJECTION PRN
Status: DISCONTINUED | OUTPATIENT
Start: 2025-04-09 | End: 2025-04-18 | Stop reason: HOSPADM

## 2025-04-09 RX ORDER — SODIUM CHLORIDE 0.9 % (FLUSH) 0.9 %
5-40 SYRINGE (ML) INJECTION PRN
Status: DISCONTINUED | OUTPATIENT
Start: 2025-04-09 | End: 2025-04-10 | Stop reason: SDUPTHER

## 2025-04-09 RX ORDER — 0.9 % SODIUM CHLORIDE 0.9 %
1000 INTRAVENOUS SOLUTION INTRAVENOUS ONCE
Status: COMPLETED | OUTPATIENT
Start: 2025-04-09 | End: 2025-04-09

## 2025-04-09 RX ORDER — MAGNESIUM SULFATE 1 G/100ML
INJECTION INTRAVENOUS
Status: COMPLETED
Start: 2025-04-09 | End: 2025-04-09

## 2025-04-09 RX ORDER — LINEZOLID 2 MG/ML
600 INJECTION, SOLUTION INTRAVENOUS ONCE
Status: DISCONTINUED | OUTPATIENT
Start: 2025-04-09 | End: 2025-04-09 | Stop reason: SDUPTHER

## 2025-04-09 RX ORDER — INSULIN LISPRO 100 [IU]/ML
11 INJECTION, SOLUTION INTRAVENOUS; SUBCUTANEOUS
Status: DISCONTINUED | OUTPATIENT
Start: 2025-04-09 | End: 2025-04-15

## 2025-04-09 RX ORDER — GLUCAGON 1 MG/ML
1 KIT INJECTION PRN
Status: DISCONTINUED | OUTPATIENT
Start: 2025-04-09 | End: 2025-04-18 | Stop reason: HOSPADM

## 2025-04-09 RX ORDER — SODIUM CHLORIDE 9 MG/ML
INJECTION, SOLUTION INTRAVENOUS PRN
Status: DISCONTINUED | OUTPATIENT
Start: 2025-04-09 | End: 2025-04-10 | Stop reason: SDUPTHER

## 2025-04-09 RX ORDER — INSULIN GLARGINE 100 [IU]/ML
30 INJECTION, SOLUTION SUBCUTANEOUS NIGHTLY
Status: DISCONTINUED | OUTPATIENT
Start: 2025-04-09 | End: 2025-04-18 | Stop reason: HOSPADM

## 2025-04-09 RX ORDER — FAMOTIDINE 10 MG
10 TABLET ORAL 2 TIMES DAILY PRN
COMMUNITY

## 2025-04-09 RX ORDER — IOPAMIDOL 755 MG/ML
75 INJECTION, SOLUTION INTRAVASCULAR
Status: COMPLETED | OUTPATIENT
Start: 2025-04-09 | End: 2025-04-09

## 2025-04-09 RX ORDER — OXYCODONE AND ACETAMINOPHEN 5; 325 MG/1; MG/1
1 TABLET ORAL EVERY 6 HOURS PRN
Refills: 0 | Status: DISCONTINUED | OUTPATIENT
Start: 2025-04-09 | End: 2025-04-18 | Stop reason: HOSPADM

## 2025-04-09 RX ORDER — POTASSIUM CHLORIDE 1500 MG/1
40 TABLET, EXTENDED RELEASE ORAL PRN
Status: DISCONTINUED | OUTPATIENT
Start: 2025-04-09 | End: 2025-04-18 | Stop reason: HOSPADM

## 2025-04-09 RX ORDER — MAGNESIUM SULFATE IN WATER 40 MG/ML
2000 INJECTION, SOLUTION INTRAVENOUS PRN
Status: DISCONTINUED | OUTPATIENT
Start: 2025-04-09 | End: 2025-04-18 | Stop reason: HOSPADM

## 2025-04-09 RX ORDER — DEXTROSE MONOHYDRATE 100 MG/ML
INJECTION, SOLUTION INTRAVENOUS CONTINUOUS PRN
Status: DISCONTINUED | OUTPATIENT
Start: 2025-04-09 | End: 2025-04-18 | Stop reason: HOSPADM

## 2025-04-09 RX ORDER — LIDOCAINE HYDROCHLORIDE 10 MG/ML
50 INJECTION, SOLUTION EPIDURAL; INFILTRATION; INTRACAUDAL; PERINEURAL ONCE
Status: DISCONTINUED | OUTPATIENT
Start: 2025-04-09 | End: 2025-04-18 | Stop reason: HOSPADM

## 2025-04-09 RX ORDER — SODIUM CHLORIDE 0.9 % (FLUSH) 0.9 %
5-40 SYRINGE (ML) INJECTION EVERY 12 HOURS SCHEDULED
Status: DISCONTINUED | OUTPATIENT
Start: 2025-04-09 | End: 2025-04-18 | Stop reason: HOSPADM

## 2025-04-09 RX ORDER — FAMOTIDINE 20 MG/1
10 TABLET, FILM COATED ORAL 2 TIMES DAILY PRN
Status: DISCONTINUED | OUTPATIENT
Start: 2025-04-09 | End: 2025-04-18 | Stop reason: HOSPADM

## 2025-04-09 RX ORDER — POLYETHYLENE GLYCOL 3350 17 G/17G
17 POWDER, FOR SOLUTION ORAL DAILY PRN
Status: DISCONTINUED | OUTPATIENT
Start: 2025-04-09 | End: 2025-04-18 | Stop reason: HOSPADM

## 2025-04-09 RX ORDER — ACETAMINOPHEN 325 MG/1
650 TABLET ORAL EVERY 6 HOURS PRN
Status: DISCONTINUED | OUTPATIENT
Start: 2025-04-09 | End: 2025-04-18 | Stop reason: HOSPADM

## 2025-04-09 RX ORDER — ONDANSETRON 4 MG/1
4 TABLET, ORALLY DISINTEGRATING ORAL EVERY 8 HOURS PRN
Status: DISCONTINUED | OUTPATIENT
Start: 2025-04-09 | End: 2025-04-18 | Stop reason: HOSPADM

## 2025-04-09 RX ORDER — ACETAMINOPHEN 650 MG/1
650 SUPPOSITORY RECTAL EVERY 6 HOURS PRN
Status: DISCONTINUED | OUTPATIENT
Start: 2025-04-09 | End: 2025-04-18 | Stop reason: HOSPADM

## 2025-04-09 RX ORDER — ENOXAPARIN SODIUM 100 MG/ML
30 INJECTION SUBCUTANEOUS 2 TIMES DAILY
Status: DISCONTINUED | OUTPATIENT
Start: 2025-04-09 | End: 2025-04-18 | Stop reason: HOSPADM

## 2025-04-09 RX ORDER — SODIUM CHLORIDE 0.9 % (FLUSH) 0.9 %
5-40 SYRINGE (ML) INJECTION EVERY 12 HOURS SCHEDULED
Status: DISCONTINUED | OUTPATIENT
Start: 2025-04-09 | End: 2025-04-10 | Stop reason: SDUPTHER

## 2025-04-09 RX ORDER — ONDANSETRON 2 MG/ML
4 INJECTION INTRAMUSCULAR; INTRAVENOUS EVERY 6 HOURS PRN
Status: DISCONTINUED | OUTPATIENT
Start: 2025-04-09 | End: 2025-04-18 | Stop reason: HOSPADM

## 2025-04-09 RX ORDER — MAGNESIUM SULFATE 1 G/100ML
1000 INJECTION INTRAVENOUS ONCE
Status: COMPLETED | OUTPATIENT
Start: 2025-04-09 | End: 2025-04-09

## 2025-04-09 RX ADMIN — ACETAMINOPHEN 650 MG: 325 TABLET ORAL at 22:16

## 2025-04-09 RX ADMIN — SODIUM CHLORIDE 1000 ML: 0.9 INJECTION, SOLUTION INTRAVENOUS at 13:09

## 2025-04-09 RX ADMIN — MAGNESIUM SULFATE HEPTAHYDRATE 1000 MG: 1 INJECTION, SOLUTION INTRAVENOUS at 15:26

## 2025-04-09 RX ADMIN — INSULIN GLARGINE 30 UNITS: 100 INJECTION, SOLUTION SUBCUTANEOUS at 22:02

## 2025-04-09 RX ADMIN — IOPAMIDOL 75 ML: 755 INJECTION, SOLUTION INTRAVENOUS at 13:29

## 2025-04-09 RX ADMIN — ENOXAPARIN SODIUM 30 MG: 100 INJECTION SUBCUTANEOUS at 22:02

## 2025-04-09 RX ADMIN — PIPERACILLIN AND TAZOBACTAM 3375 MG: 3; .375 INJECTION, POWDER, LYOPHILIZED, FOR SOLUTION INTRAVENOUS at 22:13

## 2025-04-09 RX ADMIN — OXYCODONE AND ACETAMINOPHEN 1 TABLET: 325; 5 TABLET ORAL at 17:22

## 2025-04-09 RX ADMIN — LINEZOLID 600 MG: 600 INJECTION, SOLUTION INTRAVENOUS at 15:27

## 2025-04-09 RX ADMIN — INSULIN LISPRO 11 UNITS: 100 INJECTION, SOLUTION INTRAVENOUS; SUBCUTANEOUS at 17:23

## 2025-04-09 RX ADMIN — MAGNESIUM SULFATE 1000 MG: 1 INJECTION INTRAVENOUS at 15:26

## 2025-04-09 RX ADMIN — Medication 6 MG: at 23:21

## 2025-04-09 RX ADMIN — OXYCODONE AND ACETAMINOPHEN 1 TABLET: 325; 5 TABLET ORAL at 23:21

## 2025-04-09 ASSESSMENT — PAIN DESCRIPTION - DESCRIPTORS
DESCRIPTORS: ACHING
DESCRIPTORS: ACHING
DESCRIPTORS: PRESSURE
DESCRIPTORS: HEAVINESS;THROBBING

## 2025-04-09 ASSESSMENT — PAIN DESCRIPTION - ORIENTATION: ORIENTATION: OUTER;LEFT

## 2025-04-09 ASSESSMENT — PAIN - FUNCTIONAL ASSESSMENT: PAIN_FUNCTIONAL_ASSESSMENT: 0-10

## 2025-04-09 ASSESSMENT — PAIN SCALES - GENERAL
PAINLEVEL_OUTOF10: 7
PAINLEVEL_OUTOF10: 5
PAINLEVEL_OUTOF10: 8
PAINLEVEL_OUTOF10: 8
PAINLEVEL_OUTOF10: 4
PAINLEVEL_OUTOF10: 8

## 2025-04-09 ASSESSMENT — PAIN DESCRIPTION - LOCATION
LOCATION: SHOULDER;BACK;CHEST
LOCATION: CHEST;ARM
LOCATION: CHEST
LOCATION: ARM;SHOULDER;CHEST

## 2025-04-09 ASSESSMENT — LIFESTYLE VARIABLES
HOW MANY STANDARD DRINKS CONTAINING ALCOHOL DO YOU HAVE ON A TYPICAL DAY: PATIENT DOES NOT DRINK
HOW OFTEN DO YOU HAVE A DRINK CONTAINING ALCOHOL: NEVER

## 2025-04-09 NOTE — ED NOTES
ED TO INPATIENT SBAR HANDOFF    Patient Name: Stacie Donato   Preferred Name: Stacie  : 1980  44 y.o.   Family/Caregiver Present: no   Code Status Order: Full Code  PO Status: NPO:No  Telemetry Order: 72 hr  C-SSRS: Risk of Suicide: No Risk  Sitter no   Restraints:   no  Sepsis Risk Score  57    Situation  Chief Complaint   Patient presents with    Shortness of Breath     Pt to ed c/o sob and chest pain that started yesterday.      Brief Description of Patient's Condition: Pt A&Ox4, from home. Independently ambulatory at baseline and now. VS stable. Respirations even, easy, unlabored on room air. Continent to bladder and bowel.  Mental Status: oriented, alert, coherent, logical, thought processes intact, and able to concentrate and follow conversation  Arrived from:Home  Imaging:   CT CHEST PULMONARY EMBOLISM W CONTRAST   Final Result   1. No evidence for pulmonary embolism.   2. Loculated left-sided pleural effusion with enhancement of the pleura   concerning for empyema.   3. Enlarged pulmonary artery suggesting pulmonary arterial hypertension.         XR CHEST PORTABLE   Final Result   No change in the moderate to large left pleural effusion.  Underlying   pneumonia cannot be excluded.  This would require CT.           Abnormal labs:   Abnormal Labs Reviewed   CBC WITH AUTO DIFFERENTIAL - Abnormal; Notable for the following components:       Result Value    WBC 15.0 (*)     RBC 3.99 (*)     Neutrophils Absolute 12.1 (*)     All other components within normal limits   COMPREHENSIVE METABOLIC PANEL W/ REFLEX TO MG FOR LOW K - Abnormal; Notable for the following components:    Sodium 135 (*)     Chloride 94 (*)     Anion Gap 17 (*)     Glucose 212 (*)     BUN 6 (*)     Albumin/Globulin Ratio 0.9 (*)     Total Bilirubin 1.6 (*)     ALT 9 (*)     AST 13 (*)     All other components within normal limits   MAGNESIUM - Abnormal; Notable for the following components:    Magnesium 1.39 (*)     All other components

## 2025-04-09 NOTE — PROGRESS NOTES
Medication Reconciliation    List of medications patient is currently taking is complete.     Source of information: 1. Conversation with patient at bedside                                      2. EPIC records     Notes regarding home medications:   1. Patient confirms Basaglar is 30 units nightly  2. Reports Humalog is on hold and metformin increased to 500 mg BID      Tony Trivedi, [facility]   4/9/2025  3:08 PM

## 2025-04-09 NOTE — PROGRESS NOTES
Pt. Transferred to unit. IV not patent. Pt. Stated she had PICC before. Phlebotomy having issues with blood draws. MD notified. PICC ordered. Pt. Complaining of L sided pain 8/10. Percocet given. VSS. Tele applied. Admission completed. Educated on call light system. Safety measures in place.

## 2025-04-09 NOTE — H&P
Name:  Stacie Donato /Age/Sex: 1980  (44 y.o. female)   MRN & CSN:  [id number] & [id number] Encounter Date/Time: 2025 2:56 PM EDT   Location:  [address] [place] [address] PCP: Samuel Sy MD     Attending:John Mast MD       Stacie Donato is a 44 y.o. female who presented with     Chief Complaint   Patient presents with    Shortness of Breath     Pt to ed c/o sob and chest pain that started yesterday.           Assessment and Plan     Assessment :    Empyema  Recurrent loculated left pleural effusion  Pulmonary arterial hypertension  Leukocytosis  Hypomagnesemia  Type 2 diabetes    Plan :    Started on empiric IV Zosyn and linezolid  ESR CRP Procal ordered  Blood culture in process  Respiratory culture sputum panel ordered  Pulmonology consulted      HPI :      Stacie Donato is a 44 y.o. female with  has a past medical history of Asthma, Fatty liver, Obesity, Psoriasis, Psoriasis, and Type 2 diabetes mellitus. who presented with chief complaints of shortness of breath and chest pain that started yesterday.  Patient reported she was recently hospitalized started to complain about now feels a little worse compared to before.  She complains of pain primarily on the left side.  She reported she had some fever yesterday.  Denies any abdominal pain nausea vomiting or diarrhea    Advance care planning was discussed with the patient for total of 16 minutes.  Full code, DNR CC, DNR CCA, power of  and living will were discussed and patient elected to be full code     Review of Systems:      10 point ROS negative except stated above    Objective:     No intake or output data in the 24 hours ending 25 1456   Vitals:   Vitals:    25 1226 25 1245 25 1310   BP: (!) 165/82 124/73 121/80   Pulse: (!) 112 (!) 102 100   Resp: 14 17 16   Temp: 97.4 °F (36.3 °C)     TempSrc: Oral     SpO2: 99% 97% 96%   Weight: 106.7 kg (235 lb 3.7 oz)     Height: 1.524 m (5')         Physical Exam:

## 2025-04-09 NOTE — ED PROVIDER NOTES
[facility] EMERGENCY DEPARTMENT  EMERGENCY DEPARTMENT ENCOUNTER        Pt Name: Stacie Donato  MRN: [id number]  Birthdate 1980  Date of evaluation: 4/9/2025  Provider: Samuel Chavez MD  PCP: Samuel Sy MD  Note Started: 12:59 PM EDT 4/9/25    CHIEF COMPLAINT       Chief Complaint   Patient presents with    Shortness of Breath     Pt to ed c/o sob and chest pain that started yesterday.        HISTORY OF PRESENT ILLNESS: 1 or more Elements     History from : Patient    Limitations to history : None    Stacie Donato is a 44 y.o. female who presents for shortness of breath and chest pain that started yesterday.  Patient states that she recently had pneumonia with fluid on her lung and completed antibiotics about 10 to 12 days ago.  Patient reports that she had pneumonia and fluid on her lungs.  Had been doing better but still been having some shortness of breath with exertion but significantly worse yesterday and now having pain across her chest.  Some subjective fevers and chills no measured fevers.  Nothing else seems to make symptoms better or worse besides rest.  No abdominal pain no nausea vomiting or diarrhea.  No urinary symptoms.  No cough.    Nursing Notes were all reviewed and agreed with or any disagreements were addressed in the HPI.    REVIEW OF SYSTEMS :      Review of Systems   Constitutional:  Positive for chills and fever.   HENT:  Negative for congestion and sore throat.    Respiratory:  Positive for shortness of breath. Negative for cough.    Cardiovascular:  Positive for chest pain. Negative for palpitations and leg swelling.   Gastrointestinal: Negative.  Negative for abdominal pain.   Genitourinary: Negative.    Musculoskeletal:  Negative for back pain.   Neurological: Negative.  Negative for headaches.       Positives and Pertinent negatives as per HPI.     SURGICAL HISTORY     Past Surgical History:   Procedure Laterality Date    CHOLECYSTECTOMY    other pathology that might be causing the patient's symptoms.  BNP-BNP was ordered to look for signs of CHF, fluid overload  VBG-VBG was ordered to look for acidosis, abnormal blood gases including carbon dioxide, bicarb, oxygenation  EKG-ordered to rule out myocardial infarction, rhythm disturbances, conduction disturbances, LVH, ANDREY, pericarditis, voltage abnormalities, or other pathology that might be causing the patient's symptoms.  Chest x-ray-chest x-ray was ordered to rule out pneumonia, pneumothorax, congestive heart failure, cardiomegaly, chest masses, aortic aneurysm, hiatal hernia, rib fractures, or any other pathology that might be causing the patient's symptoms  Troponin- Troponin was ordered to rule out ACS, heart strain.       ED Course as of 04/09/25 1444   Wed Apr 09, 2025   1408 Chest x-ray with large pleural effusion.  CBC still shows white blood cell count of 15 but lactate is within normal limits.  Patient does not have signs of severe sepsis troponin is negative.  Patient's magnesium is low will replete.  Will plan on readmission likely but awaiting CT chest. [SC]   1441 CT chest shows empyema.  I personally messaged pulmonology Dr. Murrell.  Patient would likely require repeat thoracentesis.  Will give antibiotics at this time.  Will plan on admission. [SC]      ED Course User Index  [SC] Samuel Chavez MD       Patient was given the following medications:  Medications   linezolid (ZYVOX) IVPB 600 mg (has no administration in time range)   piperacillin-tazobactam (ZOSYN) 3,375 mg in sodium chloride 0.9 % 50 mL IVPB (addEASE) (has no administration in time range)   magnesium sulfate 1000 mg in dextrose 5% 100 mL IVPB (has no administration in time range)   sodium chloride 0.9 % bolus 1,000 mL (1,000 mLs IntraVENous New Bag 4/9/25 1309)   iopamidol (ISOVUE-370) 76 % injection 75 mL (75 mLs IntraVENous Given 4/9/25 1329)       Disposition Considerations (tests considered but not done, Shared

## 2025-04-09 NOTE — ED NOTES
2 unsuccessful culture attempts by ED ricci Castro. Pt tolerated well. No antibiotics ordered as of now. Holding draw until required for pt comfort.

## 2025-04-09 NOTE — PROGRESS NOTES
4 Eyes Skin Assessment     NAME:  Stacie Donato  YOB: 1980  MEDICAL RECORD NUMBER:  [id number]    The patient is being assessed for  Admission    I agree that at least one RN has performed a thorough Head to Toe Skin Assessment on the patient. ALL assessment sites listed below have been assessed.      Areas assessed by both nurses:    Head, Face, Ears, Shoulders, Back, Chest, Arms, Elbows, Hands, Sacrum. Buttock, Coccyx, Ischium, Legs. Feet and Heels, and Under Medical Devices         Does the Patient have a Wound? No noted wound(s)       David Prevention initiated by RN: No  Wound Care Orders initiated by RN: No    Pressure Injury (Stage 3,4, Unstageable, DTI, NWPT, and Complex wounds) if present, place Wound referral order by RN under : No    New Ostomies, if present place, Ostomy referral order under : No     Nurse 1 eSignature: Electronically signed by Jimena Steele RN on 4/9/25 at 5:50 PM EDT    **SHARE this note so that the co-signing nurse can place an eSignature**    Nurse 2 eSignature: {Esignature:[id number]}

## 2025-04-10 ENCOUNTER — APPOINTMENT (OUTPATIENT)
Dept: CT IMAGING | Age: 45
End: 2025-04-10
Payer: COMMERCIAL

## 2025-04-10 ENCOUNTER — APPOINTMENT (OUTPATIENT)
Dept: INTERVENTIONAL RADIOLOGY/VASCULAR | Age: 45
End: 2025-04-10
Payer: COMMERCIAL

## 2025-04-10 ENCOUNTER — APPOINTMENT (OUTPATIENT)
Dept: ULTRASOUND IMAGING | Age: 45
End: 2025-04-10
Payer: COMMERCIAL

## 2025-04-10 LAB
ANION GAP SERPL CALCULATED.3IONS-SCNC: 12 MMOL/L (ref 3–16)
APPEARANCE FLUID: NORMAL
BASOPHILS # BLD: 0 K/UL (ref 0–0.2)
BASOPHILS NFR BLD: 0.4 %
BDY FLUID QUALITY: NORMAL
BUN SERPL-MCNC: 8 MG/DL (ref 7–20)
CALCIUM SERPL-MCNC: 8.4 MG/DL (ref 8.3–10.6)
CELL COUNT FLUID TYPE: NORMAL
CHLORIDE SERPL-SCNC: 95 MMOL/L (ref 99–110)
CO2 SERPL-SCNC: 26 MMOL/L (ref 21–32)
COLOR FLUID: NORMAL
CREAT SERPL-MCNC: 0.5 MG/DL (ref 0.6–1.1)
CRP SERPL-MCNC: 143 MG/L (ref 0–5.1)
DEPRECATED RDW RBC AUTO: 13.6 % (ref 12.4–15.4)
EOSINOPHIL # BLD: 0.1 K/UL (ref 0–0.6)
EOSINOPHIL NFR BLD: 1 %
EOSINOPHIL NFR FLD MANUAL: 1 %
GFR SERPLBLD CREATININE-BSD FMLA CKD-EPI: >90 ML/MIN/{1.73_M2}
GLUCOSE BLD-MCNC: 185 MG/DL (ref 70–99)
GLUCOSE BLD-MCNC: 207 MG/DL (ref 70–99)
GLUCOSE BLD-MCNC: 259 MG/DL (ref 70–99)
GLUCOSE BLD-MCNC: 275 MG/DL (ref 70–99)
GLUCOSE FLD-MCNC: <10 MG/DL
GLUCOSE SERPL-MCNC: 211 MG/DL (ref 70–99)
HCT VFR BLD AUTO: 32 % (ref 36–48)
HGB BLD-MCNC: 10.9 G/DL (ref 12–16)
LDH FLD L TO P-CCNC: >2500 U/L
LYMPHOCYTES # BLD: 1.1 K/UL (ref 1–5.1)
LYMPHOCYTES NFR BLD: 10.2 %
LYMPHOCYTES NFR FLD: 3 %
MACROPHAGES # FLD: 1 %
MAGNESIUM SERPL-MCNC: 1.58 MG/DL (ref 1.8–2.4)
MCH RBC QN AUTO: 31.9 PG (ref 26–34)
MCHC RBC AUTO-ENTMCNC: 34.1 G/DL (ref 31–36)
MCV RBC AUTO: 93.4 FL (ref 80–100)
MONOCYTES # BLD: 1 K/UL (ref 0–1.3)
MONOCYTES NFR BLD: 9.3 %
MONOCYTES NFR FLD: 2 %
NEUTROPHIL, FLUID: 93 %
NEUTROPHILS # BLD: 8.7 K/UL (ref 1.7–7.7)
NEUTROPHILS NFR BLD: 79.1 %
NUC CELL # FLD: NORMAL /CUMM
PERFORMED ON: ABNORMAL
PH FLD STRIP: 7 [PH]
PLATELET # BLD AUTO: 330 K/UL (ref 135–450)
PMV BLD AUTO: 8.2 FL (ref 5–10.5)
POTASSIUM SERPL-SCNC: 3.5 MMOL/L (ref 3.5–5.1)
PROT FLD-MCNC: 5.2 G/DL
RBC # BLD AUTO: 3.42 M/UL (ref 4–5.2)
RBC FLUID: NORMAL /CUMM
SODIUM SERPL-SCNC: 133 MMOL/L (ref 136–145)
SPECIMEN SOURCE FLD: NORMAL
TOTAL CELLS COUNTED FLD: 100
WBC # BLD AUTO: 11 K/UL (ref 4–11)

## 2025-04-10 PROCEDURE — 80048 BASIC METABOLIC PNL TOTAL CA: CPT

## 2025-04-10 PROCEDURE — 6360000002 HC RX W HCPCS: Performed by: RADIOLOGY

## 2025-04-10 PROCEDURE — 83986 ASSAY PH BODY FLUID NOS: CPT

## 2025-04-10 PROCEDURE — 87206 SMEAR FLUORESCENT/ACID STAI: CPT

## 2025-04-10 PROCEDURE — 87116 MYCOBACTERIA CULTURE: CPT

## 2025-04-10 PROCEDURE — 2580000003 HC RX 258

## 2025-04-10 PROCEDURE — C1729 CATH, DRAINAGE: HCPCS

## 2025-04-10 PROCEDURE — 6370000000 HC RX 637 (ALT 250 FOR IP)

## 2025-04-10 PROCEDURE — 0W9B30Z DRAINAGE OF LEFT PLEURAL CAVITY WITH DRAINAGE DEVICE, PERCUTANEOUS APPROACH: ICD-10-PCS | Performed by: INTERNAL MEDICINE

## 2025-04-10 PROCEDURE — 87205 SMEAR GRAM STAIN: CPT

## 2025-04-10 PROCEDURE — 87070 CULTURE OTHR SPECIMN AEROBIC: CPT

## 2025-04-10 PROCEDURE — 84157 ASSAY OF PROTEIN OTHER: CPT

## 2025-04-10 PROCEDURE — 87077 CULTURE AEROBIC IDENTIFY: CPT

## 2025-04-10 PROCEDURE — 88112 CYTOPATH CELL ENHANCE TECH: CPT

## 2025-04-10 PROCEDURE — 6360000002 HC RX W HCPCS

## 2025-04-10 PROCEDURE — 89051 BODY FLUID CELL COUNT: CPT

## 2025-04-10 PROCEDURE — 1200000000 HC SEMI PRIVATE

## 2025-04-10 PROCEDURE — 2500000003 HC RX 250 WO HCPCS

## 2025-04-10 PROCEDURE — 94760 N-INVAS EAR/PLS OXIMETRY 1: CPT

## 2025-04-10 PROCEDURE — 85025 COMPLETE CBC W/AUTO DIFF WBC: CPT

## 2025-04-10 PROCEDURE — 6370000000 HC RX 637 (ALT 250 FOR IP): Performed by: INTERNAL MEDICINE

## 2025-04-10 PROCEDURE — 88305 TISSUE EXAM BY PATHOLOGIST: CPT

## 2025-04-10 PROCEDURE — 87102 FUNGUS ISOLATION CULTURE: CPT

## 2025-04-10 PROCEDURE — C1769 GUIDE WIRE: HCPCS

## 2025-04-10 PROCEDURE — 82945 GLUCOSE OTHER FLUID: CPT

## 2025-04-10 PROCEDURE — 87186 SC STD MICRODIL/AGAR DIL: CPT

## 2025-04-10 PROCEDURE — 99223 1ST HOSP IP/OBS HIGH 75: CPT | Performed by: INTERNAL MEDICINE

## 2025-04-10 PROCEDURE — 6370000000 HC RX 637 (ALT 250 FOR IP): Performed by: NURSE PRACTITIONER

## 2025-04-10 PROCEDURE — 83615 LACTATE (LD) (LDH) ENZYME: CPT

## 2025-04-10 PROCEDURE — 83735 ASSAY OF MAGNESIUM: CPT

## 2025-04-10 PROCEDURE — 86140 C-REACTIVE PROTEIN: CPT

## 2025-04-10 RX ORDER — MIDAZOLAM HYDROCHLORIDE 1 MG/ML
INJECTION, SOLUTION INTRAMUSCULAR; INTRAVENOUS PRN
Status: COMPLETED | OUTPATIENT
Start: 2025-04-10 | End: 2025-04-10

## 2025-04-10 RX ORDER — PREDNISONE 20 MG/1
20 TABLET ORAL DAILY
Status: DISCONTINUED | OUTPATIENT
Start: 2025-04-10 | End: 2025-04-11

## 2025-04-10 RX ADMIN — Medication 6 MG: at 21:27

## 2025-04-10 RX ADMIN — LINEZOLID 600 MG: 600 INJECTION, SOLUTION INTRAVENOUS at 04:00

## 2025-04-10 RX ADMIN — INSULIN LISPRO 11 UNITS: 100 INJECTION, SOLUTION INTRAVENOUS; SUBCUTANEOUS at 18:09

## 2025-04-10 RX ADMIN — LINEZOLID 600 MG: 600 INJECTION, SOLUTION INTRAVENOUS at 15:22

## 2025-04-10 RX ADMIN — MAGNESIUM SULFATE HEPTAHYDRATE 2000 MG: 40 INJECTION, SOLUTION INTRAVENOUS at 15:21

## 2025-04-10 RX ADMIN — SODIUM CHLORIDE, PRESERVATIVE FREE 10 ML: 5 INJECTION INTRAVENOUS at 20:07

## 2025-04-10 RX ADMIN — INSULIN GLARGINE 30 UNITS: 100 INJECTION, SOLUTION SUBCUTANEOUS at 19:59

## 2025-04-10 RX ADMIN — OXYCODONE AND ACETAMINOPHEN 1 TABLET: 325; 5 TABLET ORAL at 05:10

## 2025-04-10 RX ADMIN — PIPERACILLIN AND TAZOBACTAM 3375 MG: 3; .375 INJECTION, POWDER, LYOPHILIZED, FOR SOLUTION INTRAVENOUS at 05:11

## 2025-04-10 RX ADMIN — ACETAMINOPHEN 650 MG: 325 TABLET ORAL at 19:59

## 2025-04-10 RX ADMIN — OXYCODONE AND ACETAMINOPHEN 1 TABLET: 325; 5 TABLET ORAL at 21:27

## 2025-04-10 RX ADMIN — PREDNISONE 20 MG: 20 TABLET ORAL at 10:37

## 2025-04-10 RX ADMIN — MIDAZOLAM 2 MG: 1 INJECTION INTRAMUSCULAR; INTRAVENOUS at 13:56

## 2025-04-10 RX ADMIN — ENOXAPARIN SODIUM 30 MG: 100 INJECTION SUBCUTANEOUS at 19:59

## 2025-04-10 RX ADMIN — INSULIN LISPRO 11 UNITS: 100 INJECTION, SOLUTION INTRAVENOUS; SUBCUTANEOUS at 09:59

## 2025-04-10 RX ADMIN — PIPERACILLIN AND TAZOBACTAM 3375 MG: 3; .375 INJECTION, POWDER, LYOPHILIZED, FOR SOLUTION INTRAVENOUS at 21:37

## 2025-04-10 RX ADMIN — SODIUM CHLORIDE, PRESERVATIVE FREE 10 ML: 5 INJECTION INTRAVENOUS at 20:02

## 2025-04-10 RX ADMIN — OXYCODONE AND ACETAMINOPHEN 1 TABLET: 325; 5 TABLET ORAL at 15:22

## 2025-04-10 ASSESSMENT — PAIN DESCRIPTION - ORIENTATION
ORIENTATION: LEFT

## 2025-04-10 ASSESSMENT — PAIN DESCRIPTION - DESCRIPTORS
DESCRIPTORS: ACHING

## 2025-04-10 ASSESSMENT — PAIN SCALES - GENERAL
PAINLEVEL_OUTOF10: 8
PAINLEVEL_OUTOF10: 8
PAINLEVEL_OUTOF10: 6
PAINLEVEL_OUTOF10: 8
PAINLEVEL_OUTOF10: 3
PAINLEVEL_OUTOF10: 10

## 2025-04-10 ASSESSMENT — PAIN DESCRIPTION - LOCATION
LOCATION: BACK
LOCATION: BACK
LOCATION: CHEST;BACK
LOCATION: CHEST

## 2025-04-10 NOTE — FLOWSHEET NOTE
Patient requesting medication for sleep.  Secure message sent to Stacey Avilez NP per patient request, new order obtained for melatonin, given as ordered.

## 2025-04-10 NOTE — PROGRESS NOTES
[place]2.[place]    [facility] [place]      Name:  Stacie Donato /Age/Sex: 1980  (44 y.o. female)   MRN & CSN:  [id number] & [id number] Encounter Date/Time: 4/10/2025 12:05 PM EDT   Location:  [id number] PCP: Samuel Sy MD     Attending:Aj Craft MD       Hospital Day: 2    Assessment and Recommendations   Stacie Donato is a 44 y.o. female with pmh of asthma, diabetes mellitus, obesity who presents with Empyema (HCC)      Patient was examined this morning.  Presented here with left-sided chest pain.  Patient was recently admitted with left-sided empyema.  Patient was discharged home on antibiotics, returned when her symptoms return.  Left-sided chest pain, fevers, chills  CT chest was obtained which showing continuation of left-sided empyema    Pulmonology was consulted recommended CT-guided chest tube  Acapella, incentive parameter    Labs obtained this morning showing sodium 133, potassium 3.5, creatinine 0.5, GFR more than 90, magnesium 1.58 will replace, WBC 11.0, H&H stable 10.9, 32.0 we will continue to monitor    Plan:   Left-sided empyema   Zosyn 3.375 mg every 8 hours  Zyvox 600 mg twice daily  Prednisone 20 mg daily  Pulmonology consult    2.  Diabetes mellitus  Hemoglobin A1c was last obtained on 3/8/2025 to be 10.3  Lantus 30 units daily  Humalog 11 units 3 times a day with meal  Insulin sliding scale    3.  Hypomagnesemia  Present with magnesium at 1.5.  Will replace with magnesium protocol  Will monitor with routine labs    Will continue to follow, monitor and manage chronic medical condition with medication listed below    Diet ADULT DIET; Regular  ADULT ORAL NUTRITION SUPPLEMENT; Breakfast; Standard High Calorie/High Protein Oral Supplement   DVT Prophylaxis [x] Lovenox, []  Heparin, [] SCDs, [x] Ambulation,  [] Eliquis, [] Xarelto  [] Coumadin   Code Status Full Code   Disposition From: Home  Expected Disposition: Home  Estimated Date of Discharge: 1-2 days  Patient

## 2025-04-10 NOTE — CARE COORDINATION
Readmission Assessment  Number of Days since last admission?: 1-7 days  Previous Disposition: Home with Family  Who is being Interviewed: Patient  What was the patient's/caregiver's perception as to why they think they needed to return back to the hospital?: Other (Comment) (symptoms still present)  Did you visit your Primary Care Physician after you left the hospital, before you returned this time?: No  Why weren't you able to visit your PCP?: Did not have an appointment  Did you see a specialist, such as Cardiac, Pulmonary, Orthopedic Physician, etc. after you left the hospital?: No  Who advised the patient to return to the hospital?: Self-referral  Does the patient report anything that got in the way of taking their medications?: No  In our efforts to provide the best possible care to you and others like you, can you think of anything that we could have done to help you after you left the hospital the first time, so that you might not have needed to return so soon?: Other (Comment) (na)     Case Management Assessment  Initial Evaluation    Date/Time of Evaluation: 4/10/2025 12:54 PM  Assessment Completed by: SHOAIB [place]    If patient is discharged prior to next notation, then this note serves as note for discharge by case management.    Patient Name: Stacie Donato                   YOB: 1980  Diagnosis: Hypomagnesemia [E83.42]  Empyema (HCC) [J86.9]  Recurrent left pleural effusion [J90]                   Date / Time: 4/9/2025 12:24 PM    Patient Admission Status: Inpatient   Readmission Risk (Low < 19, Mod (19-27), High > 27): Readmission Risk Score: 17.7    Current PCP: Samuel Sy MD  PCP verified by MANJIT? (P) Yes    Chart Reviewed: Yes      History Provided by: (P) Patient  Patient Orientation: (P) Alert and Oriented, Person, Place    Patient Cognition: (P) Alert    Hospitalization in the last 30 days (Readmission):  Yes    If yes, Readmission Assessment in  Navigator will be  completed.    Advance Directives:      Code Status: Full Code   Patient's Primary Decision Maker is: (P) Legal Next of Kin    Primary Decision Maker: Jasmin Galarza - [phone]    Discharge Planning:    Patient lives with: (P) Family Members Type of Home: (P) Apartment  Primary Care Giver: (P) Self  Patient Support Systems include: (P) Family Members   Current Financial resources: (P) None  Current community resources: (P) None  Current services prior to admission: (P) None            Current DME:              Type of Home Care services:  (P) None    ADLS  Prior functional level: (P) Independent in ADLs/IADLs  Current functional level: (P) Independent in ADLs/IADLs    PT AM-PAC:   /24  OT AM-PAC:   /24    Family can provide assistance at DC: (P) Yes  Would you like Case Management to discuss the discharge plan with any other family members/significant others, and if so, who? (P) No  Plans to Return to Present Housing: (P) Yes  Other Identified Issues/Barriers to RETURNING to current housing: na  Potential Assistance needed at discharge: (P) N/A            Potential DME:    Patient expects to discharge to: (P) Apartment  Plan for transportation at discharge: (P) Self    Financial    Payor: JESSICA / Plan: JESSICA BERRY LJ / Product Type: *No Product type* /     Does insurance require precert for SNF: Yes    Potential assistance Purchasing Medications: (P) No  Meds-to-Beds request:        [facility] [address] [place] [address] -  [phone] - F [phone]  [address] [address]  Phone: [phone] Fax: [phone]    [facility] [address] [place] [address] -  [phone] - F [phone]  [address] [place], [place] [address]  Phone: [phone] Fax: [phone]      Notes:    Factors facilitating achievement of predicted outcomes: Family support, Motivated, Cooperative, and

## 2025-04-10 NOTE — CONSULTS
VIEW OF THE CHEST    4/9/2025 1:17 pm    COMPARISON:  03/18/2025    HISTORY:  ORDERING SYSTEM PROVIDED HISTORY: Shortness of breath, Chest pain, hx of  recent pneumonia and pleural effusion  TECHNOLOGIST PROVIDED HISTORY:  Reason for exam:->Shortness of breath, Chest pain, hx of recent pneumonia and  pleural effusion    FINDINGS:  There is a no change in the moderate 2 large left pleural effusion.  There is  some platelike atelectasis in the left mid lung zone.  Right lung is  unremarkable.  Visualized borders of the heart and mediastinum are  unremarkable.    Impression  No change in the moderate to large left pleural effusion.  Underlying  pneumonia cannot be excluded.  This would require CT.                     Husam Sutherland MD, M.D.            4/10/2025, 10:50 AM

## 2025-04-10 NOTE — PLAN OF CARE
Problem: Chronic Conditions and Co-morbidities  Goal: Patient's chronic conditions and co-morbidity symptoms are monitored and maintained or improved  4/10/2025 1114 by Feli Lovett RN  Outcome: Progressing  4/10/2025 0029 by Katelynn Huston RN  Outcome: Progressing  Flowsheets (Taken 4/9/2025 2202)  Care Plan - Patient's Chronic Conditions and Co-Morbidity Symptoms are Monitored and Maintained or Improved:   Monitor and assess patient's chronic conditions and comorbid symptoms for stability, deterioration, or improvement   Collaborate with multidisciplinary team to address chronic and comorbid conditions and prevent exacerbation or deterioration     Problem: Discharge Planning  Goal: Discharge to home or other facility with appropriate resources  4/10/2025 1114 by Feli Lovett RN  Outcome: Progressing  4/10/2025 0029 by Katelynn Huston RN  Outcome: Progressing  Flowsheets (Taken 4/9/2025 2202)  Discharge to home or other facility with appropriate resources:   Identify barriers to discharge with patient and caregiver   Arrange for needed discharge resources and transportation as appropriate     Problem: Pain  Goal: Verbalizes/displays adequate comfort level or baseline comfort level  4/10/2025 1114 by Feli Lovett RN  Outcome: Progressing  4/10/2025 0029 by Katelynn Huston RN  Outcome: Progressing     Problem: Respiratory - Adult  Goal: Achieves optimal ventilation and oxygenation  4/10/2025 0029 by Katelynn Huston RN  Reactivated  Flowsheets (Taken 4/9/2025 2202)  Achieves optimal ventilation and oxygenation:   Assess for changes in respiratory status   Assess for changes in mentation and behavior   Encourage broncho-pulmonary hygiene including cough, deep breathe, incentive spirometry   Assess and instruct to report shortness of breath or any respiratory difficulty   Respiratory therapy support as indicated     Problem: Skin/Tissue Integrity - Adult  Goal: Skin integrity remains  intact  4/10/2025 1114 by Feli Lovett RN  Outcome: Progressing  4/10/2025 0029 by Katelynn Huston RN  Reactivated  Flowsheets  Taken 4/10/2025 0016  Skin Integrity Remains Intact:   Monitor for areas of redness and/or skin breakdown   Assess vascular access sites hourly  Taken 4/9/2025 2202  Skin Integrity Remains Intact:   Monitor for areas of redness and/or skin breakdown   Assess vascular access sites hourly     Problem: Infection - Adult  Goal: Absence of infection at discharge  4/10/2025 1114 by Feli Lovett RN  Outcome: Progressing  4/10/2025 0029 by Katelynn Huston RN  Reactivated  Flowsheets (Taken 4/9/2025 2202)  Absence of infection at discharge:   Assess and monitor for signs and symptoms of infection   Monitor lab/diagnostic results   Monitor all insertion sites i.e., indwelling lines, tubes and drains   [place] appropriate cooling/warming therapies per order   Administer medications as ordered   Instruct and encourage patient and family to use good hand hygiene technique   Identify and instruct in appropriate isolation precautions for identified infection/condition

## 2025-04-10 NOTE — PROGRESS NOTES
Patient is back from IR  with new Chest tube inserted on left side of her back. Dressing is clean, dry and intact. Gravity drainage chest tube in upright position  and below level of the chest. Tubing is free of kinks and patent. No air leak identified. 40 ml of milky drainage noted. Patient is calm and comfortable at this time, breathing is unlabored, easy and even . Ongoing monitoring.

## 2025-04-10 NOTE — PROGRESS NOTES
Arrived to place PICC after chart review. Pre-procedure and timeout done with ANGELICA Pace. Discussed limitations of placement and allergies. Consent confirmed. Procedure explained to pt, including risks and benefits. All questions answered. Pt verbalized understanding.      Vessels easily collapsible upon assessment. No difficulty accessing Basilic vein. Blood free flowing and non-pulsatile. Guidewire, introducer, and catheter all easily inserted. PICC placement verified using 3CG technology as evidenced by peaked P-waves with no initial deflection. Line has brisk blood return and flushes easily.     OK to use PICC. Please use new IV tubing when connecting to the newly placed central line.      Patient tolerated sterile procedure well. Bed left in low position with belongings and call light within reach. Educated on line care. Handoff to bedside RN.      Please call with any questions or concerns. The  will direct you to the PICC RN that is on call.      [phone]

## 2025-04-10 NOTE — PLAN OF CARE
Problem: Chronic Conditions and Co-morbidities  Goal: Patient's chronic conditions and co-morbidity symptoms are monitored and maintained or improved  Outcome: Progressing  Flowsheets (Taken 4/9/2025 2202)  Care Plan - Patient's Chronic Conditions and Co-Morbidity Symptoms are Monitored and Maintained or Improved:   Monitor and assess patient's chronic conditions and comorbid symptoms for stability, deterioration, or improvement   Collaborate with multidisciplinary team to address chronic and comorbid conditions and prevent exacerbation or deterioration     Problem: Discharge Planning  Goal: Discharge to home or other facility with appropriate resources  Outcome: Progressing  Flowsheets (Taken 4/9/2025 2202)  Discharge to home or other facility with appropriate resources:   Identify barriers to discharge with patient and caregiver   Arrange for needed discharge resources and transportation as appropriate     Problem: Pain  Goal: Verbalizes/displays adequate comfort level or baseline comfort level  Outcome: Progressing

## 2025-04-11 LAB
ANION GAP SERPL CALCULATED.3IONS-SCNC: 10 MMOL/L (ref 3–16)
BASOPHILS # BLD: 0 K/UL (ref 0–0.2)
BASOPHILS NFR BLD: 0.4 %
BUN SERPL-MCNC: 8 MG/DL (ref 7–20)
CALCIUM SERPL-MCNC: 8.7 MG/DL (ref 8.3–10.6)
CHLORIDE SERPL-SCNC: 101 MMOL/L (ref 99–110)
CO2 SERPL-SCNC: 29 MMOL/L (ref 21–32)
CREAT SERPL-MCNC: 0.5 MG/DL (ref 0.6–1.1)
DEPRECATED RDW RBC AUTO: 13.4 % (ref 12.4–15.4)
EOSINOPHIL # BLD: 0.1 K/UL (ref 0–0.6)
EOSINOPHIL NFR BLD: 0.9 %
GFR SERPLBLD CREATININE-BSD FMLA CKD-EPI: >90 ML/MIN/{1.73_M2}
GLUCOSE BLD-MCNC: 157 MG/DL (ref 70–99)
GLUCOSE BLD-MCNC: 165 MG/DL (ref 70–99)
GLUCOSE BLD-MCNC: 260 MG/DL (ref 70–99)
GLUCOSE BLD-MCNC: 309 MG/DL (ref 70–99)
GLUCOSE SERPL-MCNC: 168 MG/DL (ref 70–99)
HCT VFR BLD AUTO: 29.6 % (ref 36–48)
HGB BLD-MCNC: 10.3 G/DL (ref 12–16)
LYMPHOCYTES # BLD: 1.6 K/UL (ref 1–5.1)
LYMPHOCYTES NFR BLD: 17.2 %
MAGNESIUM SERPL-MCNC: 2.2 MG/DL (ref 1.8–2.4)
MCH RBC QN AUTO: 32.4 PG (ref 26–34)
MCHC RBC AUTO-ENTMCNC: 34.9 G/DL (ref 31–36)
MCV RBC AUTO: 92.9 FL (ref 80–100)
MONOCYTES # BLD: 0.6 K/UL (ref 0–1.3)
MONOCYTES NFR BLD: 6.2 %
NEUTROPHILS # BLD: 7.2 K/UL (ref 1.7–7.7)
NEUTROPHILS NFR BLD: 75.3 %
PERFORMED ON: ABNORMAL
PLATELET # BLD AUTO: 344 K/UL (ref 135–450)
PMV BLD AUTO: 7.8 FL (ref 5–10.5)
POTASSIUM SERPL-SCNC: 3.6 MMOL/L (ref 3.5–5.1)
RBC # BLD AUTO: 3.19 M/UL (ref 4–5.2)
SODIUM SERPL-SCNC: 140 MMOL/L (ref 136–145)
WBC # BLD AUTO: 9.6 K/UL (ref 4–11)

## 2025-04-11 PROCEDURE — 6360000002 HC RX W HCPCS

## 2025-04-11 PROCEDURE — 2580000003 HC RX 258

## 2025-04-11 PROCEDURE — 80048 BASIC METABOLIC PNL TOTAL CA: CPT

## 2025-04-11 PROCEDURE — 6370000000 HC RX 637 (ALT 250 FOR IP): Performed by: INTERNAL MEDICINE

## 2025-04-11 PROCEDURE — 6370000000 HC RX 637 (ALT 250 FOR IP): Performed by: NURSE PRACTITIONER

## 2025-04-11 PROCEDURE — 83735 ASSAY OF MAGNESIUM: CPT

## 2025-04-11 PROCEDURE — 94760 N-INVAS EAR/PLS OXIMETRY 1: CPT

## 2025-04-11 PROCEDURE — 2500000003 HC RX 250 WO HCPCS

## 2025-04-11 PROCEDURE — 6370000000 HC RX 637 (ALT 250 FOR IP)

## 2025-04-11 PROCEDURE — 99233 SBSQ HOSP IP/OBS HIGH 50: CPT | Performed by: INTERNAL MEDICINE

## 2025-04-11 PROCEDURE — 1200000000 HC SEMI PRIVATE

## 2025-04-11 PROCEDURE — 85025 COMPLETE CBC W/AUTO DIFF WBC: CPT

## 2025-04-11 RX ORDER — PREDNISONE 20 MG/1
40 TABLET ORAL DAILY
Status: DISCONTINUED | OUTPATIENT
Start: 2025-04-12 | End: 2025-04-13

## 2025-04-11 RX ADMIN — INSULIN GLARGINE 30 UNITS: 100 INJECTION, SOLUTION SUBCUTANEOUS at 20:55

## 2025-04-11 RX ADMIN — ENOXAPARIN SODIUM 30 MG: 100 INJECTION SUBCUTANEOUS at 08:21

## 2025-04-11 RX ADMIN — Medication 6 MG: at 21:36

## 2025-04-11 RX ADMIN — LINEZOLID 600 MG: 600 INJECTION, SOLUTION INTRAVENOUS at 04:06

## 2025-04-11 RX ADMIN — ENOXAPARIN SODIUM 30 MG: 100 INJECTION SUBCUTANEOUS at 20:53

## 2025-04-11 RX ADMIN — PIPERACILLIN AND TAZOBACTAM 4500 MG: 4; .5 INJECTION, POWDER, LYOPHILIZED, FOR SOLUTION INTRAVENOUS at 21:39

## 2025-04-11 RX ADMIN — PIPERACILLIN AND TAZOBACTAM 3375 MG: 3; .375 INJECTION, POWDER, LYOPHILIZED, FOR SOLUTION INTRAVENOUS at 06:14

## 2025-04-11 RX ADMIN — INSULIN LISPRO 11 UNITS: 100 INJECTION, SOLUTION INTRAVENOUS; SUBCUTANEOUS at 18:09

## 2025-04-11 RX ADMIN — SODIUM CHLORIDE, PRESERVATIVE FREE 10 ML: 5 INJECTION INTRAVENOUS at 21:36

## 2025-04-11 RX ADMIN — OXYCODONE AND ACETAMINOPHEN 1 TABLET: 325; 5 TABLET ORAL at 10:42

## 2025-04-11 RX ADMIN — PREDNISONE 20 MG: 20 TABLET ORAL at 08:21

## 2025-04-11 RX ADMIN — OXYCODONE AND ACETAMINOPHEN 1 TABLET: 325; 5 TABLET ORAL at 04:03

## 2025-04-11 RX ADMIN — OXYCODONE AND ACETAMINOPHEN 1 TABLET: 325; 5 TABLET ORAL at 17:07

## 2025-04-11 RX ADMIN — PIPERACILLIN AND TAZOBACTAM 4500 MG: 4; .5 INJECTION, POWDER, LYOPHILIZED, FOR SOLUTION INTRAVENOUS at 15:06

## 2025-04-11 RX ADMIN — INSULIN LISPRO 11 UNITS: 100 INJECTION, SOLUTION INTRAVENOUS; SUBCUTANEOUS at 08:23

## 2025-04-11 RX ADMIN — INSULIN LISPRO 11 UNITS: 100 INJECTION, SOLUTION INTRAVENOUS; SUBCUTANEOUS at 12:50

## 2025-04-11 RX ADMIN — OXYCODONE AND ACETAMINOPHEN 1 TABLET: 325; 5 TABLET ORAL at 23:56

## 2025-04-11 RX ADMIN — LINEZOLID 600 MG: 600 INJECTION, SOLUTION INTRAVENOUS at 16:17

## 2025-04-11 ASSESSMENT — PAIN DESCRIPTION - FREQUENCY
FREQUENCY: CONTINUOUS
FREQUENCY: INTERMITTENT

## 2025-04-11 ASSESSMENT — PAIN SCALES - GENERAL
PAINLEVEL_OUTOF10: 10
PAINLEVEL_OUTOF10: 8
PAINLEVEL_OUTOF10: 10
PAINLEVEL_OUTOF10: 8

## 2025-04-11 ASSESSMENT — PAIN DESCRIPTION - ORIENTATION
ORIENTATION: LEFT

## 2025-04-11 ASSESSMENT — PAIN DESCRIPTION - LOCATION
LOCATION: FLANK
LOCATION: BACK
LOCATION: BACK
LOCATION: BACK;ARM

## 2025-04-11 ASSESSMENT — PAIN DESCRIPTION - PAIN TYPE: TYPE: ACUTE PAIN

## 2025-04-11 ASSESSMENT — PAIN DESCRIPTION - DESCRIPTORS
DESCRIPTORS: ACHING
DESCRIPTORS: ACHING;DISCOMFORT
DESCRIPTORS: ACHING;SHARP
DESCRIPTORS: ACHING;DISCOMFORT

## 2025-04-11 ASSESSMENT — PAIN DESCRIPTION - ONSET: ONSET: ON-GOING

## 2025-04-11 NOTE — PLAN OF CARE
Problem: Chronic Conditions and Co-morbidities  Goal: Patient's chronic conditions and co-morbidity symptoms are monitored and maintained or improved  4/10/2025 2314 by Fercho Parker RN  Outcome: Progressing  4/10/2025 1114 by Feli Lovett RN  Outcome: Progressing     Problem: Discharge Planning  Goal: Discharge to home or other facility with appropriate resources  4/10/2025 2314 by Fercho Parker RN  Outcome: Progressing  4/10/2025 1114 by Feli Lovett RN  Outcome: Progressing     Problem: Pain  Goal: Verbalizes/displays adequate comfort level or baseline comfort level  4/10/2025 2314 by Fercho Parker RN  Outcome: Progressing  4/10/2025 1114 by Feli Lovett RN  Outcome: Progressing     Problem: Respiratory - Adult  Goal: Achieves optimal ventilation and oxygenation  Outcome: Progressing     Problem: Skin/Tissue Integrity - Adult  Goal: Skin integrity remains intact  4/10/2025 2314 by Fercho Parker RN  Outcome: Progressing  4/10/2025 1114 by Feli Lovett RN  Outcome: Progressing     Problem: Infection - Adult  Goal: Absence of infection at discharge  4/10/2025 2314 by Fercho Parker RN  Outcome: Progressing  4/10/2025 1114 by Feli Lovett RN  Outcome: Progressing     Problem: Gastrointestinal - Adult  Goal: Minimal or absence of nausea and vomiting  Outcome: Progressing  Goal: Maintains or returns to baseline bowel function  Outcome: Progressing  Goal: Maintains adequate nutritional intake  Outcome: Progressing     Problem: Genitourinary - Adult  Goal: Absence of urinary retention  Outcome: Progressing  Goal: Urinary catheter remains patent  Outcome: Progressing

## 2025-04-11 NOTE — PROGRESS NOTES
New order received from pulmonologist for suction at 20 cm. Order completed at this time, continuous chest tube suction at 20 cm in place. Ongoing monitoring.

## 2025-04-11 NOTE — CARE COORDINATION
Chart review done rounds completed. Likely another day, watching IVAB and needs.     Rey Noguera LMSW, [facility] Social Work Case Management   Phone: [phone]  Fax: [phone]

## 2025-04-11 NOTE — PROGRESS NOTES
Clinical Pharmacy Note  Renal Dose Adjustment    Stacie Donato is receiving Zosyn for pneumonia .  This medication is renally eliminated.  Based on the patient's Estimated Creatinine Clearance: 161 mL/min (A) (based on SCr of 0.5 mg/dL (L)). and urine output, the dose has been adjusted to Zosyn 4.5 g Q8H extended infusion for BMI > 40 per protocol.    Pharmacy will continue to monitor and adjust dose as needed for changes in renal function.    Estela Chaney [facility], PharmD, 4/11/2025 12:58 PM

## 2025-04-11 NOTE — PLAN OF CARE
Problem: Gastrointestinal - Adult  Goal: Minimal or absence of nausea and vomiting  4/10/2025 2318 by Fercho Parker RN  Outcome: Completed  4/10/2025 2314 by Fercho Parker RN  Outcome: Progressing  Goal: Maintains or returns to baseline bowel function  4/10/2025 2318 by Fercho Parker RN  Outcome: Completed  4/10/2025 2314 by Fercho Parker RN  Outcome: Progressing  Goal: Maintains adequate nutritional intake  4/10/2025 2318 by Fercho Parker RN  Outcome: Completed  4/10/2025 2314 by Fercho Parker RN  Outcome: Progressing     Problem: Genitourinary - Adult  Goal: Absence of urinary retention  4/10/2025 2318 by Fercho Parker RN  Outcome: Completed  4/10/2025 2314 by Fercho Parker RN  Outcome: Progressing  Goal: Urinary catheter remains patent  4/10/2025 2318 by Fercho Parker RN  Outcome: Completed  4/10/2025 2314 by Fercho Parker RN  Outcome: Progressing

## 2025-04-11 NOTE — PROGRESS NOTES
Comprehensive Nutrition Assessment    Type and Reason for Visit:  Initial, Positive nutrition screen    Nutrition Recommendations/Plan:   Continue 3 carb diet, change ONS to glucerna once daily, monitor intakes     Malnutrition Assessment:  Malnutrition Status:  No malnutrition (04/11/25 1521)      Nutrition Assessment:    MST 5. Pt presenting w/ empyema. Mild wt loss pta. Pt currently on 3 carb diet w/ daily high vianey high protein ONS being sent. Elevated BG. Pt eating well. WIll change ONS to diabetic for better glucose control.    Nutrition Related Findings:    157 glucose Wound Type: None       Current Nutrition Intake & Therapies:    Average Meal Intake: 51-75%  Average Supplements Intake: Unable to assess  ADULT ORAL NUTRITION SUPPLEMENT; Breakfast; Standard High Calorie/High Protein Oral Supplement  ADULT DIET; Regular; 3 carb choices (45 gm/meal)    Anthropometric Measures:  Height: 152.4 cm (5')  Ideal Body Weight (IBW): 100 lbs (45 kg)       Current Body Weight: 106.1 kg (233 lb 14.5 oz),   IBW.    Current BMI (kg/m2): 45.7                                  Estimated Daily Nutrient Needs:  Energy Requirements Based On: Kcal/kg  Weight Used for Energy Requirements: Current  Energy (kcal/day): 1275 - 1590 (12 - 15 kcal/kg)  Weight Used for Protein Requirements: Ideal  Protein (g/day): 68 - 91 (1.5 - 2 g/kg IBW)  Method Used for Fluid Requirements: 1 ml/kcal  Fluid (ml/day): or per provider    Nutrition Diagnosis:   Unintended weight loss related to inadequate protein-energy intake as evidenced by weight loss    Nutrition Interventions:   Food and/or Nutrient Delivery: Continue Current Diet, Modify Oral Nutrition Supplement  Nutrition Education/Counseling: No recommendation at this time  Coordination of Nutrition Care: No recommendation at this time       Goals:  Goals: Meet at least 75% of estimated needs, by next RD assessment  Type of Goal: New goal  Previous Goal Met: New Goal    Nutrition Monitoring and

## 2025-04-11 NOTE — PLAN OF CARE
Problem: Chronic Conditions and Co-morbidities  Goal: Patient's chronic conditions and co-morbidity symptoms are monitored and maintained or improved  Outcome: Progressing     Problem: Discharge Planning  Goal: Discharge to home or other facility with appropriate resources  Outcome: Progressing     Problem: Pain  Goal: Verbalizes/displays adequate comfort level or baseline comfort level  Outcome: Progressing     Problem: Respiratory - Adult  Goal: Achieves optimal ventilation and oxygenation  Outcome: Progressing     Problem: Skin/Tissue Integrity - Adult  Goal: Skin integrity remains intact  Outcome: Progressing     Problem: Infection - Adult  Goal: Absence of infection at discharge  Outcome: Progressing     Problem: Nutrition Deficit:  Goal: Optimize nutritional status  Outcome: Progressing      Can you please do this once the office note is completed

## 2025-04-11 NOTE — PROGRESS NOTES
enzymes:No results for input(s): \"CKTOTAL\", \"CKMB\", \"CKMBINDEX\", \"TROPONINI\" in the last 72 hours.  BNP:No results for input(s): \"BNP\" in the last 72 hours.  Lipid profile: No results for input(s): \"CHOL\", \"TRIG\", \"HDL\", \"LDL\" in the last 72 hours.    Invalid input(s): \"LDLCALC\"  Blood Gases: No results found for: \"PH\", \"PCO2\", \"PO2\", \"HCO3\", \"O2SAT\"  Thyroid functions: No results found for: \"T4\", \"TSH\"       Radiology Review:  Pertinent images / reports were reviewed as a part of this visit.    CT Chest w/ contrast: No results found for this or any previous visit.      CT Chest w/o contrast: No results found for this or any previous visit.      CTPA: Results for orders placed during the hospital encounter of 04/09/25    CT CHEST PULMONARY EMBOLISM W CONTRAST    Narrative  EXAMINATION:  CTA OF THE CHEST 4/9/2025 1:24 pm    TECHNIQUE:  CTA of the chest was performed after the administration of intravenous  contrast.  Multiplanar reformatted images are provided for review.  MIP  images are provided for review. Automated exposure control, iterative  reconstruction, and/or weight based adjustment of the mA/kV was utilized to  reduce the radiation dose to as low as reasonably achievable.    COMPARISON:  None.    HISTORY:  ORDERING SYSTEM PROVIDED HISTORY: Chest pain, tachycardia, pleuritic chest  pain, recent severe pneumonia with pleural effusion  TECHNOLOGIST PROVIDED HISTORY:  Reason for exam:->Chest pain, tachycardia, pleuritic chest pain, recent  severe pneumonia with pleural effusion  Additional Contrast?->1  Reason for Exam: Chest pain, tachycardia, pleuritic chest pain, recent severe  pneumonia with pleural effusion    FINDINGS:  Pulmonary Arteries: Pulmonary arteries are adequately opacified for  evaluation.  No evidence of intraluminal filling defect to suggest pulmonary  embolism.  Main pulmonary artery is normal in caliber.    Lines and tubes: None    Mediastinum and Hilum: No enlarged lymph nodes    Heart  effusion    FINDINGS:  There is a no change in the moderate 2 large left pleural effusion.  There is  some platelike atelectasis in the left mid lung zone.  Right lung is  unremarkable.  Visualized borders of the heart and mediastinum are  unremarkable.    Impression  No change in the moderate to large left pleural effusion.  Underlying  pneumonia cannot be excluded.  This would require CT.                     Husam Sutherland MD, M.D.            4/11/2025, 10:51 AM

## 2025-04-11 NOTE — PROGRESS NOTES
Physician Progress Note      PATIENT:               NICHOLAS COATES  CSN #:                  [id number]  :                       1980  ADMIT DATE:       2025 12:24 PM  DISCH DATE:  RESPONDING  PROVIDER #:        Colin BOYER MD          QUERY TEXT:    Internal Medicine,    Noted documentation of  possible sepsis by the ED physician.  Please clarify   the following:    Indicators:  -Risk-empyema, recent strep PNA  -ED physician documents: \"Will give IV fluids at this time for possible sepsis   with sodium chloride.\"  -SIRS criteria: WBC 15, HR over 90 up to 112, temp 100.6,  , sed rate   86  -pleural fluid 3+ Gram positive cocci ?in chains - resembling Strep  Treatment: labs, imaging, Pulmonology consult, V Zyvox and Zosyn, chest tube   w/ TPA instillation, medical management    Thank you    The clinical indicators include:  Options provided:  -- Sepsis due to strep PNA with empyema confirmed present on admission  -- Sepsis due to empyema without  PNA  confirmed present on admission  -- Strep PNA with empyema, sepsis was ruled out  -- Empyema without PNA,  sepsis was ruled out  -- Other - I will add my own diagnosis  -- Disagree - Not applicable / Not valid  -- Disagree - Clinically unable to determine / Unknown  -- Refer to Clinical Documentation Reviewer    PROVIDER RESPONSE TEXT:    The diagnosis of sepsis due to strep PNA with empyema was confirmed to be   present on admission.    Query created by: Suyapa Bowden on 2025 12:15 PM      Electronically signed by:  Colin BOYER MD 2025 1:21 PM

## 2025-04-11 NOTE — PROGRESS NOTES
V2.0    [facility] [place] Note      Name:  Stacie Donato /Age/Sex: 1980  (44 y.o. female)   MRN & CSN:  [id number] & [id number] Encounter Date/Time: 2025 12:05 PM EDT   Location:  [id number] PCP: Samuel Sy MD     Attending:Colin Seay MD       Hospital Day: 3    Assessment and Recommendations   Stacie Donato is a 44 y.o. female with pmh of asthma, diabetes mellitus, obesity who presents with Empyema (HCC)      Patient was examined this morning.  Presented here with left-sided chest pain.  Patient was recently admitted with left-sided empyema.  Patient was discharged home on antibiotics, returned when her symptoms return.  Left-sided chest pain, fevers, chills  CT chest was obtained which showing continuation of left-sided empyema    Pulmonology was consulted recommended CT-guided chest tube, which has been placed  Acapella, incentive parameter    Plan:   Left-sided empyema   Zosyn 3.375 mg every 8 hours  Zyvox 600 mg twice daily  Prednisone 20 mg daily  Chest tube placed 4/10/2025  Pulmonology consult is appreciated    2.  Diabetes mellitus  Hemoglobin A1c was last obtained on 3/8/2025 to be 10.3  Lantus 30 units daily  Humalog 11 units 3 times a day with meal  Insulin sliding scale    3.  Hypomagnesemia  Present with magnesium at 1.5.  Will replace with magnesium protocol  Will monitor with routine labs    Will continue to follow, monitor and manage chronic medical condition with medication listed below    Diet ADULT DIET; Regular; 3 carb choices (45 gm/meal)  ADULT ORAL NUTRITION SUPPLEMENT; Breakfast; Diabetic Oral Supplement   DVT Prophylaxis [x] Lovenox, []  Heparin, [] SCDs, [x] Ambulation,  [] Eliquis, [] Xarelto  [] Coumadin   Code Status Full Code   Disposition From: Home  Expected Disposition: Home  Estimated Date of Discharge: 1-2 days  Patient requires continued admission due to clinical improvement and further evaluation   Surrogate Decision Maker/ POA Self

## 2025-04-11 NOTE — PROGRESS NOTES
Per Pulmonologist pierre GARENTT to discard intrapleural medication alteplase and pulmozyme, MD will reorder med tomorrow. Order received by phone. Order completed

## 2025-04-11 NOTE — PROGRESS NOTES
Patient accidently knock off the atrium of chest tube while moving in her room. Chest tube inspected , no leak noted, no kinks noted, new atrium placed , Charge nurse aware, Md Aware

## 2025-04-12 ENCOUNTER — APPOINTMENT (OUTPATIENT)
Dept: GENERAL RADIOLOGY | Age: 45
End: 2025-04-12
Payer: COMMERCIAL

## 2025-04-12 LAB
ANION GAP SERPL CALCULATED.3IONS-SCNC: 10 MMOL/L (ref 3–16)
BASOPHILS # BLD: 0.1 K/UL (ref 0–0.2)
BASOPHILS NFR BLD: 1.4 %
BUN SERPL-MCNC: 7 MG/DL (ref 7–20)
CALCIUM SERPL-MCNC: 8.5 MG/DL (ref 8.3–10.6)
CHLORIDE SERPL-SCNC: 98 MMOL/L (ref 99–110)
CO2 SERPL-SCNC: 28 MMOL/L (ref 21–32)
CREAT SERPL-MCNC: 0.5 MG/DL (ref 0.6–1.1)
DEPRECATED RDW RBC AUTO: 13.2 % (ref 12.4–15.4)
EOSINOPHIL # BLD: 0.1 K/UL (ref 0–0.6)
EOSINOPHIL NFR BLD: 0.7 %
GFR SERPLBLD CREATININE-BSD FMLA CKD-EPI: >90 ML/MIN/{1.73_M2}
GLUCOSE BLD-MCNC: 153 MG/DL (ref 70–99)
GLUCOSE BLD-MCNC: 211 MG/DL (ref 70–99)
GLUCOSE BLD-MCNC: 250 MG/DL (ref 70–99)
GLUCOSE BLD-MCNC: 250 MG/DL (ref 70–99)
GLUCOSE SERPL-MCNC: 194 MG/DL (ref 70–99)
HCT VFR BLD AUTO: 31.4 % (ref 36–48)
HGB BLD-MCNC: 10.7 G/DL (ref 12–16)
LYMPHOCYTES # BLD: 1.7 K/UL (ref 1–5.1)
LYMPHOCYTES NFR BLD: 21 %
MCH RBC QN AUTO: 31.9 PG (ref 26–34)
MCHC RBC AUTO-ENTMCNC: 34.2 G/DL (ref 31–36)
MCV RBC AUTO: 93.3 FL (ref 80–100)
MONOCYTES # BLD: 0.4 K/UL (ref 0–1.3)
MONOCYTES NFR BLD: 4.8 %
NEUTROPHILS # BLD: 6 K/UL (ref 1.7–7.7)
NEUTROPHILS NFR BLD: 72.1 %
PERFORMED ON: ABNORMAL
PLATELET # BLD AUTO: 395 K/UL (ref 135–450)
PMV BLD AUTO: 7.9 FL (ref 5–10.5)
POTASSIUM SERPL-SCNC: 3.6 MMOL/L (ref 3.5–5.1)
RBC # BLD AUTO: 3.37 M/UL (ref 4–5.2)
SODIUM SERPL-SCNC: 136 MMOL/L (ref 136–145)
WBC # BLD AUTO: 8.3 K/UL (ref 4–11)

## 2025-04-12 PROCEDURE — 6360000002 HC RX W HCPCS

## 2025-04-12 PROCEDURE — 94760 N-INVAS EAR/PLS OXIMETRY 1: CPT

## 2025-04-12 PROCEDURE — 85025 COMPLETE CBC W/AUTO DIFF WBC: CPT

## 2025-04-12 PROCEDURE — 6360000002 HC RX W HCPCS: Performed by: INTERNAL MEDICINE

## 2025-04-12 PROCEDURE — 2500000003 HC RX 250 WO HCPCS

## 2025-04-12 PROCEDURE — 3E0L317 INTRODUCTION OF OTHER THROMBOLYTIC INTO PLEURAL CAVITY, PERCUTANEOUS APPROACH: ICD-10-PCS | Performed by: INTERNAL MEDICINE

## 2025-04-12 PROCEDURE — 71045 X-RAY EXAM CHEST 1 VIEW: CPT

## 2025-04-12 PROCEDURE — 2580000003 HC RX 258: Performed by: INTERNAL MEDICINE

## 2025-04-12 PROCEDURE — 80048 BASIC METABOLIC PNL TOTAL CA: CPT

## 2025-04-12 PROCEDURE — 2580000003 HC RX 258

## 2025-04-12 PROCEDURE — 1200000000 HC SEMI PRIVATE

## 2025-04-12 PROCEDURE — 99233 SBSQ HOSP IP/OBS HIGH 50: CPT | Performed by: INTERNAL MEDICINE

## 2025-04-12 PROCEDURE — 2500000003 HC RX 250 WO HCPCS: Performed by: INTERNAL MEDICINE

## 2025-04-12 PROCEDURE — 6370000000 HC RX 637 (ALT 250 FOR IP): Performed by: INTERNAL MEDICINE

## 2025-04-12 PROCEDURE — 6370000000 HC RX 637 (ALT 250 FOR IP)

## 2025-04-12 PROCEDURE — 32562 LYSE CHEST FIBRIN SUBQ DAY: CPT | Performed by: INTERNAL MEDICINE

## 2025-04-12 PROCEDURE — 6370000000 HC RX 637 (ALT 250 FOR IP): Performed by: NURSE PRACTITIONER

## 2025-04-12 RX ADMIN — PIPERACILLIN AND TAZOBACTAM 4500 MG: 4; .5 INJECTION, POWDER, LYOPHILIZED, FOR SOLUTION INTRAVENOUS at 14:41

## 2025-04-12 RX ADMIN — DORNASE ALFA 5 MG: 1 SOLUTION RESPIRATORY (INHALATION) at 13:30

## 2025-04-12 RX ADMIN — INSULIN LISPRO 11 UNITS: 100 INJECTION, SOLUTION INTRAVENOUS; SUBCUTANEOUS at 17:43

## 2025-04-12 RX ADMIN — PIPERACILLIN AND TAZOBACTAM 4500 MG: 4; .5 INJECTION, POWDER, LYOPHILIZED, FOR SOLUTION INTRAVENOUS at 22:01

## 2025-04-12 RX ADMIN — INSULIN LISPRO 11 UNITS: 100 INJECTION, SOLUTION INTRAVENOUS; SUBCUTANEOUS at 12:38

## 2025-04-12 RX ADMIN — OXYCODONE AND ACETAMINOPHEN 1 TABLET: 325; 5 TABLET ORAL at 07:44

## 2025-04-12 RX ADMIN — ENOXAPARIN SODIUM 30 MG: 100 INJECTION SUBCUTANEOUS at 20:52

## 2025-04-12 RX ADMIN — LINEZOLID 600 MG: 600 INJECTION, SOLUTION INTRAVENOUS at 04:46

## 2025-04-12 RX ADMIN — LINEZOLID 600 MG: 600 INJECTION, SOLUTION INTRAVENOUS at 15:59

## 2025-04-12 RX ADMIN — SODIUM CHLORIDE, PRESERVATIVE FREE 10 ML: 5 INJECTION INTRAVENOUS at 20:52

## 2025-04-12 RX ADMIN — PREDNISONE 40 MG: 20 TABLET ORAL at 07:45

## 2025-04-12 RX ADMIN — FAMOTIDINE 10 MG: 20 TABLET, FILM COATED ORAL at 16:13

## 2025-04-12 RX ADMIN — OXYCODONE AND ACETAMINOPHEN 1 TABLET: 325; 5 TABLET ORAL at 15:56

## 2025-04-12 RX ADMIN — PIPERACILLIN AND TAZOBACTAM 4500 MG: 4; .5 INJECTION, POWDER, LYOPHILIZED, FOR SOLUTION INTRAVENOUS at 05:43

## 2025-04-12 RX ADMIN — INSULIN GLARGINE 30 UNITS: 100 INJECTION, SOLUTION SUBCUTANEOUS at 20:50

## 2025-04-12 RX ADMIN — ENOXAPARIN SODIUM 30 MG: 100 INJECTION SUBCUTANEOUS at 07:45

## 2025-04-12 RX ADMIN — OXYCODONE AND ACETAMINOPHEN 1 TABLET: 325; 5 TABLET ORAL at 22:02

## 2025-04-12 RX ADMIN — Medication 6 MG: at 22:02

## 2025-04-12 RX ADMIN — SODIUM CHLORIDE 10 MG: 9 INJECTION INTRAMUSCULAR; INTRAVENOUS; SUBCUTANEOUS at 13:29

## 2025-04-12 RX ADMIN — INSULIN LISPRO 11 UNITS: 100 INJECTION, SOLUTION INTRAVENOUS; SUBCUTANEOUS at 08:58

## 2025-04-12 ASSESSMENT — PAIN SCALES - GENERAL
PAINLEVEL_OUTOF10: 6
PAINLEVEL_OUTOF10: 9
PAINLEVEL_OUTOF10: 8

## 2025-04-12 ASSESSMENT — PAIN DESCRIPTION - DESCRIPTORS
DESCRIPTORS: ACHING;SHARP
DESCRIPTORS: ACHING

## 2025-04-12 ASSESSMENT — PAIN DESCRIPTION - LOCATION
LOCATION: RIB CAGE
LOCATION: FLANK

## 2025-04-12 ASSESSMENT — PAIN SCALES - WONG BAKER
WONGBAKER_NUMERICALRESPONSE: NO HURT
WONGBAKER_NUMERICALRESPONSE: NO HURT

## 2025-04-12 ASSESSMENT — PAIN DESCRIPTION - ORIENTATION
ORIENTATION: LEFT
ORIENTATION: LEFT

## 2025-04-12 ASSESSMENT — PAIN - FUNCTIONAL ASSESSMENT: PAIN_FUNCTIONAL_ASSESSMENT: ACTIVITIES ARE NOT PREVENTED

## 2025-04-12 NOTE — PROGRESS NOTES
Hospitalist   Progress Note    Patient Name: Stacie Donato  PCP: Samuel Sy MD  Date of Admission: 4/9/2025    Chief Complaint on Admission: Shortness of breath. Pt to ed c/o sob and chest pain that started yesterday.   Chief diagnosis after evaluation: Empyema.  Recurrent loculated left pleural effusion    Brief Synopsis: Patient is a 44 y.o. woman who has a past medical history of Asthma, Fatty liver, Obesity, Psoriasis, Psoriasis, and Type 2 diabetes mellitus. who was admitted on 4/9/2025 for evaluation and treatment of an Empyema. She has had a recurrent loculated left pleural effusion    Pt Seen/Examined and Chart Reviewed.     Subjective: Pt is feeling better and has no new complaints. She reports continued pain at her chest tube site    Objective:  Allergies  Patient has no known allergies.    Medications    Scheduled Meds:   predniSONE  40 mg Oral Daily    piperacillin-tazobactam  4,500 mg IntraVENous Q8H    sodium chloride flush  5-40 mL IntraVENous 2 times per day    enoxaparin  30 mg SubCUTAneous BID    linezolid  600 mg IntraVENous Q12H    piperacillin-tazobactam  4,500 mg IntraVENous Once    insulin glargine  30 Units SubCUTAneous Nightly    insulin lispro  11 Units SubCUTAneous TID WC    lidocaine 1 % injection  50 mg IntraDERmal Once     Infusions:   sodium chloride      dextrose       PRN Meds:  sodium chloride flush, sodium chloride, potassium chloride **OR** potassium alternative oral replacement **OR** potassium chloride, magnesium sulfate, ondansetron **OR** ondansetron, polyethylene glycol, acetaminophen **OR** acetaminophen, famotidine, glucose, dextrose bolus **OR** dextrose bolus, glucagon (rDNA), dextrose, oxyCODONE-acetaminophen, melatonin    Physical    VITALS:  BP (!) 138/93   Pulse 73   Temp 97.3 °F (36.3 °C) (Oral)   Resp 18   Ht 1.524 m (5')   Wt 106.9 kg (235 lb 10.8 oz)   SpO2 99%   BMI 46.03 kg/m²   CONSTITUTIONAL:  Morbidly Obese 44 y.o. year-old female who is

## 2025-04-12 NOTE — PROCEDURES
PROCEDURE NOTE  Date: 4/12/2025   Name: Stacie Donato  YOB: 1980    TPA/Dornase Instillation Note    Procedure for TPA/Dornase instillation explained to patient. Chest tube clamped.  Luer lock prepped with alcohol. TPA/Dornase infused then flushed with saline.  unclamp chest tube in one hour. Patient tolerated procedure well.    Osmin Tracy MD  [facility]

## 2025-04-12 NOTE — PLAN OF CARE
Problem: Chronic Conditions and Co-morbidities  Goal: Patient's chronic conditions and co-morbidity symptoms are monitored and maintained or improved  4/12/2025 1017 by Jania Arellano, RN  Outcome: Progressing  Flowsheets (Taken 4/12/2025 0800)  Care Plan - Patient's Chronic Conditions and Co-Morbidity Symptoms are Monitored and Maintained or Improved:   Monitor and assess patient's chronic conditions and comorbid symptoms for stability, deterioration, or improvement   Collaborate with multidisciplinary team to address chronic and comorbid conditions and prevent exacerbation or deterioration   Update acute care plan with appropriate goals if chronic or comorbid symptoms are exacerbated and prevent overall improvement and discharge  4/12/2025 0037 by Ky Hagan, RN  Outcome: Progressing  Flowsheets (Taken 4/12/2025 0032)  Care Plan - Patient's Chronic Conditions and Co-Morbidity Symptoms are Monitored and Maintained or Improved:   Monitor and assess patient's chronic conditions and comorbid symptoms for stability, deterioration, or improvement   Collaborate with multidisciplinary team to address chronic and comorbid conditions and prevent exacerbation or deterioration     Problem: Discharge Planning  Goal: Discharge to home or other facility with appropriate resources  4/12/2025 1017 by Jania Arellano, RN  Outcome: Progressing  Flowsheets (Taken 4/12/2025 0800)  Discharge to home or other facility with appropriate resources:   Identify barriers to discharge with patient and caregiver   Arrange for needed discharge resources and transportation as appropriate   Identify discharge learning needs (meds, wound care, etc)  4/12/2025 0037 by Ky Hagan, RN  Outcome: Progressing  Flowsheets (Taken 4/12/2025 0032)  Discharge to home or other facility with appropriate resources:   Identify barriers to discharge with patient and caregiver   Arrange for needed discharge resources and transportation as

## 2025-04-12 NOTE — PLAN OF CARE
Problem: Chronic Conditions and Co-morbidities  Goal: Patient's chronic conditions and co-morbidity symptoms are monitored and maintained or improved  4/12/2025 0037 by Ky Hagan RN  Outcome: Progressing  Flowsheets (Taken 4/12/2025 0032)  Care Plan - Patient's Chronic Conditions and Co-Morbidity Symptoms are Monitored and Maintained or Improved:   Monitor and assess patient's chronic conditions and comorbid symptoms for stability, deterioration, or improvement   Collaborate with multidisciplinary team to address chronic and comorbid conditions and prevent exacerbation or deterioration  4/11/2025 1731 by Stacie Hidalgo RN  Outcome: Progressing     Problem: Discharge Planning  Goal: Discharge to home or other facility with appropriate resources  4/12/2025 0037 by Ky Hagan RN  Outcome: Progressing  Flowsheets (Taken 4/12/2025 0032)  Discharge to home or other facility with appropriate resources:   Identify barriers to discharge with patient and caregiver   Arrange for needed discharge resources and transportation as appropriate   Identify discharge learning needs (meds, wound care, etc)  4/11/2025 1731 by Stacie Hidalgo RN  Outcome: Progressing     Problem: Pain  Goal: Verbalizes/displays adequate comfort level or baseline comfort level  4/12/2025 0037 by Ky Hagan RN  Outcome: Progressing  4/11/2025 1731 by Stacie Hidalgo RN  Outcome: Progressing     Problem: Respiratory - Adult  Goal: Achieves optimal ventilation and oxygenation  4/12/2025 0037 by Ky Hagan RN  Outcome: Progressing  Flowsheets (Taken 4/12/2025 0032)  Achieves optimal ventilation and oxygenation:   Assess for changes in respiratory status   Position to facilitate oxygenation and minimize respiratory effort   Assess for changes in mentation and behavior  4/11/2025 1731 by Stacie Hidalgo RN  Outcome: Progressing     Problem: Skin/Tissue Integrity - Adult  Goal: Skin integrity remains intact  4/12/2025 0037 by

## 2025-04-12 NOTE — PROGRESS NOTES
Pulmonary Progress Note    Date of Admission: 4/9/2025   LOS: 3 days     Chief Complaint   Patient presents with    Shortness of Breath     Pt to ed c/o sob and chest pain that started yesterday.        Assessment/Plan:       Empyema, left  -Status post chest tube  -Chest x-ray improving, minimal output  -Will trial tPA/DNase today.  Clamp for 1 hour then unclamp to drainage  -Continue antibiotics, strep epidermidis and culture  -Prednisone for pleurisy      24 Hour Events/Subjective  No acute events overnight.  Staph epidermidis from pleural fluid.  Chest x-ray improving    ROS:   No nausea  No Vomiting  No chest pain      Intake/Output Summary (Last 24 hours) at 4/12/2025 1021  Last data filed at 4/11/2025 1250  Gross per 24 hour   Intake 460 ml   Output --   Net 460 ml         PHYSICAL EXAM:   Blood pressure (!) 138/93, pulse 73, temperature 97.3 °F (36.3 °C), temperature source Oral, resp. rate 18, height 1.524 m (5'), weight 106.9 kg (235 lb 10.8 oz), SpO2 99%, not currently breastfeeding.'  Gen:  No acute distress.   Resp:  No crackles. No wheezes. No rhonchi. No dullness on percussion.  CV: Regular rate. Regular rhythm. No murmur or rub. No edema.   Neuro:  no focal neurologic deficit.  Moves all extremities  Psych: Awake and alert  Mood stable.      Labs reviewed:  CBC:   Recent Labs     04/10/25  0552 04/11/25  0612 04/12/25  0520   WBC 11.0 9.6 8.3   HGB 10.9* 10.3* 10.7*   HCT 32.0* 29.6* 31.4*   MCV 93.4 92.9 93.3    344 395     BMP:   Recent Labs     04/10/25  0552 04/11/25  0612 04/12/25  0520   * 140 136   K 3.5 3.6 3.6   CL 95* 101 98*   CO2 26 29 28   BUN 8 8 7   CREATININE 0.5* 0.5* 0.5*     LIVER PROFILE:   Recent Labs     04/09/25  1245   AST 13*   ALT 9*   BILITOT 1.6*   ALKPHOS 99     PT/INR: No results for input(s): \"PROTIME\", \"INR\" in the last 72 hours.        Films:  Radiology Review:  Pertinent images / reports were reviewed as a part of this visit.            This note was

## 2025-04-13 ENCOUNTER — APPOINTMENT (OUTPATIENT)
Dept: GENERAL RADIOLOGY | Age: 45
End: 2025-04-13
Payer: COMMERCIAL

## 2025-04-13 LAB
ANION GAP SERPL CALCULATED.3IONS-SCNC: 9 MMOL/L (ref 3–16)
BACTERIA BLD CULT ORG #2: NORMAL
BACTERIA BLD CULT: NORMAL
BACTERIA FLD AEROBE CULT: ABNORMAL
BASOPHILS # BLD: 0 K/UL (ref 0–0.2)
BASOPHILS NFR BLD: 0.6 %
BUN SERPL-MCNC: 11 MG/DL (ref 7–20)
CALCIUM SERPL-MCNC: 8.5 MG/DL (ref 8.3–10.6)
CHLORIDE SERPL-SCNC: 102 MMOL/L (ref 99–110)
CO2 SERPL-SCNC: 27 MMOL/L (ref 21–32)
CREAT SERPL-MCNC: 0.5 MG/DL (ref 0.6–1.1)
DEPRECATED RDW RBC AUTO: 13.3 % (ref 12.4–15.4)
EOSINOPHIL # BLD: 0.1 K/UL (ref 0–0.6)
EOSINOPHIL NFR BLD: 0.8 %
GFR SERPLBLD CREATININE-BSD FMLA CKD-EPI: >90 ML/MIN/{1.73_M2}
GLUCOSE BLD-MCNC: 120 MG/DL (ref 70–99)
GLUCOSE BLD-MCNC: 274 MG/DL (ref 70–99)
GLUCOSE BLD-MCNC: 291 MG/DL (ref 70–99)
GLUCOSE BLD-MCNC: 299 MG/DL (ref 70–99)
GLUCOSE SERPL-MCNC: 141 MG/DL (ref 70–99)
GRAM STN SPEC: ABNORMAL
HCT VFR BLD AUTO: 30.4 % (ref 36–48)
HGB BLD-MCNC: 10.5 G/DL (ref 12–16)
LYMPHOCYTES # BLD: 2.5 K/UL (ref 1–5.1)
LYMPHOCYTES NFR BLD: 32.7 %
MAGNESIUM SERPL-MCNC: 1.8 MG/DL (ref 1.8–2.4)
MCH RBC QN AUTO: 32 PG (ref 26–34)
MCHC RBC AUTO-ENTMCNC: 34.5 G/DL (ref 31–36)
MCV RBC AUTO: 92.9 FL (ref 80–100)
MONOCYTES # BLD: 0.4 K/UL (ref 0–1.3)
MONOCYTES NFR BLD: 5.4 %
NEUTROPHILS # BLD: 4.6 K/UL (ref 1.7–7.7)
NEUTROPHILS NFR BLD: 60.5 %
ORGANISM: ABNORMAL
PERFORMED ON: ABNORMAL
PLATELET # BLD AUTO: 378 K/UL (ref 135–450)
PMV BLD AUTO: 7.2 FL (ref 5–10.5)
POTASSIUM SERPL-SCNC: 3.5 MMOL/L (ref 3.5–5.1)
RBC # BLD AUTO: 3.27 M/UL (ref 4–5.2)
SODIUM SERPL-SCNC: 138 MMOL/L (ref 136–145)
WBC # BLD AUTO: 7.6 K/UL (ref 4–11)

## 2025-04-13 PROCEDURE — 94760 N-INVAS EAR/PLS OXIMETRY 1: CPT

## 2025-04-13 PROCEDURE — 6370000000 HC RX 637 (ALT 250 FOR IP)

## 2025-04-13 PROCEDURE — 99232 SBSQ HOSP IP/OBS MODERATE 35: CPT | Performed by: INTERNAL MEDICINE

## 2025-04-13 PROCEDURE — 2500000003 HC RX 250 WO HCPCS

## 2025-04-13 PROCEDURE — 85025 COMPLETE CBC W/AUTO DIFF WBC: CPT

## 2025-04-13 PROCEDURE — 6360000002 HC RX W HCPCS

## 2025-04-13 PROCEDURE — 83735 ASSAY OF MAGNESIUM: CPT

## 2025-04-13 PROCEDURE — 71045 X-RAY EXAM CHEST 1 VIEW: CPT

## 2025-04-13 PROCEDURE — 1200000000 HC SEMI PRIVATE

## 2025-04-13 PROCEDURE — 80048 BASIC METABOLIC PNL TOTAL CA: CPT

## 2025-04-13 PROCEDURE — 2580000003 HC RX 258

## 2025-04-13 PROCEDURE — 6370000000 HC RX 637 (ALT 250 FOR IP): Performed by: NURSE PRACTITIONER

## 2025-04-13 PROCEDURE — 6370000000 HC RX 637 (ALT 250 FOR IP): Performed by: INTERNAL MEDICINE

## 2025-04-13 RX ADMIN — INSULIN LISPRO 11 UNITS: 100 INJECTION, SOLUTION INTRAVENOUS; SUBCUTANEOUS at 18:19

## 2025-04-13 RX ADMIN — OXYCODONE AND ACETAMINOPHEN 1 TABLET: 325; 5 TABLET ORAL at 04:13

## 2025-04-13 RX ADMIN — PIPERACILLIN AND TAZOBACTAM 4500 MG: 4; .5 INJECTION, POWDER, LYOPHILIZED, FOR SOLUTION INTRAVENOUS at 21:54

## 2025-04-13 RX ADMIN — LINEZOLID 600 MG: 600 INJECTION, SOLUTION INTRAVENOUS at 04:10

## 2025-04-13 RX ADMIN — Medication 6 MG: at 21:55

## 2025-04-13 RX ADMIN — PIPERACILLIN AND TAZOBACTAM 4500 MG: 4; .5 INJECTION, POWDER, LYOPHILIZED, FOR SOLUTION INTRAVENOUS at 14:22

## 2025-04-13 RX ADMIN — OXYCODONE AND ACETAMINOPHEN 1 TABLET: 325; 5 TABLET ORAL at 21:55

## 2025-04-13 RX ADMIN — LINEZOLID 600 MG: 600 INJECTION, SOLUTION INTRAVENOUS at 16:50

## 2025-04-13 RX ADMIN — PREDNISONE 40 MG: 20 TABLET ORAL at 09:01

## 2025-04-13 RX ADMIN — INSULIN LISPRO 11 UNITS: 100 INJECTION, SOLUTION INTRAVENOUS; SUBCUTANEOUS at 12:55

## 2025-04-13 RX ADMIN — INSULIN GLARGINE 30 UNITS: 100 INJECTION, SOLUTION SUBCUTANEOUS at 20:54

## 2025-04-13 RX ADMIN — ENOXAPARIN SODIUM 30 MG: 100 INJECTION SUBCUTANEOUS at 09:02

## 2025-04-13 RX ADMIN — PIPERACILLIN AND TAZOBACTAM 4500 MG: 4; .5 INJECTION, POWDER, LYOPHILIZED, FOR SOLUTION INTRAVENOUS at 06:08

## 2025-04-13 RX ADMIN — SODIUM CHLORIDE, PRESERVATIVE FREE 10 ML: 5 INJECTION INTRAVENOUS at 20:54

## 2025-04-13 RX ADMIN — OXYCODONE AND ACETAMINOPHEN 1 TABLET: 325; 5 TABLET ORAL at 14:30

## 2025-04-13 RX ADMIN — ENOXAPARIN SODIUM 30 MG: 100 INJECTION SUBCUTANEOUS at 20:54

## 2025-04-13 ASSESSMENT — PAIN SCALES - GENERAL
PAINLEVEL_OUTOF10: 8
PAINLEVEL_OUTOF10: 0
PAINLEVEL_OUTOF10: 6
PAINLEVEL_OUTOF10: 7
PAINLEVEL_OUTOF10: 5

## 2025-04-13 ASSESSMENT — PAIN DESCRIPTION - LOCATION
LOCATION: FLANK
LOCATION: RIB CAGE
LOCATION: FLANK

## 2025-04-13 ASSESSMENT — PAIN DESCRIPTION - DESCRIPTORS
DESCRIPTORS: ACHING;SHARP
DESCRIPTORS: ACHING
DESCRIPTORS: DISCOMFORT

## 2025-04-13 ASSESSMENT — PAIN SCALES - WONG BAKER
WONGBAKER_NUMERICALRESPONSE: HURTS A LITTLE BIT
WONGBAKER_NUMERICALRESPONSE: NO HURT

## 2025-04-13 ASSESSMENT — PAIN DESCRIPTION - ORIENTATION
ORIENTATION: LEFT

## 2025-04-13 NOTE — PROGRESS NOTES
Hospitalist   Progress Note    Patient Name: Stacie Donato  PCP: Samuel Sy MD  Date of Admission: 4/9/2025    Chief Complaint on Admission: Shortness of breath. Pt to ed c/o sob and chest pain that started yesterday.   Chief diagnosis after evaluation: Empyema.  Recurrent loculated left pleural effusion    Brief Synopsis: Patient is a 44 y.o. woman who has a past medical history of Asthma, Fatty liver, Obesity, Psoriasis, Psoriasis, and Type 2 diabetes mellitus. who was admitted on 4/9/2025 for evaluation and treatment of an Empyema. She has had a recurrent loculated left pleural effusion    Pt Seen/Examined and Chart Reviewed.     Subjective: Pt hashas no new complaints. She reports continued pain at her chest tube site    Objective:  Allergies  Patient has no known allergies.    Medications    Scheduled Meds:   piperacillin-tazobactam  4,500 mg IntraVENous Q8H    sodium chloride flush  5-40 mL IntraVENous 2 times per day    enoxaparin  30 mg SubCUTAneous BID    linezolid  600 mg IntraVENous Q12H    piperacillin-tazobactam  4,500 mg IntraVENous Once    insulin glargine  30 Units SubCUTAneous Nightly    insulin lispro  11 Units SubCUTAneous TID WC    lidocaine 1 % injection  50 mg IntraDERmal Once     Infusions:   sodium chloride      dextrose       PRN Meds:  sodium chloride flush, sodium chloride, potassium chloride **OR** potassium alternative oral replacement **OR** potassium chloride, magnesium sulfate, ondansetron **OR** ondansetron, polyethylene glycol, acetaminophen **OR** acetaminophen, famotidine, glucose, dextrose bolus **OR** dextrose bolus, glucagon (rDNA), dextrose, oxyCODONE-acetaminophen, melatonin    Physical    VITALS:  /85   Pulse 72   Temp 97.5 °F (36.4 °C) (Oral)   Resp 18   Ht 1.524 m (5')   Wt 108.5 kg (239 lb 3.2 oz)   SpO2 97%   BMI 46.72 kg/m²   CONSTITUTIONAL:  Morbidly Obese 44 y.o. year-old female who is awake, alert, cooperative, no apparent distress, and  04/09/2025 12:45 PM    AST 13 04/09/2025 12:45 PM    BILITOT 1.6 04/09/2025 12:45 PM    BILIDIR 0.0 03/05/2012 06:30 PM     Magnesium:    Lab Results   Component Value Date/Time    MG 1.80 04/13/2025 06:10 AM     Phosphorus:  No results found for: \"PHOS\"  PT/INR:  No results found for: \"PTINR\"  U/A:    Lab Results   Component Value Date/Time    LEUKOCYTESUR Negative 03/05/2025 12:19 PM    WBCUA 0-2 03/05/2025 12:19 PM    RBCUA None seen 03/05/2025 12:19 PM    UROBILINOGEN 1.0 03/05/2025 12:19 PM    BILIRUBINUR Negative 03/05/2025 12:19 PM    BLOODU Negative 03/05/2025 12:19 PM    GLUCOSEU >=1000 03/05/2025 12:19 PM    GLUCOSEU Negative 03/05/2012 06:50 PM    PROTEINU TRACE 03/05/2025 12:19 PM     ABG:  No results found for: \"PHART\", \"GHR9LVS\", \"K8IVHSRS\", \"AJE6BVZ\", \"BEART\", \"THGBART\", \"PO2ART\", \"GMP7QLK\"      No intake or output data in the 24 hours ending 04/13/25 1411    Consults:  IP CONSULT TO PULMONOLOGY  IP CONSULT TO HOSPITALIST  IP CONSULT TO VASCULAR ACCESS TEAM  IP CONSULT TO PULMONOLOGY      Principal Problem:    Empyema (HCC)  Resolved Problems:    * No resolved hospital problems. *      ASSESSMENT AND PLAN:      Left-sided empyema   - Chest tube placed 4/10/2025  - tPA DNase 4/12/2025 with increased pleural output   - Continue Zosyn and Zyvox   - Prednisone 20 mg daily  - Pulmonology consult is appreciated     Diabetes mellitus II - Lantus, SSI and carb control diet    Hypomagnesemia - monitor Magnesium levels and replace as necessary    Morbid obesity due to excess calories (Body mass index is 46.72 kg/m².) - Complicating assessment and treatment. Placing patient at risk for multiple co-morbidities as well as early death and contributing to the patient's presentation.         DVT Prophylaxis: Lovenox  Diet: ADULT DIET; Regular; 3 carb choices (45 gm/meal)  ADULT ORAL NUTRITION SUPPLEMENT; Breakfast; Diabetic Oral Supplement  Code Status: Full Code      PT/OT Eval Status: Not indicated    Anticipate

## 2025-04-13 NOTE — PLAN OF CARE
Problem: Chronic Conditions and Co-morbidities  Goal: Patient's chronic conditions and co-morbidity symptoms are monitored and maintained or improved  Outcome: Progressing     Problem: Discharge Planning  Goal: Discharge to home or other facility with appropriate resources  Outcome: Progressing     Problem: Pain  Goal: Verbalizes/displays adequate comfort level or baseline comfort level  Outcome: Progressing     Problem: Respiratory - Adult  Goal: Achieves optimal ventilation and oxygenation  Outcome: Progressing     Problem: Skin/Tissue Integrity - Adult  Goal: Skin integrity remains intact  Outcome: Progressing     Problem: Infection - Adult  Goal: Absence of infection at discharge  Outcome: Progressing     Problem: Nutrition Deficit:  Goal: Optimize nutritional status  Outcome: Progressing

## 2025-04-13 NOTE — PROGRESS NOTES
Pulmonary Progress Note    Date of Admission: 4/9/2025   LOS: 4 days     Chief Complaint   Patient presents with    Shortness of Breath     Pt to ed c/o sob and chest pain that started yesterday.        Assessment/Plan:       Empyema, left  -Status post chest tube  -Chest x-ray improving, minimal output  -tPA DNase yesterday with increased output but chest x-ray pending  -Continue antibiotics, strep epidermidisin culture  -Can discontinue prednisone      24 Hour Events/Subjective  No acute events overnight.  tPA DNase yesterday with increased pleural output.    ROS:   No nausea  No Vomiting  No chest pain    No intake or output data in the 24 hours ending 04/13/25 1019        PHYSICAL EXAM:   Blood pressure 136/85, pulse 72, temperature 97.5 °F (36.4 °C), temperature source Oral, resp. rate 18, height 1.524 m (5'), weight 108.5 kg (239 lb 3.2 oz), SpO2 97%, not currently breastfeeding.'  Gen:  No acute distress.   Resp:  No crackles. No wheezes. No rhonchi. No dullness on percussion.  CV: Regular rate. Regular rhythm. No murmur or rub. No edema.   Neuro:  no focal neurologic deficit.  Moves all extremities  Psych: Awake and alert  Mood stable.      Labs reviewed:  CBC:   Recent Labs     04/11/25  0612 04/12/25  0520 04/13/25  0610   WBC 9.6 8.3 7.6   HGB 10.3* 10.7* 10.5*   HCT 29.6* 31.4* 30.4*   MCV 92.9 93.3 92.9    395 378     BMP:   Recent Labs     04/11/25  0612 04/12/25  0520 04/13/25  0610    136 138   K 3.6 3.6 3.5    98* 102   CO2 29 28 27   BUN 8 7 11   CREATININE 0.5* 0.5* 0.5*     LIVER PROFILE:   No results for input(s): \"AST\", \"ALT\", \"LIPASE\", \"AMYLASE\", \"BILIDIR\", \"BILITOT\", \"ALKPHOS\" in the last 72 hours.    Invalid input(s): \"ALB\"    PT/INR: No results for input(s): \"PROTIME\", \"INR\" in the last 72 hours.        Films:  Radiology Review:  Pertinent images / reports were reviewed as a part of this visit.            This note was transcribed using Dragon Dictation software. Please

## 2025-04-13 NOTE — PLAN OF CARE
Problem: Chronic Conditions and Co-morbidities  Goal: Patient's chronic conditions and co-morbidity symptoms are monitored and maintained or improved  4/12/2025 2118 by Ky Hagan RN  Outcome: Progressing  Flowsheets (Taken 4/12/2025 2111)  Care Plan - Patient's Chronic Conditions and Co-Morbidity Symptoms are Monitored and Maintained or Improved:   Monitor and assess patient's chronic conditions and comorbid symptoms for stability, deterioration, or improvement   Collaborate with multidisciplinary team to address chronic and comorbid conditions and prevent exacerbation or deterioration  4/12/2025 1017 by Jania Arellano, RN  Outcome: Progressing  Flowsheets (Taken 4/12/2025 0800)  Care Plan - Patient's Chronic Conditions and Co-Morbidity Symptoms are Monitored and Maintained or Improved:   Monitor and assess patient's chronic conditions and comorbid symptoms for stability, deterioration, or improvement   Collaborate with multidisciplinary team to address chronic and comorbid conditions and prevent exacerbation or deterioration   Update acute care plan with appropriate goals if chronic or comorbid symptoms are exacerbated and prevent overall improvement and discharge     Problem: Discharge Planning  Goal: Discharge to home or other facility with appropriate resources  4/12/2025 2118 by Ky Hagan RN  Outcome: Progressing  Flowsheets (Taken 4/12/2025 2111)  Discharge to home or other facility with appropriate resources:   Identify barriers to discharge with patient and caregiver   Arrange for needed discharge resources and transportation as appropriate   Identify discharge learning needs (meds, wound care, etc)  4/12/2025 1017 by Jania Arellano, RN  Outcome: Progressing  Flowsheets (Taken 4/12/2025 0800)  Discharge to home or other facility with appropriate resources:   Identify barriers to discharge with patient and caregiver   Arrange for needed discharge resources and transportation as

## 2025-04-14 LAB
ANION GAP SERPL CALCULATED.3IONS-SCNC: 9 MMOL/L (ref 3–16)
BASOPHILS # BLD: 0.1 K/UL (ref 0–0.2)
BASOPHILS NFR BLD: 1.2 %
BUN SERPL-MCNC: 9 MG/DL (ref 7–20)
CALCIUM SERPL-MCNC: 9 MG/DL (ref 8.3–10.6)
CHLORIDE SERPL-SCNC: 101 MMOL/L (ref 99–110)
CO2 SERPL-SCNC: 27 MMOL/L (ref 21–32)
CREAT SERPL-MCNC: 0.5 MG/DL (ref 0.6–1.1)
DEPRECATED RDW RBC AUTO: 13.1 % (ref 12.4–15.4)
EOSINOPHIL # BLD: 0 K/UL (ref 0–0.6)
EOSINOPHIL NFR BLD: 0.6 %
FUNGUS SPEC CULT: NORMAL
GFR SERPLBLD CREATININE-BSD FMLA CKD-EPI: >90 ML/MIN/{1.73_M2}
GLUCOSE BLD-MCNC: 116 MG/DL (ref 70–99)
GLUCOSE BLD-MCNC: 128 MG/DL (ref 70–99)
GLUCOSE BLD-MCNC: 130 MG/DL (ref 70–99)
GLUCOSE BLD-MCNC: 165 MG/DL (ref 70–99)
GLUCOSE BLD-MCNC: 94 MG/DL (ref 70–99)
GLUCOSE SERPL-MCNC: 161 MG/DL (ref 70–99)
HCT VFR BLD AUTO: 30.8 % (ref 36–48)
HGB BLD-MCNC: 10.8 G/DL (ref 12–16)
LOEFFLER MB STN SPEC: NORMAL
LYMPHOCYTES # BLD: 2.7 K/UL (ref 1–5.1)
LYMPHOCYTES NFR BLD: 33.5 %
MCH RBC QN AUTO: 32.6 PG (ref 26–34)
MCHC RBC AUTO-ENTMCNC: 35.2 G/DL (ref 31–36)
MCV RBC AUTO: 92.5 FL (ref 80–100)
MONOCYTES # BLD: 0.4 K/UL (ref 0–1.3)
MONOCYTES NFR BLD: 4.8 %
NEUTROPHILS # BLD: 4.9 K/UL (ref 1.7–7.7)
NEUTROPHILS NFR BLD: 59.9 %
PERFORMED ON: ABNORMAL
PERFORMED ON: NORMAL
PLATELET # BLD AUTO: 390 K/UL (ref 135–450)
PMV BLD AUTO: 7 FL (ref 5–10.5)
POTASSIUM SERPL-SCNC: 3.6 MMOL/L (ref 3.5–5.1)
RBC # BLD AUTO: 3.33 M/UL (ref 4–5.2)
SODIUM SERPL-SCNC: 137 MMOL/L (ref 136–145)
WBC # BLD AUTO: 8.1 K/UL (ref 4–11)

## 2025-04-14 PROCEDURE — 6360000002 HC RX W HCPCS

## 2025-04-14 PROCEDURE — 3E0L317 INTRODUCTION OF OTHER THROMBOLYTIC INTO PLEURAL CAVITY, PERCUTANEOUS APPROACH: ICD-10-PCS | Performed by: INTERNAL MEDICINE

## 2025-04-14 PROCEDURE — 80048 BASIC METABOLIC PNL TOTAL CA: CPT

## 2025-04-14 PROCEDURE — 6370000000 HC RX 637 (ALT 250 FOR IP): Performed by: NURSE PRACTITIONER

## 2025-04-14 PROCEDURE — 2580000003 HC RX 258

## 2025-04-14 PROCEDURE — 1200000000 HC SEMI PRIVATE

## 2025-04-14 PROCEDURE — 6360000002 HC RX W HCPCS: Performed by: INTERNAL MEDICINE

## 2025-04-14 PROCEDURE — 2580000003 HC RX 258: Performed by: INTERNAL MEDICINE

## 2025-04-14 PROCEDURE — 32562 LYSE CHEST FIBRIN SUBQ DAY: CPT | Performed by: INTERNAL MEDICINE

## 2025-04-14 PROCEDURE — 85025 COMPLETE CBC W/AUTO DIFF WBC: CPT

## 2025-04-14 PROCEDURE — 6370000000 HC RX 637 (ALT 250 FOR IP)

## 2025-04-14 PROCEDURE — 99233 SBSQ HOSP IP/OBS HIGH 50: CPT | Performed by: INTERNAL MEDICINE

## 2025-04-14 PROCEDURE — 2500000003 HC RX 250 WO HCPCS: Performed by: INTERNAL MEDICINE

## 2025-04-14 PROCEDURE — 94760 N-INVAS EAR/PLS OXIMETRY 1: CPT

## 2025-04-14 RX ORDER — CALCIUM CARBONATE 500 MG/1
1000 TABLET, CHEWABLE ORAL 3 TIMES DAILY PRN
Status: DISCONTINUED | OUTPATIENT
Start: 2025-04-14 | End: 2025-04-18 | Stop reason: HOSPADM

## 2025-04-14 RX ADMIN — SODIUM CHLORIDE 10 MG: 9 INJECTION INTRAMUSCULAR; INTRAVENOUS; SUBCUTANEOUS at 14:00

## 2025-04-14 RX ADMIN — FAMOTIDINE 10 MG: 20 TABLET, FILM COATED ORAL at 11:11

## 2025-04-14 RX ADMIN — SODIUM CHLORIDE: 0.9 INJECTION, SOLUTION INTRAVENOUS at 14:04

## 2025-04-14 RX ADMIN — PIPERACILLIN AND TAZOBACTAM 4500 MG: 4; .5 INJECTION, POWDER, LYOPHILIZED, FOR SOLUTION INTRAVENOUS at 21:15

## 2025-04-14 RX ADMIN — INSULIN GLARGINE 30 UNITS: 100 INJECTION, SOLUTION SUBCUTANEOUS at 21:07

## 2025-04-14 RX ADMIN — ENOXAPARIN SODIUM 30 MG: 100 INJECTION SUBCUTANEOUS at 08:41

## 2025-04-14 RX ADMIN — PIPERACILLIN AND TAZOBACTAM 4500 MG: 4; .5 INJECTION, POWDER, LYOPHILIZED, FOR SOLUTION INTRAVENOUS at 14:06

## 2025-04-14 RX ADMIN — OXYCODONE AND ACETAMINOPHEN 1 TABLET: 325; 5 TABLET ORAL at 14:54

## 2025-04-14 RX ADMIN — INSULIN LISPRO 11 UNITS: 100 INJECTION, SOLUTION INTRAVENOUS; SUBCUTANEOUS at 08:26

## 2025-04-14 RX ADMIN — WATER 5 MG: 1 INJECTION INTRAMUSCULAR; INTRAVENOUS; SUBCUTANEOUS at 14:00

## 2025-04-14 RX ADMIN — LINEZOLID 600 MG: 600 INJECTION, SOLUTION INTRAVENOUS at 16:31

## 2025-04-14 RX ADMIN — Medication 6 MG: at 21:07

## 2025-04-14 RX ADMIN — OXYCODONE AND ACETAMINOPHEN 1 TABLET: 325; 5 TABLET ORAL at 06:10

## 2025-04-14 RX ADMIN — OXYCODONE AND ACETAMINOPHEN 1 TABLET: 325; 5 TABLET ORAL at 21:07

## 2025-04-14 RX ADMIN — LINEZOLID 600 MG: 600 INJECTION, SOLUTION INTRAVENOUS at 03:55

## 2025-04-14 RX ADMIN — INSULIN LISPRO 11 UNITS: 100 INJECTION, SOLUTION INTRAVENOUS; SUBCUTANEOUS at 12:51

## 2025-04-14 RX ADMIN — PIPERACILLIN AND TAZOBACTAM 4500 MG: 4; .5 INJECTION, POWDER, LYOPHILIZED, FOR SOLUTION INTRAVENOUS at 05:59

## 2025-04-14 ASSESSMENT — PAIN SCALES - WONG BAKER
WONGBAKER_NUMERICALRESPONSE: NO HURT
WONGBAKER_NUMERICALRESPONSE: HURTS EVEN MORE
WONGBAKER_NUMERICALRESPONSE: NO HURT

## 2025-04-14 ASSESSMENT — PAIN SCALES - GENERAL
PAINLEVEL_OUTOF10: 6
PAINLEVEL_OUTOF10: 0
PAINLEVEL_OUTOF10: 6
PAINLEVEL_OUTOF10: 0
PAINLEVEL_OUTOF10: 6
PAINLEVEL_OUTOF10: 6
PAINLEVEL_OUTOF10: 9
PAINLEVEL_OUTOF10: 5
PAINLEVEL_OUTOF10: 8
PAINLEVEL_OUTOF10: 6

## 2025-04-14 ASSESSMENT — PAIN DESCRIPTION - ORIENTATION
ORIENTATION: LEFT

## 2025-04-14 ASSESSMENT — PAIN DESCRIPTION - DESCRIPTORS
DESCRIPTORS: ACHING;SHARP
DESCRIPTORS: ACHING
DESCRIPTORS: ACHING;SHARP
DESCRIPTORS: ACHING
DESCRIPTORS: ACHING
DESCRIPTORS: ACHING;SHARP

## 2025-04-14 ASSESSMENT — PAIN DESCRIPTION - LOCATION
LOCATION: CHEST
LOCATION: RIB CAGE

## 2025-04-14 ASSESSMENT — PAIN - FUNCTIONAL ASSESSMENT
PAIN_FUNCTIONAL_ASSESSMENT: ACTIVITIES ARE NOT PREVENTED

## 2025-04-14 ASSESSMENT — PAIN DESCRIPTION - PAIN TYPE
TYPE: ACUTE PAIN
TYPE: ACUTE PAIN

## 2025-04-14 ASSESSMENT — PAIN DESCRIPTION - ONSET: ONSET: ON-GOING

## 2025-04-14 ASSESSMENT — PAIN DESCRIPTION - FREQUENCY: FREQUENCY: CONTINUOUS

## 2025-04-14 NOTE — PROGRESS NOTES
Chest tube unclamped @ 1500 as ordered, Patient back on -20cm continuous suction. Patient up to chair in room. No distress noted. Family at bedside.

## 2025-04-14 NOTE — PROGRESS NOTES
Pulmonary Progress Note    Date of Admission: 4/9/2025   LOS: 5 days     Chief Complaint   Patient presents with    Shortness of Breath     Pt to ed c/o sob and chest pain that started yesterday.        Assessment/Plan:       Empyema, left  -Status post chest tube  - Not much in the way of output, chest x-ray stable  - Will need to repeat tPA DNase today but suspect patient is headed toward [facility]  -Continue antibiotics, strep epidermidisin culture      24 Hour Events/Subjective  No acute events overnight.  Minimal output.  Stable chest x-ray    ROS:   No nausea  No Vomiting  No chest pain    No intake or output data in the 24 hours ending 04/14/25 1032        PHYSICAL EXAM:   Blood pressure (!) 149/86, pulse 59, temperature 97.3 °F (36.3 °C), temperature source Oral, resp. rate 19, height 1.524 m (5'), weight 110.3 kg (243 lb 2.7 oz), SpO2 97%, not currently breastfeeding.'  Gen:  No acute distress.   Resp:  No crackles. No wheezes. No rhonchi. No dullness on percussion.  CV: Regular rate. Regular rhythm. No murmur or rub. No edema.   Neuro:  no focal neurologic deficit.  Moves all extremities  Psych: Awake and alert  Mood stable.      Labs reviewed:  CBC:   Recent Labs     04/12/25  0520 04/13/25  0610 04/14/25  0600   WBC 8.3 7.6 8.1   HGB 10.7* 10.5* 10.8*   HCT 31.4* 30.4* 30.8*   MCV 93.3 92.9 92.5    378 390     BMP:   Recent Labs     04/12/25  0520 04/13/25  0610 04/14/25  0600    138 137   K 3.6 3.5 3.6   CL 98* 102 101   CO2 28 27 27   BUN 7 11 9   CREATININE 0.5* 0.5* 0.5*     LIVER PROFILE:   No results for input(s): \"AST\", \"ALT\", \"LIPASE\", \"AMYLASE\", \"BILIDIR\", \"BILITOT\", \"ALKPHOS\" in the last 72 hours.    Invalid input(s): \"ALB\"    PT/INR: No results for input(s): \"PROTIME\", \"INR\" in the last 72 hours.        Films:  Radiology Review:  Pertinent images / reports were reviewed as a part of this visit.            This note was transcribed using Dragon Dictation software. Please disregard any  translational errors.    Thank you for this consult,    MD Nuris Dougherty Pulmonary, Critical Care, and Sleep Medicine

## 2025-04-14 NOTE — PROGRESS NOTES
4 Eyes Skin Assessment     NAME:  Stacie Donato  YOB: 1980  MEDICAL RECORD NUMBER:  [id number]    The patient is being assessed for  Transfer to New Unit    I agree that at least one RN has performed a thorough Head to Toe Skin Assessment on the patient. ALL assessment sites listed below have been assessed.      Areas assessed by both nurses:    Head, Face, Ears, Shoulders, Back, Chest, Arms, Elbows, Hands, Sacrum. Buttock, Coccyx, Ischium, Legs. Feet and Heels, and Under Medical Devices         Does the Patient have a Wound? No noted wound(s)  Chest tube site left back, Hard dry bilateral heels        David Prevention initiated by RN: No  Wound Care Orders initiated by RN: No    Pressure Injury (Stage 3,4, Unstageable, DTI, NWPT, and Complex wounds) if present, place Wound referral order by RN under : No    New Ostomies, if present place, Ostomy referral order under : No     Nurse 1 eSignature: Electronically signed by Stacie Gerard RN on 4/14/25 at 4:09 PM EDT    **SHARE this note so that the co-signing nurse can place an eSignature**    Nurse 2 eSignature: Electronically signed by Nisreen Griffith RN on 4/14/25 at 4:48 PM EDT

## 2025-04-14 NOTE — PLAN OF CARE
Problem: Chronic Conditions and Co-morbidities  Goal: Patient's chronic conditions and co-morbidity symptoms are monitored and maintained or improved  4/13/2025 2031 by Ky Hgaan RN  Outcome: Progressing  Flowsheets (Taken 4/13/2025 2028)  Care Plan - Patient's Chronic Conditions and Co-Morbidity Symptoms are Monitored and Maintained or Improved:   Monitor and assess patient's chronic conditions and comorbid symptoms for stability, deterioration, or improvement   Collaborate with multidisciplinary team to address chronic and comorbid conditions and prevent exacerbation or deterioration  4/13/2025 1209 by Jere Quiñonez RN  Outcome: Progressing     Problem: Discharge Planning  Goal: Discharge to home or other facility with appropriate resources  4/13/2025 2031 by Ky Hagan RN  Outcome: Progressing  Flowsheets (Taken 4/13/2025 2028)  Discharge to home or other facility with appropriate resources:   Identify barriers to discharge with patient and caregiver   Arrange for needed discharge resources and transportation as appropriate   Identify discharge learning needs (meds, wound care, etc)  4/13/2025 1209 by Jere Quiñonez RN  Outcome: Progressing     Problem: Pain  Goal: Verbalizes/displays adequate comfort level or baseline comfort level  4/13/2025 2031 by Ky Hagan RN  Outcome: Progressing  4/13/2025 1209 by Jere Quiñonez RN  Outcome: Progressing     Problem: Respiratory - Adult  Goal: Achieves optimal ventilation and oxygenation  4/13/2025 2031 by Ky Hagan RN  Outcome: Progressing  Flowsheets (Taken 4/13/2025 2028)  Achieves optimal ventilation and oxygenation:   Assess for changes in respiratory status   Assess for changes in mentation and behavior  4/13/2025 1209 by Jere Quiñonez RN  Outcome: Progressing     Problem: Skin/Tissue Integrity - Adult  Goal: Skin integrity remains intact  4/13/2025 2031 by Ky Hagan RN  Outcome: Progressing  Flowsheets (Taken

## 2025-04-14 NOTE — PROGRESS NOTES
Hospitalist   Progress Note    Patient Name: Stacie Donato  PCP: Samuel Sy MD  Date of Admission: 4/9/2025    Chief Complaint on Admission: Shortness of breath. Pt to ed c/o sob and chest pain that started yesterday.   Chief diagnosis after evaluation: Empyema.  Recurrent loculated left pleural effusion    Brief Synopsis: Patient is a 44 y.o. woman who has a past medical history of Asthma, Fatty liver, Obesity, Psoriasis, Psoriasis, and Type 2 diabetes mellitus. who was admitted on 4/9/2025 for evaluation and treatment of an Empyema. She has had a recurrent loculated left pleural effusion    Pt Seen/Examined and Chart Reviewed.     Subjective: Pt hashas no new complaints when seen this morning. She reports continued pain at her chest tube site    Objective:  Allergies  Patient has no known allergies.    Medications    Scheduled Meds:   ALTEplase (CATHFLO) 10 mg in sodium chloride (PF) 0.9 % 30 mL  10 mg IntraPLEUral Once    And    dornase alpha (PULMOZYME) 5 mg in sterile water 30 mL  5 mg IntraPLEUral Once    piperacillin-tazobactam  4,500 mg IntraVENous Q8H    sodium chloride flush  5-40 mL IntraVENous 2 times per day    enoxaparin  30 mg SubCUTAneous BID    linezolid  600 mg IntraVENous Q12H    piperacillin-tazobactam  4,500 mg IntraVENous Once    insulin glargine  30 Units SubCUTAneous Nightly    insulin lispro  11 Units SubCUTAneous TID WC    lidocaine 1 % injection  50 mg IntraDERmal Once     Infusions:   sodium chloride 25 mL/hr at 04/14/25 1404    dextrose       PRN Meds:  calcium carbonate, sodium chloride flush, sodium chloride, potassium chloride **OR** potassium alternative oral replacement **OR** potassium chloride, magnesium sulfate, ondansetron **OR** ondansetron, polyethylene glycol, acetaminophen **OR** acetaminophen, famotidine, glucose, dextrose bolus **OR** dextrose bolus, glucagon (rDNA), dextrose, oxyCODONE-acetaminophen, melatonin    Physical    VITALS:  BP (!) 154/97   Pulse 67

## 2025-04-14 NOTE — CARE COORDINATION
Continue to follow patient for discharge needs. Aware patient currently on IV antibiotics.     Electronically signed by SHOAIB Dobson, JUSTINW, Case Management on 4/14/2025 at 12:41 PM  [place] [phone]

## 2025-04-14 NOTE — PROGRESS NOTES
Patient transferred to room 4256 , Patient alert and oriented, Vitals recorded. Chest tube in place to left side/back to continuous suction as ordered. Dressing clean, dry and intact. Patient oriented to room and call light. Patient resting in bed call light with in reach. Bed wheels locked, in lowest position.

## 2025-04-14 NOTE — PROCEDURES
PROCEDURE NOTE  Date: 4/14/2025   Name: Stacie Donato  YOB: 1980    TPA/Dornase Instillation Note    Procedure for TPA/Dornase instillation explained to patient. Chest tube clamped.  Luer lock prepped with alcohol. TPA/Dornase infused then flushed with saline.  Order placed to unclamp chest tube in one hour and placed back to -20 cm continuous suction. Patient tolerated procedure well.    Osmin Tracy MD  [facility], [facility]

## 2025-04-15 ENCOUNTER — APPOINTMENT (OUTPATIENT)
Dept: CT IMAGING | Age: 45
End: 2025-04-15
Attending: INTERNAL MEDICINE
Payer: COMMERCIAL

## 2025-04-15 PROBLEM — Z96.89 CHEST TUBE IN PLACE: Status: ACTIVE | Noted: 2025-04-15

## 2025-04-15 PROBLEM — D72.828 NEUTROPHILIA: Status: ACTIVE | Noted: 2025-04-15

## 2025-04-15 PROBLEM — E83.42 HYPOMAGNESEMIA: Status: ACTIVE | Noted: 2025-04-15

## 2025-04-15 PROBLEM — J90 RECURRENT LEFT PLEURAL EFFUSION: Status: ACTIVE | Noted: 2025-04-15

## 2025-04-15 LAB
ACID FAST STN SPEC QL: NORMAL
ANION GAP SERPL CALCULATED.3IONS-SCNC: 10 MMOL/L (ref 3–16)
BASOPHILS # BLD: 0.2 K/UL (ref 0–0.2)
BASOPHILS NFR BLD: 2.3 %
BUN SERPL-MCNC: 9 MG/DL (ref 7–20)
CALCIUM SERPL-MCNC: 8.4 MG/DL (ref 8.3–10.6)
CHLORIDE SERPL-SCNC: 101 MMOL/L (ref 99–110)
CO2 SERPL-SCNC: 26 MMOL/L (ref 21–32)
CREAT SERPL-MCNC: 0.6 MG/DL (ref 0.6–1.1)
DEPRECATED RDW RBC AUTO: 13.5 % (ref 12.4–15.4)
EOSINOPHIL # BLD: 0.2 K/UL (ref 0–0.6)
EOSINOPHIL NFR BLD: 2.1 %
GFR SERPLBLD CREATININE-BSD FMLA CKD-EPI: >90 ML/MIN/{1.73_M2}
GLUCOSE BLD-MCNC: 114 MG/DL (ref 70–99)
GLUCOSE BLD-MCNC: 144 MG/DL (ref 70–99)
GLUCOSE BLD-MCNC: 144 MG/DL (ref 70–99)
GLUCOSE BLD-MCNC: 145 MG/DL (ref 70–99)
GLUCOSE SERPL-MCNC: 125 MG/DL (ref 70–99)
HCT VFR BLD AUTO: 33.3 % (ref 36–48)
HGB BLD-MCNC: 11.4 G/DL (ref 12–16)
LYMPHOCYTES # BLD: 2.4 K/UL (ref 1–5.1)
LYMPHOCYTES NFR BLD: 32.9 %
MCH RBC QN AUTO: 31.5 PG (ref 26–34)
MCHC RBC AUTO-ENTMCNC: 34.1 G/DL (ref 31–36)
MCV RBC AUTO: 92.2 FL (ref 80–100)
MONOCYTES # BLD: 0.4 K/UL (ref 0–1.3)
MONOCYTES NFR BLD: 4.8 %
MYCOBACTERIUM SPEC CULT: NORMAL
NEUTROPHILS # BLD: 4.3 K/UL (ref 1.7–7.7)
NEUTROPHILS NFR BLD: 57.9 %
PERFORMED ON: ABNORMAL
PLATELET # BLD AUTO: 384 K/UL (ref 135–450)
PMV BLD AUTO: 7.1 FL (ref 5–10.5)
POTASSIUM SERPL-SCNC: 3.7 MMOL/L (ref 3.5–5.1)
RBC # BLD AUTO: 3.61 M/UL (ref 4–5.2)
SODIUM SERPL-SCNC: 137 MMOL/L (ref 136–145)
WBC # BLD AUTO: 7.4 K/UL (ref 4–11)

## 2025-04-15 PROCEDURE — 6360000002 HC RX W HCPCS: Performed by: INTERNAL MEDICINE

## 2025-04-15 PROCEDURE — 99233 SBSQ HOSP IP/OBS HIGH 50: CPT | Performed by: INTERNAL MEDICINE

## 2025-04-15 PROCEDURE — 6360000002 HC RX W HCPCS

## 2025-04-15 PROCEDURE — 2580000003 HC RX 258

## 2025-04-15 PROCEDURE — 94760 N-INVAS EAR/PLS OXIMETRY 1: CPT

## 2025-04-15 PROCEDURE — 2580000003 HC RX 258: Performed by: INTERNAL MEDICINE

## 2025-04-15 PROCEDURE — 1200000000 HC SEMI PRIVATE

## 2025-04-15 PROCEDURE — 3E0L317 INTRODUCTION OF OTHER THROMBOLYTIC INTO PLEURAL CAVITY, PERCUTANEOUS APPROACH: ICD-10-PCS | Performed by: INTERNAL MEDICINE

## 2025-04-15 PROCEDURE — 2500000003 HC RX 250 WO HCPCS: Performed by: INTERNAL MEDICINE

## 2025-04-15 PROCEDURE — 32562 LYSE CHEST FIBRIN SUBQ DAY: CPT | Performed by: INTERNAL MEDICINE

## 2025-04-15 PROCEDURE — 80048 BASIC METABOLIC PNL TOTAL CA: CPT

## 2025-04-15 PROCEDURE — 85025 COMPLETE CBC W/AUTO DIFF WBC: CPT

## 2025-04-15 PROCEDURE — 6370000000 HC RX 637 (ALT 250 FOR IP)

## 2025-04-15 PROCEDURE — 99223 1ST HOSP IP/OBS HIGH 75: CPT | Performed by: INTERNAL MEDICINE

## 2025-04-15 PROCEDURE — 6370000000 HC RX 637 (ALT 250 FOR IP): Performed by: NURSE PRACTITIONER

## 2025-04-15 PROCEDURE — 71250 CT THORAX DX C-: CPT

## 2025-04-15 RX ADMIN — SODIUM CHLORIDE 10 MG: 9 INJECTION INTRAMUSCULAR; INTRAVENOUS; SUBCUTANEOUS at 12:40

## 2025-04-15 RX ADMIN — OXYCODONE AND ACETAMINOPHEN 1 TABLET: 325; 5 TABLET ORAL at 21:45

## 2025-04-15 RX ADMIN — LINEZOLID 600 MG: 600 INJECTION, SOLUTION INTRAVENOUS at 16:18

## 2025-04-15 RX ADMIN — PIPERACILLIN AND TAZOBACTAM 4500 MG: 4; .5 INJECTION, POWDER, LYOPHILIZED, FOR SOLUTION INTRAVENOUS at 14:38

## 2025-04-15 RX ADMIN — OXYCODONE AND ACETAMINOPHEN 1 TABLET: 325; 5 TABLET ORAL at 11:38

## 2025-04-15 RX ADMIN — ACETAMINOPHEN 650 MG: 325 TABLET ORAL at 16:23

## 2025-04-15 RX ADMIN — INSULIN GLARGINE 30 UNITS: 100 INJECTION, SOLUTION SUBCUTANEOUS at 21:45

## 2025-04-15 RX ADMIN — OXYCODONE AND ACETAMINOPHEN 1 TABLET: 325; 5 TABLET ORAL at 04:03

## 2025-04-15 RX ADMIN — PIPERACILLIN AND TAZOBACTAM 4500 MG: 4; .5 INJECTION, POWDER, LYOPHILIZED, FOR SOLUTION INTRAVENOUS at 21:52

## 2025-04-15 RX ADMIN — PIPERACILLIN AND TAZOBACTAM 4500 MG: 4; .5 INJECTION, POWDER, LYOPHILIZED, FOR SOLUTION INTRAVENOUS at 05:50

## 2025-04-15 RX ADMIN — ENOXAPARIN SODIUM 30 MG: 100 INJECTION SUBCUTANEOUS at 09:30

## 2025-04-15 RX ADMIN — WATER 5 MG: 1 INJECTION INTRAMUSCULAR; INTRAVENOUS; SUBCUTANEOUS at 12:40

## 2025-04-15 RX ADMIN — INSULIN LISPRO 11 UNITS: 100 INJECTION, SOLUTION INTRAVENOUS; SUBCUTANEOUS at 13:42

## 2025-04-15 RX ADMIN — ACETAMINOPHEN 650 MG: 325 TABLET ORAL at 04:03

## 2025-04-15 RX ADMIN — Medication 6 MG: at 22:00

## 2025-04-15 RX ADMIN — LINEZOLID 600 MG: 600 INJECTION, SOLUTION INTRAVENOUS at 03:55

## 2025-04-15 ASSESSMENT — PAIN DESCRIPTION - LOCATION
LOCATION: ABDOMEN;BACK
LOCATION: BACK

## 2025-04-15 ASSESSMENT — PAIN SCALES - GENERAL
PAINLEVEL_OUTOF10: 10
PAINLEVEL_OUTOF10: 7
PAINLEVEL_OUTOF10: 8

## 2025-04-15 ASSESSMENT — PAIN SCALES - WONG BAKER: WONGBAKER_NUMERICALRESPONSE: HURTS WORST

## 2025-04-15 ASSESSMENT — PAIN DESCRIPTION - ORIENTATION
ORIENTATION: LEFT
ORIENTATION: LEFT

## 2025-04-15 ASSESSMENT — PAIN DESCRIPTION - DESCRIPTORS
DESCRIPTORS: DISCOMFORT
DESCRIPTORS: SHARP;DISCOMFORT

## 2025-04-15 NOTE — PLAN OF CARE
Problem: Chronic Conditions and Co-morbidities  Goal: Patient's chronic conditions and co-morbidity symptoms are monitored and maintained or improved  4/14/2025 2053 by Althea Jacobson RN  Outcome: Progressing  4/14/2025 1135 by Jodi Nelson RN  Outcome: Progressing     Problem: Discharge Planning  Goal: Discharge to home or other facility with appropriate resources  4/14/2025 2053 by Althea Jacobson RN  Outcome: Progressing  4/14/2025 1135 by Jodi Nelson RN  Outcome: Progressing     Problem: Pain  Goal: Verbalizes/displays adequate comfort level or baseline comfort level  4/14/2025 2053 by Althea Jacobson RN  Outcome: Progressing  4/14/2025 1135 by Jodi Nelson RN  Outcome: Progressing     Problem: Respiratory - Adult  Goal: Achieves optimal ventilation and oxygenation  4/14/2025 2053 by Althea Jacobson RN  Outcome: Progressing  4/14/2025 1135 by Jodi Nelson RN  Outcome: Progressing     Problem: Skin/Tissue Integrity - Adult  Goal: Skin integrity remains intact  4/14/2025 2053 by Althea Jacobson RN  Outcome: Progressing  4/14/2025 1135 by Jodi Nelson RN  Outcome: Progressing     Problem: Infection - Adult  Goal: Absence of infection at discharge  4/14/2025 2053 by Althea Jacobson RN  Outcome: Progressing  4/14/2025 1135 by Jodi Nelson RN  Outcome: Progressing     Problem: Nutrition Deficit:  Goal: Optimize nutritional status  4/14/2025 2053 by Althea Jacobson RN  Outcome: Progressing  4/14/2025 1135 by Jodi Nelson RN  Outcome: Progressing

## 2025-04-15 NOTE — PROGRESS NOTES
Hospitalist   Progress Note    Patient Name: Stacie Donato  PCP: Samuel Sy MD  Date of Admission: 4/9/2025    Chief Complaint on Admission: Shortness of breath. Pt to ed c/o sob and chest pain that started yesterday.   Chief diagnosis after evaluation: Empyema.  Recurrent loculated left pleural effusion    Brief Synopsis: Patient is a 44 y.o. woman who has a past medical history of Asthma, Fatty liver, Obesity, Psoriasis, Psoriasis, and Type 2 diabetes mellitus. who was admitted on 4/9/2025 for evaluation and treatment of an Empyema. She has had a recurrent loculated left pleural effusion    Pt Seen/Examined and Chart Reviewed.     Subjective: Pt has no new complaints this morning    Objective:  Allergies  Patient has no known allergies.    Medications    Scheduled Meds:   piperacillin-tazobactam  4,500 mg IntraVENous Q8H    sodium chloride flush  5-40 mL IntraVENous 2 times per day    enoxaparin  30 mg SubCUTAneous BID    linezolid  600 mg IntraVENous Q12H    piperacillin-tazobactam  4,500 mg IntraVENous Once    insulin glargine  30 Units SubCUTAneous Nightly    lidocaine 1 % injection  50 mg IntraDERmal Once     Infusions:   sodium chloride 25 mL/hr at 04/14/25 1404    dextrose       PRN Meds:  calcium carbonate, sodium chloride flush, sodium chloride, potassium chloride **OR** potassium alternative oral replacement **OR** potassium chloride, magnesium sulfate, ondansetron **OR** ondansetron, polyethylene glycol, acetaminophen **OR** acetaminophen, famotidine, glucose, dextrose bolus **OR** dextrose bolus, glucagon (rDNA), dextrose, oxyCODONE-acetaminophen, melatonin    Physical    VITALS:  BP (!) 151/90   Pulse 67   Temp 97.5 °F (36.4 °C) (Oral)   Resp 16   Ht 1.524 m (5')   Wt 110 kg (242 lb 8.1 oz)   SpO2 95%   BMI 47.36 kg/m²   CONSTITUTIONAL:  Morbidly Obese 44 y.o. year-old female who is sitting in a chair awake, alert, cooperative, no apparent distress, and appears stated age  EYES:  Lids  and lashes normal, PERRL, EOMI, sclera clear, conjunctiva normal  ENT:  NC/AT, MMM    NECK:  Supple, symmetrical, trachea midline, no adenopathy  HEMATOLOGIC/LYMPHATICS:  no cervical, supraclavicular or axillary lymphadenopathy  LUNGS:  clear to auscultation bilaterally, No increased work of breathing, good air exchange, no crackles or wheezing  THORAX: Chest tube posterior aspect of left thorax  CARDIOVASCULAR:  Regular rate and rhythm, normal S1 and S2, no S3 or S4, and no significant murmurs, rubs or gallops noted. Normal apical impulse.   ABDOMEN:  Normal active bowel sounds, soft, non-tender, non-distended, no masses palpated, no organomegally  EXTREMITIES.  extremities atraumatic, no cyanosis or edema and Homans sign is negative, no sign of DVT.   MENTAL STATUS: Awake, alert, oriented to name, place and time.    NEUROLOGIC:  Cranial nerves II-XII are grossly intact.        Data    CBC with Differential:    Lab Results   Component Value Date/Time    WBC 7.4 04/15/2025 04:22 AM    HGB 11.4 04/15/2025 04:22 AM    HCT 33.3 04/15/2025 04:22 AM     04/15/2025 04:22 AM    MCV 92.2 04/15/2025 04:22 AM    RDW 13.5 04/15/2025 04:22 AM    LYMPHOPCT 32.9 04/15/2025 04:22 AM    MONOPCT 4.8 04/15/2025 04:22 AM    EOSPCT 2.1 04/15/2025 04:22 AM    BASOPCT 2.3 04/15/2025 04:22 AM    MONOSABS 0.4 04/15/2025 04:22 AM    LYMPHSABS 2.4 04/15/2025 04:22 AM    EOSABS 0.2 04/15/2025 04:22 AM    BASOSABS 0.2 04/15/2025 04:22 AM     BMP:    Lab Results   Component Value Date/Time     04/15/2025 04:22 AM    K 3.7 04/15/2025 04:22 AM     04/15/2025 04:22 AM    CO2 26 04/15/2025 04:22 AM    BUN 9 04/15/2025 04:22 AM    CREATININE 0.6 04/15/2025 04:22 AM    GLUCOSE 125 04/15/2025 04:22 AM    CALCIUM 8.4 04/15/2025 04:22 AM    GFRAA >60 07/22/2021 04:38 PM    LABGLOM >90 04/15/2025 04:22 AM     LFT:   Lab Results   Component Value Date/Time    ALKPHOS 99 04/09/2025 12:45 PM    ALT 9 04/09/2025 12:45 PM    AST 13

## 2025-04-15 NOTE — CONSULTS
bottle.~Blood Culture #2Culture, Blood 1 [[id number]]Collected: 03/07/25 1754Order Status: CompletedSpecimen: BloodUpdated: 03/11/25 1915Blood Culture, RoutineNo Growth after 4 days of incubation.Narrative:  ORDER#: [id number]                          ORDERED BY: ODALYS LEDBETTER  SOURCE: Blood                              COLLECTED:  03/07/25 17:54  ANTIBIOTICS AT JERROD.:                      RECEIVED :  03/07/25 18:14  If child <=2 yrs old please draw pediatric bottle.~Blood Culture 1Pneumonia Panel, Molecular [[id number]]Collected: 03/07/25 0000Order Status: CanceledSpecimen: Sputum ExpectoratedCOVID-19, Rapid [[id number]]Collected: 03/05/25 1102Order Status: CompletedSpecimen: Nasopharyngeal SwabUpdated: 03/05/25 7887PRPL-FdC-4, NAATNot DetectedRapid influenza A/B antigens [[id number]]Collected: 03/05/25 1102Order Status: CompletedSpecimen: NasopharyngealUpdated: 03/05/25 1142Influenza A, NAANot DetectedInfluenza B, NAANot Detected   Viral Culture:    Lab Results   Component Value Date/Time    COVID19 Not Detected 03/05/2025 11:02 AM    COVID19 Not Detected 07/22/2021 04:38 PM     Urine Culture: No results for input(s): \"LABURIN\" in the last 72 hours.    Scheduled Meds:   ALTEplase (CATHFLO) 10 mg in sodium chloride (PF) 0.9 % 30 mL  10 mg IntraPLEUral Once    And    dornase alpha (PULMOZYME) 5 mg in sterile water 30 mL  5 mg IntraPLEUral Once    piperacillin-tazobactam  4,500 mg IntraVENous Q8H    sodium chloride flush  5-40 mL IntraVENous 2 times per day    enoxaparin  30 mg SubCUTAneous BID    linezolid  600 mg IntraVENous Q12H    piperacillin-tazobactam  4,500 mg IntraVENous Once    insulin glargine  30 Units SubCUTAneous Nightly    insulin lispro  11 Units SubCUTAneous TID WC    lidocaine 1 % injection  50 mg IntraDERmal Once       Continuous Infusions:   sodium chloride 25 mL/hr at 04/14/25 1404    dextrose         PRN Meds:  calcium carbonate, sodium chloride flush, sodium chloride, potassium chloride **OR**  opacification        Given the complicated course with recurrent pleural effusion now with empyema diabetes morbid obesity will need IV antibiotic at discharge.  She has a PICC line in place.  Patient works at [employer] in the [employer] department.      Labs, Microbiology, Radiology and pertinent results from current hospitalization and care every where were reviewed by me as a part of the consultation.    PLAN :  Agree with IV piperacillin/tazobactam X 4.5 GM q 8 HR  Continue IV linezolid 600 mg every 12  Pleural fluid culture in process  Add pleural fungal and AFB cultures  Pleural fluid cell count predominantly neutrophilic indicating bacterial process  CT chest reviewed  Controlled diabetes  Hold immunosuppression  TB Quantiferon Negative and   negative fungal antibodies negative, histoplasma antigen negative, Blastomyces antigen negative  Beta D glucan was positive on previous admission could be affected by piperacillin/tazobactam  11.  Will change to IV ertapenem 1 g once a day for discharge planning for 3 weeks  12.  Patient will come to infusion center daily for her antibiotics  Discussed with patient/Family and Nursing     Medical Decision Making:  The following items were considered in medical decision making:  Discussion of patient care with other providers  Reviewed clinical lab tests  Reviewed radiology tests  Reviewed other diagnostic tests/interventions  Independent review of radiologic images  Independent review of  Microbiology cultures and other micro tests reviewed       Risk of Complications/Morbidity: High      Illness(es)/ Infection present that pose threat to bodily function.   There is potential for severe exacerbation of infection/side effects of treatment.  Therapy requires intensive monitoring for antimicrobial agent toxicity.  Review of Antibiotic resistance patterns and lab results  Management decisions including changes in Antimicrobial therapy and infection control

## 2025-04-15 NOTE — CARE COORDINATION
Chart review done, rounds completed, likely another 2- 3 days, pt has chest tube in and currently on IVABs- zosyn, pending cultures with pulmonary. Will continue to follow for dc planning needs.   If IV abx are needed, the outpatient infusion center may be an option.  SVDP and clinic information has been provided to pt at previous admission.   SW will continue to follow to assist with d/c planning needs.     Rey Noguera LMSW, [facility] Social Work Case Management   Phone: [phone]  Fax: [phone]

## 2025-04-15 NOTE — PROGRESS NOTES
Chest tube unclamped @ 1340 per Dr. Tracy, Patient back on -20cm continuous suction. Patient up to chair in room. No distress noted. Family at bedside.

## 2025-04-15 NOTE — PROGRESS NOTES
Pulmonary Progress Note    Date of Admission: 4/9/2025   LOS: 6 days     Chief Complaint   Patient presents with    Shortness of Breath     Pt to ed c/o sob and chest pain that started yesterday.        Assessment/Plan:       Empyema, left  -Status post chest tube  - Not much in the way of output, chest x-ray stable  - Will need to repeat tPA DNase today, CT better  -Continue antibiotics, strep epidermidis in culture      24 Hour Events/Subjective  No acute events overnight.  CT today looks better.    ROS:   No nausea  No Vomiting  No chest pain      Intake/Output Summary (Last 24 hours) at 4/15/2025 1135  Last data filed at 4/15/2025 0931  Gross per 24 hour   Intake 120 ml   Output 470 ml   Net -350 ml           PHYSICAL EXAM:   Blood pressure (!) 158/97, pulse 67, temperature 97.5 °F (36.4 °C), temperature source Oral, resp. rate 16, height 1.524 m (5'), weight 110 kg (242 lb 8.1 oz), SpO2 95%, not currently breastfeeding.'  Gen:  No acute distress.   Resp:  No crackles. No wheezes. No rhonchi. No dullness on percussion.  CV: Regular rate. Regular rhythm. No murmur or rub. No edema.   Neuro:  no focal neurologic deficit.  Moves all extremities  Psych: Awake and alert  Mood stable.      Labs reviewed:  CBC:   Recent Labs     04/13/25  0610 04/14/25  0600 04/15/25  0422   WBC 7.6 8.1 7.4   HGB 10.5* 10.8* 11.4*   HCT 30.4* 30.8* 33.3*   MCV 92.9 92.5 92.2    390 384     BMP:   Recent Labs     04/13/25  0610 04/14/25  0600 04/15/25  0422    137 137   K 3.5 3.6 3.7    101 101   CO2 27 27 26   BUN 11 9 9   CREATININE 0.5* 0.5* 0.6     LIVER PROFILE:   No results for input(s): \"AST\", \"ALT\", \"LIPASE\", \"AMYLASE\", \"BILIDIR\", \"BILITOT\", \"ALKPHOS\" in the last 72 hours.    Invalid input(s): \"ALB\"    PT/INR: No results for input(s): \"PROTIME\", \"INR\" in the last 72 hours.        Films:  Radiology Review:  Pertinent images / reports were reviewed as a part of this visit.    CT chest imaging reviewed personally

## 2025-04-15 NOTE — PROCEDURES
PROCEDURE NOTE  Date: 4/15/2025   Name: Stacie Donato  YOB: 1980        TPA/Dornase Instillation Note    Procedure for TPA/Dornase instillation explained to patient. Chest tube clamped.  Luer lock prepped with alcohol. TPA/Dornase infused then flushed with saline.  Nurse instructed to unclamp chest tube in one hour. Place back to -20 cm continuous suction. Patient tolerated procedure well.    Osmin Tracy MD  [facility]

## 2025-04-16 ENCOUNTER — APPOINTMENT (OUTPATIENT)
Dept: GENERAL RADIOLOGY | Age: 45
End: 2025-04-16
Payer: COMMERCIAL

## 2025-04-16 LAB
ACID FAST STN SPEC QL: NORMAL
ANION GAP SERPL CALCULATED.3IONS-SCNC: 9 MMOL/L (ref 3–16)
BASOPHILS # BLD: 0 K/UL (ref 0–0.2)
BASOPHILS NFR BLD: 0.4 %
BUN SERPL-MCNC: 5 MG/DL (ref 7–20)
CALCIUM SERPL-MCNC: 8.6 MG/DL (ref 8.3–10.6)
CHLORIDE SERPL-SCNC: 100 MMOL/L (ref 99–110)
CO2 SERPL-SCNC: 28 MMOL/L (ref 21–32)
CREAT SERPL-MCNC: 0.6 MG/DL (ref 0.6–1.1)
CRP SERPL-MCNC: 8.6 MG/L (ref 0–5.1)
DEPRECATED RDW RBC AUTO: 13 % (ref 12.4–15.4)
EOSINOPHIL # BLD: 0.2 K/UL (ref 0–0.6)
EOSINOPHIL NFR BLD: 3.6 %
ERYTHROCYTE [SEDIMENTATION RATE] IN BLOOD BY WESTERGREN METHOD: 41 MM/HR (ref 0–20)
GFR SERPLBLD CREATININE-BSD FMLA CKD-EPI: >90 ML/MIN/{1.73_M2}
GLUCOSE BLD-MCNC: 133 MG/DL (ref 70–99)
GLUCOSE BLD-MCNC: 143 MG/DL (ref 70–99)
GLUCOSE BLD-MCNC: 172 MG/DL (ref 70–99)
GLUCOSE BLD-MCNC: 183 MG/DL (ref 70–99)
GLUCOSE SERPL-MCNC: 155 MG/DL (ref 70–99)
HCT VFR BLD AUTO: 32.1 % (ref 36–48)
HGB BLD-MCNC: 11 G/DL (ref 12–16)
LOEFFLER MB STN SPEC: NORMAL
LYMPHOCYTES # BLD: 1.8 K/UL (ref 1–5.1)
LYMPHOCYTES NFR BLD: 28.5 %
MCH RBC QN AUTO: 31.5 PG (ref 26–34)
MCHC RBC AUTO-ENTMCNC: 34.2 G/DL (ref 31–36)
MCV RBC AUTO: 92 FL (ref 80–100)
MONOCYTES # BLD: 0.4 K/UL (ref 0–1.3)
MONOCYTES NFR BLD: 5.8 %
NEUTROPHILS # BLD: 3.8 K/UL (ref 1.7–7.7)
NEUTROPHILS NFR BLD: 61.7 %
PERFORMED ON: ABNORMAL
PLATELET # BLD AUTO: 327 K/UL (ref 135–450)
PMV BLD AUTO: 6.8 FL (ref 5–10.5)
POTASSIUM SERPL-SCNC: 3.7 MMOL/L (ref 3.5–5.1)
RBC # BLD AUTO: 3.49 M/UL (ref 4–5.2)
SODIUM SERPL-SCNC: 137 MMOL/L (ref 136–145)
WBC # BLD AUTO: 6.2 K/UL (ref 4–11)

## 2025-04-16 PROCEDURE — 2580000003 HC RX 258: Performed by: INTERNAL MEDICINE

## 2025-04-16 PROCEDURE — 85025 COMPLETE CBC W/AUTO DIFF WBC: CPT

## 2025-04-16 PROCEDURE — 6360000002 HC RX W HCPCS: Performed by: INTERNAL MEDICINE

## 2025-04-16 PROCEDURE — 6360000002 HC RX W HCPCS

## 2025-04-16 PROCEDURE — 85652 RBC SED RATE AUTOMATED: CPT

## 2025-04-16 PROCEDURE — 6370000000 HC RX 637 (ALT 250 FOR IP): Performed by: INTERNAL MEDICINE

## 2025-04-16 PROCEDURE — 94760 N-INVAS EAR/PLS OXIMETRY 1: CPT

## 2025-04-16 PROCEDURE — 71045 X-RAY EXAM CHEST 1 VIEW: CPT

## 2025-04-16 PROCEDURE — 86140 C-REACTIVE PROTEIN: CPT

## 2025-04-16 PROCEDURE — 99233 SBSQ HOSP IP/OBS HIGH 50: CPT | Performed by: INTERNAL MEDICINE

## 2025-04-16 PROCEDURE — 6370000000 HC RX 637 (ALT 250 FOR IP): Performed by: NURSE PRACTITIONER

## 2025-04-16 PROCEDURE — 6370000000 HC RX 637 (ALT 250 FOR IP)

## 2025-04-16 PROCEDURE — 1200000000 HC SEMI PRIVATE

## 2025-04-16 PROCEDURE — 80048 BASIC METABOLIC PNL TOTAL CA: CPT

## 2025-04-16 RX ADMIN — PIPERACILLIN AND TAZOBACTAM 4500 MG: 4; .5 INJECTION, POWDER, LYOPHILIZED, FOR SOLUTION INTRAVENOUS at 05:28

## 2025-04-16 RX ADMIN — INSULIN GLARGINE 30 UNITS: 100 INJECTION, SOLUTION SUBCUTANEOUS at 21:37

## 2025-04-16 RX ADMIN — ENOXAPARIN SODIUM 30 MG: 100 INJECTION SUBCUTANEOUS at 08:55

## 2025-04-16 RX ADMIN — PIPERACILLIN AND TAZOBACTAM 4500 MG: 4; .5 INJECTION, POWDER, LYOPHILIZED, FOR SOLUTION INTRAVENOUS at 14:34

## 2025-04-16 RX ADMIN — OXYCODONE AND ACETAMINOPHEN 1 TABLET: 325; 5 TABLET ORAL at 05:40

## 2025-04-16 RX ADMIN — LINEZOLID 600 MG: 600 INJECTION, SOLUTION INTRAVENOUS at 16:30

## 2025-04-16 RX ADMIN — OXYCODONE AND ACETAMINOPHEN 1 TABLET: 325; 5 TABLET ORAL at 14:28

## 2025-04-16 RX ADMIN — LINEZOLID 600 MG: 600 INJECTION, SOLUTION INTRAVENOUS at 04:04

## 2025-04-16 RX ADMIN — OXYCODONE AND ACETAMINOPHEN 1 TABLET: 325; 5 TABLET ORAL at 21:37

## 2025-04-16 RX ADMIN — PIPERACILLIN AND TAZOBACTAM 4500 MG: 4; .5 INJECTION, POWDER, LYOPHILIZED, FOR SOLUTION INTRAVENOUS at 21:44

## 2025-04-16 RX ADMIN — Medication 1 LOZENGE: at 21:48

## 2025-04-16 RX ADMIN — Medication 1 LOZENGE: at 14:36

## 2025-04-16 RX ADMIN — Medication 6 MG: at 21:37

## 2025-04-16 ASSESSMENT — PAIN DESCRIPTION - ORIENTATION
ORIENTATION: LEFT

## 2025-04-16 ASSESSMENT — PAIN SCALES - GENERAL
PAINLEVEL_OUTOF10: 7
PAINLEVEL_OUTOF10: 7
PAINLEVEL_OUTOF10: 4

## 2025-04-16 ASSESSMENT — PAIN DESCRIPTION - DESCRIPTORS
DESCRIPTORS: DISCOMFORT
DESCRIPTORS: ACHING

## 2025-04-16 ASSESSMENT — PAIN SCALES - WONG BAKER: WONGBAKER_NUMERICALRESPONSE: HURTS EVEN MORE

## 2025-04-16 ASSESSMENT — PAIN DESCRIPTION - LOCATION
LOCATION: BACK

## 2025-04-16 NOTE — PROGRESS NOTES
Pt alert and oriented. VSS. Pt complains of pain where chest tube is located. Ambulation tolerated well. Pt tube draining. Cammode at the bedside. Pt denies further needs at this time. Bed in low position call light within reach.     Electronically signed by Althea Jacobson RN on 4/15/2025 at 10:15 PM

## 2025-04-16 NOTE — PROGRESS NOTES
Comprehensive Nutrition Assessment    Type and Reason for Visit:  Reassess    Nutrition Recommendations/Plan:   Continue 3 carb diet, continue glucerna daily, monitor intakes, BG     Malnutrition Assessment:  Malnutrition Status:  No malnutrition (04/11/25 1521)      Nutrition Assessment:    Follow up. Pt remains on 3 carb diet w/ daily glucerna ONS. Pt eating well, finishing meals, taking ONS. WIll continue current order for glucose control between meals. No changes to diet order needed at this time, continue to monitor.    Nutrition Related Findings:    172 glucose Wound Type: None       Current Nutrition Intake & Therapies:    Average Meal Intake: %  Average Supplements Intake: %  ADULT DIET; Regular; 3 carb choices (45 gm/meal)  ADULT ORAL NUTRITION SUPPLEMENT; Breakfast; Diabetic Oral Supplement    Anthropometric Measures:  Height: 152.4 cm (5')  Ideal Body Weight (IBW): 100 lbs (45 kg)       Current Body Weight: 106.1 kg (233 lb 14.5 oz),   IBW.    Current BMI (kg/m2): 45.7                                  Estimated Daily Nutrient Needs:  Energy Requirements Based On: Kcal/kg  Weight Used for Energy Requirements: Current  Energy (kcal/day): 1275 - 1590 (12 - 15 kcal/kg)  Weight Used for Protein Requirements: Ideal  Protein (g/day): 68 - 91 (1.5 - 2 g/kg IBW)  Method Used for Fluid Requirements: 1 ml/kcal  Fluid (ml/day): or per provider    Nutrition Diagnosis:   Unintended weight loss related to inadequate protein-energy intake as evidenced by weight loss    Nutrition Interventions:   Food and/or Nutrient Delivery: Continue Current Diet, Continue Oral Nutrition Supplement  Nutrition Education/Counseling: No recommendation at this time  Coordination of Nutrition Care: No recommendation at this time       Goals:  Goals: Meet at least 75% of estimated needs, by next RD assessment  Type of Goal: Continue current goal  Previous Goal Met: Goal(s) Achieved    Nutrition Monitoring and Evaluation:

## 2025-04-16 NOTE — PROGRESS NOTES
Infectious Diseases Inpatient Progress  Note        CHIEF COMPLAINT:     Empyema  Recurrent pleural effusion  Left lower lobe pneumonia  Morbid obesity  WBC elevation  Chest tube in place      HISTORY OF PRESENT ILLNESS:  44 y.o. female with significant history for asthma, fatty liver, obesity BMI 47, psoriasis, diabetes, poor control was recently admitted to [facility] from 3/7/25 to  3/18/25, prolonged hospitalization secondary to pneumonia left pleural effusion exudative effusion requiring thoracentesis on previous admission she was immunosuppressed while taking biologic for her psoriatic arthritis but she has been off treatment since January.  She does have poor dentition.  He was on IV antibiotics with eventually discharged home on oral Augmentin for 3 weeks course for pneumonia and then left exudative pleural effusion.  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  Respiratory culture on previous admission group B strep pneumonia molecular panel group B strep, TB QuantiFERON negative, pleural fluid cultures negative including AFB and fungal.      Patient now readmitted to hospital secondary to ongoing left-sided chest pain associated with sweating fever shortness of breath not feeling well.  On admission labs indicate sodium 135 creatinine 0.9  WBC elevated 15 CT chest indicated left lower lobe effusion with consolidation patient underwent chest tube placement pleural fluid analysis indicating empyema pH at 7, LDH greater than 2500, neutrophils greater than 50,000, 93% neutrophil consistent with empyema pleural fluid culture Gram stain gram-positive cocci in chains resembling streptococci blood culture positive for coagulase-negative staph.  Blood cultures have been negative given the ongoing need for IV antibiotics we are consulted for recommendations      Interval history: Chest tube in place ongoing left-sided pain slowly improving able to oxygenate well on room air WBC 6.2 hemoglobin  12:59  ANTIBIOTICS AT JERROD.:                      RECEIVED :  04/09/25 13:56  If child <=2 yrs old please draw pediatric bottle.~Blood Culture #2   Culture, Body Fluid (with Gram Stain) [[id number]] (Abnormal)  Collected: 04/10/25 1438   Order Status: Completed Specimen: Body Fluid from Thoracentesis Updated: 04/13/25 0707    Gram Stain Result 4+ WBC's (Polymorphonuclear)  No Epithelial Cells seen  3+ Gram positive cocci  in chains - resembling Strep   Abnormal     Organism Staphylococcus epidermidis Abnormal     Body Fluid Culture, Sterile Rare growth   Narrative:     ORDER#: [id number]                          ORDERED BY: EDY CHAVARRIA  SOURCE: Thoracentesis                      COLLECTED:  04/10/25 14:38  ANTIBIOTICS AT JERROD.:                      RECEIVED :  04/10/25 14:57   Pneumonia Panel, Molecular [[id number]]    Order Status: Sent Specimen: Sputum    Culture, Respiratory (with Gram Stain) [[id number]]    Order Status: Sent Specimen: Sputum Expectorated      Blood Culture:   Lab Results   Component Value Date/Time    BC No Growth after 4 days of incubation. 04/09/2025 12:59 PM    BLOODCULT2 No Growth after 4 days of incubation. 04/09/2025 12:59 PM       SOURCE: Pleural                            COLLECTED:  03/18/25 15:05  ANTIBIOTICS AT JERROD.:                      RECEIVED :  03/18/25 15:42Culture, Body Fluid (with Gram Stain) [[id number]]Collected: 03/12/25 1545Order Status: CompletedSpecimen: Body Fluid from ThoracentesisUpdated: 03/15/25 0829Body Fluid Culture, SterileNo growth 60 to 72 hoursGram Stain ResultNo Epithelial Cells seen  1+ WBC's (Polymorphonuclear)  No organisms seen  Narrative:  ORDER#: [id number]                          ORDERED BY: EDY CHAVARRIA  SOURCE: Thoracentesis                      COLLECTED:  03/12/25 15:45  ANTIBIOTICS AT JERROD.:                      RECEIVED :  03/12/25 15:45Culture, Respiratory [[id number]] (Abnormal)Collected: 03/10/25 1816Order Status: CompletedSpecimen:

## 2025-04-16 NOTE — PROGRESS NOTES
Hospitalist   Progress Note    Patient Name: Stacie Donato  PCP: Samuel Sy MD  Date of Admission: 4/9/2025    Chief Complaint on Admission: Shortness of breath. Pt to ed c/o sob and chest pain that started yesterday.   Chief diagnosis after evaluation: Empyema.  Recurrent loculated left pleural effusion    Brief Synopsis: Patient is a 44 y.o. woman who has a past medical history of Asthma, Fatty liver, Obesity, Psoriasis, Psoriasis, and Type 2 diabetes mellitus. who was admitted on 4/9/2025 for evaluation and treatment of an Empyema. She has had a recurrent loculated left pleural effusion    Pt Seen/Examined and Chart Reviewed.     Subjective: Pt c/o sore throat this morning    Objective:  Allergies  Patient has no known allergies.    Medications    Scheduled Meds:   piperacillin-tazobactam  4,500 mg IntraVENous Q8H    sodium chloride flush  5-40 mL IntraVENous 2 times per day    enoxaparin  30 mg SubCUTAneous BID    linezolid  600 mg IntraVENous Q12H    piperacillin-tazobactam  4,500 mg IntraVENous Once    insulin glargine  30 Units SubCUTAneous Nightly    lidocaine 1 % injection  50 mg IntraDERmal Once     Infusions:   sodium chloride 25 mL/hr at 04/14/25 1404    dextrose       PRN Meds:  benzocaine-menthol, calcium carbonate, sodium chloride flush, sodium chloride, potassium chloride **OR** potassium alternative oral replacement **OR** potassium chloride, magnesium sulfate, ondansetron **OR** ondansetron, polyethylene glycol, acetaminophen **OR** acetaminophen, famotidine, glucose, dextrose bolus **OR** dextrose bolus, glucagon (rDNA), dextrose, oxyCODONE-acetaminophen, melatonin    Physical    VITALS:  BP (!) 150/91   Pulse 69   Temp 98.4 °F (36.9 °C)   Resp 17   Ht 1.524 m (5')   Wt 109.4 kg (241 lb 3.2 oz)   SpO2 99%   BMI 47.11 kg/m²   CONSTITUTIONAL:  Morbidly Obese 44 y.o. year-old female who is sitting in a chair awake, alert, cooperative, no apparent distress, and appears stated

## 2025-04-16 NOTE — DISCHARGE INSTR - COC
Continuity of Care Form    Patient Name: Stacie Donato   :  1980  MRN:  [id number]    Admit date:  2025  Discharge date:  ***    Code Status Order: Full Code   Advance Directives:     Admitting Physician:  Aj Craft MD  PCP: Samuel Sy MD    Discharging Nurse: ***  Discharging Hospital Unit/Room#: Q2K-7960/4256-01  Discharging Unit Phone Number: ***    Emergency Contact:   Extended Emergency Contact Information  Primary Emergency Contact: Jasmin Galarza  Home Phone: [phone]  Relation: Other    Past Surgical History:  Past Surgical History:   Procedure Laterality Date    CHOLECYSTECTOMY      CT GUIDED CHEST TUBE  4/10/2025    CT GUIDED CHEST TUBE 4/10/2025 [facility] CT       Immunization History:     There is no immunization history on file for this patient.    Active Problems:  Patient Active Problem List   Diagnosis Code    Pleural effusion on left J90    Pulmonary nodules R91.8    Left-sided chest pain R07.9    Immunocompromised D84.9    Acute septic pulmonary embolism (HCC) I26.90    Shortness of breath R06.02    Cavitary lung disease J98.4    Poorly controlled diabetes mellitus (HCC) E11.65    Morbid obesity with BMI of 45.0-49.9, adult E66.01, Z68.42    Smoking [id number]    Elevated erythrocyte sedimentation rate R70.0    CRP elevated R79.82    Abnormal CT of the chest R93.89    MRSA colonization Z22.322    Group B streptococcal infection A49.1    Pleurisy with effusion J90    Pleural effusion J90    Cavitary pneumonia J18.9, J98.4    Type 2 diabetes mellitus without complication, with long-term current use of insulin E11.9, Z79.4    Empyema (HCC) J86.9    Hypomagnesemia E83.42    Neutrophilia D72.828    Chest tube in place Z96.89    Recurrent left pleural effusion J90       Isolation/Infection:   Isolation            Contact          Patient Infection Status        Infection Onset Added Last Indicated Last Indicated By Review Planned Expiration    MRSA 25 MRSA

## 2025-04-16 NOTE — PLAN OF CARE
Problem: Chronic Conditions and Co-morbidities  Goal: Patient's chronic conditions and co-morbidity symptoms are monitored and maintained or improved  4/16/2025 0806 by Shyla Costa RN  Outcome: Progressing  4/16/2025 0215 by Althea Jacobson RN  Outcome: Progressing     Problem: Discharge Planning  Goal: Discharge to home or other facility with appropriate resources  4/16/2025 0806 by Shyla Costa RN  Outcome: Progressing  4/16/2025 0215 by Althea Jacobson RN  Outcome: Progressing     Problem: Pain  Goal: Verbalizes/displays adequate comfort level or baseline comfort level  4/16/2025 0806 by Shyla Costa RN  Outcome: Progressing  4/16/2025 0215 by Althea Jacobson RN  Outcome: Progressing     Problem: Respiratory - Adult  Goal: Achieves optimal ventilation and oxygenation  4/16/2025 0806 by Shyla Costa RN  Outcome: Progressing  4/16/2025 0215 by Althea Jacobson RN  Outcome: Progressing     Problem: Skin/Tissue Integrity - Adult  Goal: Skin integrity remains intact  4/16/2025 0215 by Althea Jacobson RN  Outcome: Progressing     Problem: Infection - Adult  Goal: Absence of infection at discharge  4/16/2025 0806 by Shyla Costa RN  Outcome: Progressing  4/16/2025 0215 by Althea Jacobson RN  Outcome: Progressing     Problem: Nutrition Deficit:  Goal: Optimize nutritional status  4/16/2025 0806 by Shyla Costa RN  Outcome: Progressing  4/16/2025 0215 by Althea Jacobson RN  Outcome: Progressing

## 2025-04-16 NOTE — PROGRESS NOTES
Pulmonary Progress Note    Date of Admission: 4/9/2025   LOS: 7 days     Chief Complaint   Patient presents with    Shortness of Breath     Pt to ed c/o sob and chest pain that started yesterday.        Assessment/Plan:       Empyema, left.  Staph epidermidis  -Status post chest tube  - Increased output overnight following third dose of tPA/Dnase  - X-ray still with left-sided airspace disease  - Follow chest tube output over the next 24 hours if decreases then may be able to pull tomorrow morning.  - Plan is for 3 weeks of IV or ertapenem at home.  ID following.      24 Hour Events/Subjective  No acute events overnight.  CT today looks better.    ROS:   No nausea  No Vomiting  No chest pain      Intake/Output Summary (Last 24 hours) at 4/16/2025 0829  Last data filed at 4/16/2025 0541  Gross per 24 hour   Intake 120 ml   Output 30 ml   Net 90 ml           PHYSICAL EXAM:   Blood pressure (!) 148/86, pulse 72, temperature 97.9 °F (36.6 °C), resp. rate 16, height 1.524 m (5'), weight 109.4 kg (241 lb 3.2 oz), SpO2 96%, not currently breastfeeding.'  Gen:  No acute distress.   Resp:  No crackles. No wheezes. No rhonchi. No dullness on percussion.  CV: Regular rate. Regular rhythm. No murmur or rub. No edema.   Neuro:  no focal neurologic deficit.  Moves all extremities  Psych: Awake and alert  Mood stable.      Labs reviewed:  CBC:   Recent Labs     04/14/25  0600 04/15/25  0422 04/16/25  0530   WBC 8.1 7.4 6.2   HGB 10.8* 11.4* 11.0*   HCT 30.8* 33.3* 32.1*   MCV 92.5 92.2 92.0    384 327     BMP:   Recent Labs     04/14/25  0600 04/15/25  0422 04/16/25  0530    137 137   K 3.6 3.7 3.7    101 100   CO2 27 26 28   BUN 9 9 5*   CREATININE 0.5* 0.6 0.6     LIVER PROFILE:   No results for input(s): \"AST\", \"ALT\", \"LIPASE\", \"AMYLASE\", \"BILIDIR\", \"BILITOT\", \"ALKPHOS\" in the last 72 hours.    Invalid input(s): \"ALB\"    PT/INR: No results for input(s): \"PROTIME\", \"INR\" in the last 72

## 2025-04-16 NOTE — CARE COORDINATION
Chart review done, per pulmonary, - Follow chest tube output over the next 24 hours if decreases then may be able to pull tomorrow morning. Plan is for 3 weeks of IV or ertapenem at home.  ID following.    Will continue to follow.       Rey Noguera LMSW, [facility] Social Work Case Management   Phone: [phone]  Fax: [phone]

## 2025-04-17 ENCOUNTER — APPOINTMENT (OUTPATIENT)
Dept: GENERAL RADIOLOGY | Age: 45
End: 2025-04-17
Payer: COMMERCIAL

## 2025-04-17 PROBLEM — J18.9 PNEUMONIA OF LEFT LOWER LOBE DUE TO INFECTIOUS ORGANISM: Status: ACTIVE | Noted: 2025-04-17

## 2025-04-17 PROBLEM — Z45.2 PERIPHERALLY INSERTED CENTRAL CATHETER (PICC) IN PLACE: Status: ACTIVE | Noted: 2025-04-17

## 2025-04-17 LAB
ANION GAP SERPL CALCULATED.3IONS-SCNC: 9 MMOL/L (ref 3–16)
BASOPHILS # BLD: 0 K/UL (ref 0–0.2)
BASOPHILS NFR BLD: 0.7 %
BUN SERPL-MCNC: 6 MG/DL (ref 7–20)
CALCIUM SERPL-MCNC: 8.8 MG/DL (ref 8.3–10.6)
CHLORIDE SERPL-SCNC: 100 MMOL/L (ref 99–110)
CO2 SERPL-SCNC: 29 MMOL/L (ref 21–32)
CREAT SERPL-MCNC: 0.5 MG/DL (ref 0.6–1.1)
DEPRECATED RDW RBC AUTO: 12.8 % (ref 12.4–15.4)
EOSINOPHIL # BLD: 0.3 K/UL (ref 0–0.6)
EOSINOPHIL NFR BLD: 4.7 %
GFR SERPLBLD CREATININE-BSD FMLA CKD-EPI: >90 ML/MIN/{1.73_M2}
GLUCOSE BLD-MCNC: 144 MG/DL (ref 70–99)
GLUCOSE BLD-MCNC: 170 MG/DL (ref 70–99)
GLUCOSE BLD-MCNC: 171 MG/DL (ref 70–99)
GLUCOSE SERPL-MCNC: 162 MG/DL (ref 70–99)
HCT VFR BLD AUTO: 30.8 % (ref 36–48)
HGB BLD-MCNC: 10.8 G/DL (ref 12–16)
LYMPHOCYTES # BLD: 1.7 K/UL (ref 1–5.1)
LYMPHOCYTES NFR BLD: 30.6 %
MCH RBC QN AUTO: 32.4 PG (ref 26–34)
MCHC RBC AUTO-ENTMCNC: 35 G/DL (ref 31–36)
MCV RBC AUTO: 92.5 FL (ref 80–100)
MONOCYTES # BLD: 0.3 K/UL (ref 0–1.3)
MONOCYTES NFR BLD: 5.6 %
NEUTROPHILS # BLD: 3.3 K/UL (ref 1.7–7.7)
NEUTROPHILS NFR BLD: 58.4 %
PERFORMED ON: ABNORMAL
PLATELET # BLD AUTO: 277 K/UL (ref 135–450)
PMV BLD AUTO: 6.8 FL (ref 5–10.5)
POTASSIUM SERPL-SCNC: 3.7 MMOL/L (ref 3.5–5.1)
RBC # BLD AUTO: 3.33 M/UL (ref 4–5.2)
REPORT: NORMAL
RESP PATH DNA+RNA PNL NPH NAA+NON-PROBE: NORMAL
SODIUM SERPL-SCNC: 138 MMOL/L (ref 136–145)
WBC # BLD AUTO: 5.7 K/UL (ref 4–11)

## 2025-04-17 PROCEDURE — 6370000000 HC RX 637 (ALT 250 FOR IP)

## 2025-04-17 PROCEDURE — 94760 N-INVAS EAR/PLS OXIMETRY 1: CPT

## 2025-04-17 PROCEDURE — 6360000002 HC RX W HCPCS: Performed by: INTERNAL MEDICINE

## 2025-04-17 PROCEDURE — 6370000000 HC RX 637 (ALT 250 FOR IP): Performed by: INTERNAL MEDICINE

## 2025-04-17 PROCEDURE — 1200000000 HC SEMI PRIVATE

## 2025-04-17 PROCEDURE — 0202U NFCT DS 22 TRGT SARS-COV-2: CPT

## 2025-04-17 PROCEDURE — 80048 BASIC METABOLIC PNL TOTAL CA: CPT

## 2025-04-17 PROCEDURE — 99233 SBSQ HOSP IP/OBS HIGH 50: CPT | Performed by: INTERNAL MEDICINE

## 2025-04-17 PROCEDURE — 2580000003 HC RX 258: Performed by: INTERNAL MEDICINE

## 2025-04-17 PROCEDURE — 85025 COMPLETE CBC W/AUTO DIFF WBC: CPT

## 2025-04-17 PROCEDURE — 2500000003 HC RX 250 WO HCPCS

## 2025-04-17 PROCEDURE — 6370000000 HC RX 637 (ALT 250 FOR IP): Performed by: NURSE PRACTITIONER

## 2025-04-17 PROCEDURE — 71045 X-RAY EXAM CHEST 1 VIEW: CPT

## 2025-04-17 RX ORDER — HYDROCORTISONE SODIUM SUCCINATE 100 MG/2ML
100 INJECTION INTRAMUSCULAR; INTRAVENOUS
Status: CANCELLED | OUTPATIENT
Start: 2025-04-18

## 2025-04-17 RX ORDER — EPINEPHRINE 1 MG/ML
0.3 INJECTION, SOLUTION, CONCENTRATE INTRAVENOUS PRN
Status: CANCELLED | OUTPATIENT
Start: 2025-04-18

## 2025-04-17 RX ORDER — HEPARIN 100 UNIT/ML
500 SYRINGE INTRAVENOUS PRN
Status: CANCELLED | OUTPATIENT
Start: 2025-04-18

## 2025-04-17 RX ORDER — SODIUM CHLORIDE 0.9 % (FLUSH) 0.9 %
5-40 SYRINGE (ML) INJECTION PRN
Status: CANCELLED | OUTPATIENT
Start: 2025-04-18

## 2025-04-17 RX ORDER — ACETAMINOPHEN 325 MG/1
650 TABLET ORAL
Status: CANCELLED | OUTPATIENT
Start: 2025-04-18

## 2025-04-17 RX ORDER — ALBUTEROL SULFATE 90 UG/1
4 INHALANT RESPIRATORY (INHALATION) PRN
Status: CANCELLED | OUTPATIENT
Start: 2025-04-18

## 2025-04-17 RX ORDER — SODIUM CHLORIDE 9 MG/ML
5-250 INJECTION, SOLUTION INTRAVENOUS PRN
Status: CANCELLED | OUTPATIENT
Start: 2025-04-18

## 2025-04-17 RX ORDER — SODIUM CHLORIDE 9 MG/ML
INJECTION, SOLUTION INTRAVENOUS CONTINUOUS
Status: CANCELLED | OUTPATIENT
Start: 2025-04-18

## 2025-04-17 RX ORDER — DIPHENHYDRAMINE HYDROCHLORIDE 50 MG/ML
50 INJECTION, SOLUTION INTRAMUSCULAR; INTRAVENOUS
Status: CANCELLED | OUTPATIENT
Start: 2025-04-18

## 2025-04-17 RX ORDER — ONDANSETRON 2 MG/ML
8 INJECTION INTRAMUSCULAR; INTRAVENOUS
Status: CANCELLED | OUTPATIENT
Start: 2025-04-18

## 2025-04-17 RX ADMIN — LINEZOLID 600 MG: 600 INJECTION, SOLUTION INTRAVENOUS at 03:55

## 2025-04-17 RX ADMIN — PIPERACILLIN AND TAZOBACTAM 4500 MG: 4; .5 INJECTION, POWDER, LYOPHILIZED, FOR SOLUTION INTRAVENOUS at 14:35

## 2025-04-17 RX ADMIN — LINEZOLID 600 MG: 600 INJECTION, SOLUTION INTRAVENOUS at 15:40

## 2025-04-17 RX ADMIN — Medication 6 MG: at 21:01

## 2025-04-17 RX ADMIN — PIPERACILLIN AND TAZOBACTAM 4500 MG: 4; .5 INJECTION, POWDER, LYOPHILIZED, FOR SOLUTION INTRAVENOUS at 23:06

## 2025-04-17 RX ADMIN — SODIUM CHLORIDE, PRESERVATIVE FREE 10 ML: 5 INJECTION INTRAVENOUS at 21:03

## 2025-04-17 RX ADMIN — Medication 1 LOZENGE: at 09:27

## 2025-04-17 RX ADMIN — INSULIN GLARGINE 30 UNITS: 100 INJECTION, SOLUTION SUBCUTANEOUS at 20:59

## 2025-04-17 RX ADMIN — Medication 1 LOZENGE: at 20:21

## 2025-04-17 RX ADMIN — OXYCODONE AND ACETAMINOPHEN 1 TABLET: 325; 5 TABLET ORAL at 09:23

## 2025-04-17 RX ADMIN — OXYCODONE AND ACETAMINOPHEN 1 TABLET: 325; 5 TABLET ORAL at 15:36

## 2025-04-17 RX ADMIN — OXYCODONE AND ACETAMINOPHEN 1 TABLET: 325; 5 TABLET ORAL at 21:22

## 2025-04-17 RX ADMIN — PIPERACILLIN AND TAZOBACTAM 4500 MG: 4; .5 INJECTION, POWDER, LYOPHILIZED, FOR SOLUTION INTRAVENOUS at 05:42

## 2025-04-17 ASSESSMENT — PAIN SCALES - GENERAL
PAINLEVEL_OUTOF10: 3
PAINLEVEL_OUTOF10: 3
PAINLEVEL_OUTOF10: 6
PAINLEVEL_OUTOF10: 4

## 2025-04-17 ASSESSMENT — PAIN DESCRIPTION - LOCATION
LOCATION: BACK
LOCATION: BACK
LOCATION: THROAT
LOCATION: BACK

## 2025-04-17 ASSESSMENT — PAIN SCALES - WONG BAKER
WONGBAKER_NUMERICALRESPONSE: HURTS EVEN MORE

## 2025-04-17 ASSESSMENT — PAIN DESCRIPTION - PAIN TYPE: TYPE: SURGICAL PAIN;ACUTE PAIN

## 2025-04-17 ASSESSMENT — PAIN DESCRIPTION - DIRECTION: RADIATING_TOWARDS: L SHOULDER

## 2025-04-17 ASSESSMENT — PAIN DESCRIPTION - FREQUENCY: FREQUENCY: CONTINUOUS

## 2025-04-17 ASSESSMENT — PAIN - FUNCTIONAL ASSESSMENT: PAIN_FUNCTIONAL_ASSESSMENT: ACTIVITIES ARE NOT PREVENTED

## 2025-04-17 ASSESSMENT — PAIN DESCRIPTION - ORIENTATION
ORIENTATION: LEFT;UPPER
ORIENTATION: LEFT
ORIENTATION: LEFT

## 2025-04-17 ASSESSMENT — PAIN DESCRIPTION - DESCRIPTORS
DESCRIPTORS: ACHING;DISCOMFORT
DESCRIPTORS: ACHING;DISCOMFORT
DESCRIPTORS: DISCOMFORT;SORE
DESCRIPTORS: ACHING;DISCOMFORT

## 2025-04-17 NOTE — DISCHARGE INSTR - DIET

## 2025-04-17 NOTE — CARE COORDINATION
Chart review done, per pulmonary, - chest tube removal, and monitor. DC tomorrow.    Plan is for 3 weeks of IV or ertapenem at home.  ID following, and patient will come to the outpt infusion center.      Will continue to follow.         Rey Noguera LMSW, [facility] [employer] Work Case Management   Phone: [phone]  Fax: [phone]

## 2025-04-17 NOTE — PROGRESS NOTES
Pulmonary Progress Note    Date of Admission: 4/9/2025   LOS: 8 days     Chief Complaint   Patient presents with    Shortness of Breath     Pt to ed c/o sob and chest pain that started yesterday.        Assessment/Plan:       Empyema, left.  Staph epidermidis  -Status post chest tube, minimal output over the past 24 hours  -Plan to pull chest tube today, chest x-ray stable  - Plan is for 3 weeks of IV or ertapenem at home.  ID following.    Sore throat  - Red, no exudate  - Viral panel  - Supportive care    From pulmonary standpoint okay for discharge today or tomorrow pending outpatient IV antibiotic logistics.      24 Hour Events/Subjective  No acute events overnight.  Chest tube output of 10 to 15 cc.    ROS:   No nausea  No Vomiting  No chest pain      Intake/Output Summary (Last 24 hours) at 4/17/2025 1036  Last data filed at 4/17/2025 0543  Gross per 24 hour   Intake --   Output 50 ml   Net -50 ml           PHYSICAL EXAM:   Blood pressure 138/85, pulse 66, temperature 98.2 °F (36.8 °C), temperature source Oral, resp. rate 16, height 1.524 m (5'), weight 109.8 kg (242 lb), SpO2 97%, not currently breastfeeding.'  Gen:  No acute distress.   Resp:  No crackles. No wheezes. No rhonchi. No dullness on percussion.  CV: Regular rate. Regular rhythm. No murmur or rub. No edema.   Neuro:  no focal neurologic deficit.  Moves all extremities  Psych: Awake and alert  Mood stable.      Labs reviewed:  CBC:   Recent Labs     04/15/25  0422 04/16/25  0530 04/17/25  0540   WBC 7.4 6.2 5.7   HGB 11.4* 11.0* 10.8*   HCT 33.3* 32.1* 30.8*   MCV 92.2 92.0 92.5    327 277     BMP:   Recent Labs     04/15/25  0422 04/16/25  0530 04/17/25  0540    137 138   K 3.7 3.7 3.7    100 100   CO2 26 28 29   BUN 9 5* 6*   CREATININE 0.6 0.6 0.5*     LIVER PROFILE:   No results for input(s): \"AST\", \"ALT\", \"LIPASE\", \"AMYLASE\", \"BILIDIR\", \"BILITOT\", \"ALKPHOS\" in the last 72 hours.    Invalid input(s): \"ALB\"    PT/INR: No

## 2025-04-17 NOTE — PROGRESS NOTES
Hospitalist   Progress Note    Patient Name: Stacie Donato  PCP: Samuel Sy MD  Date of Admission: 4/9/2025    Chief Complaint on Admission: Shortness of breath. Pt to ed c/o sob and chest pain that started yesterday.   Chief diagnosis after evaluation: Empyema.  Recurrent loculated left pleural effusion    Brief Synopsis: Patient is a 44 y.o. woman who has a past medical history of Asthma, Fatty liver, Obesity, Psoriasis, Psoriasis, and Type 2 diabetes mellitus. who was admitted on 4/9/2025 for evaluation and treatment of an Empyema. She has had a recurrent loculated left pleural effusion    Pt Seen/Examined and Chart Reviewed.     Subjective: Pt has no new complaints this morning.     Objective:  Allergies  Patient has no known allergies.    Medications    Scheduled Meds:   piperacillin-tazobactam  4,500 mg IntraVENous Q8H    sodium chloride flush  5-40 mL IntraVENous 2 times per day    enoxaparin  30 mg SubCUTAneous BID    linezolid  600 mg IntraVENous Q12H    piperacillin-tazobactam  4,500 mg IntraVENous Once    insulin glargine  30 Units SubCUTAneous Nightly    lidocaine 1 % injection  50 mg IntraDERmal Once     Infusions:   sodium chloride 25 mL/hr at 04/14/25 1404    dextrose       PRN Meds:  benzocaine-menthol, calcium carbonate, sodium chloride flush, sodium chloride, potassium chloride **OR** potassium alternative oral replacement **OR** potassium chloride, magnesium sulfate, ondansetron **OR** ondansetron, polyethylene glycol, acetaminophen **OR** acetaminophen, famotidine, glucose, dextrose bolus **OR** dextrose bolus, glucagon (rDNA), dextrose, oxyCODONE-acetaminophen, melatonin    Physical    VITALS:  /85   Pulse 66   Temp 98.2 °F (36.8 °C) (Oral)   Resp 16   Ht 1.524 m (5')   Wt 109.8 kg (242 lb)   SpO2 97%   BMI 47.26 kg/m²   CONSTITUTIONAL:  Morbidly Obese 44 y.o. year-old female who is sitting in a chair awake, alert, cooperative, no apparent distress, and appears stated

## 2025-04-17 NOTE — PROGRESS NOTES
Infectious Diseases Inpatient Progress  Note        CHIEF COMPLAINT:     Empyema  Recurrent pleural effusion  Left lower lobe pneumonia  Morbid obesity  WBC elevation  Chest tube in place      HISTORY OF PRESENT ILLNESS:  44 y.o. female with significant history for asthma, fatty liver, obesity BMI 47, psoriasis, diabetes, poor control was recently admitted to [facility] from 3/7/25 to  3/18/25, prolonged hospitalization secondary to pneumonia left pleural effusion exudative effusion requiring thoracentesis on previous admission she was immunosuppressed while taking biologic for her psoriatic arthritis but she has been off treatment since January.  She does have poor dentition.  He was on IV antibiotics with eventually discharged home on oral Augmentin for 3 weeks course for pneumonia and then left exudative pleural effusion.  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  Respiratory culture on previous admission group B strep pneumonia molecular panel group B strep, TB QuantiFERON negative, pleural fluid cultures negative including AFB and fungal.      Patient now readmitted to hospital secondary to ongoing left-sided chest pain associated with sweating fever shortness of breath not feeling well.  On admission labs indicate sodium 135 creatinine 0.9  WBC elevated 15 CT chest indicated left lower lobe effusion with consolidation patient underwent chest tube placement pleural fluid analysis indicating empyema pH at 7, LDH greater than 2500, neutrophils greater than 50,000, 93% neutrophil consistent with empyema pleural fluid culture Gram stain gram-positive cocci in chains resembling streptococci blood culture positive for coagulase-negative staph.  Blood cultures have been negative given the ongoing need for IV antibiotics we are consulted for recommendations      Interval history: Ongoing cough not much sputum chest tube in place left side pain slowly improving chest tube output decreasing CRP

## 2025-04-17 NOTE — PLAN OF CARE
Problem: Chronic Conditions and Co-morbidities  Goal: Patient's chronic conditions and co-morbidity symptoms are monitored and maintained or improved  Outcome: Progressing     Problem: Discharge Planning  Goal: Discharge to home or other facility with appropriate resources  Outcome: Progressing     Problem: Pain  Goal: Verbalizes/displays adequate comfort level or baseline comfort level  Outcome: Progressing     Problem: Respiratory - Adult  Goal: Achieves optimal ventilation and oxygenation  Outcome: Progressing     Problem: Skin/Tissue Integrity - Adult  Goal: Skin integrity remains intact  Outcome: Progressing     Problem: Infection - Adult  Goal: Absence of infection at discharge  Outcome: Progressing     Problem: Nutrition Deficit:  Goal: Optimize nutritional status  Outcome: Progressing  Flowsheets (Taken 4/16/2025 [id number] by Henrique Raymundo RD)  Nutrient intake appropriate for improving, restoring, or maintaining nutritional needs: Recommend appropriate diets, oral nutritional supplements, and vitamin/mineral supplements

## 2025-04-17 NOTE — PROGRESS NOTES
Pt alert and oriented. VSS. Pt ambulates well in the room with chest tube. Medications taken whole no issues. Pt complained of pain on left side. Medication given per protocol. Pt denies further needs. Call light within reach. Bed in low position.     Electronically signed by Althea Jacobson RN on 4/16/2025 at 10:00 PM

## 2025-04-18 VITALS
OXYGEN SATURATION: 97 % | RESPIRATION RATE: 18 BRPM | WEIGHT: 235.01 LBS | SYSTOLIC BLOOD PRESSURE: 138 MMHG | TEMPERATURE: 98.2 F | HEIGHT: 60 IN | DIASTOLIC BLOOD PRESSURE: 87 MMHG | BODY MASS INDEX: 46.14 KG/M2 | HEART RATE: 63 BPM

## 2025-04-18 LAB
ANION GAP SERPL CALCULATED.3IONS-SCNC: 10 MMOL/L (ref 3–16)
BASOPHILS # BLD: 0.1 K/UL (ref 0–0.2)
BASOPHILS NFR BLD: 1.5 %
BUN SERPL-MCNC: 5 MG/DL (ref 7–20)
CALCIUM SERPL-MCNC: 8.8 MG/DL (ref 8.3–10.6)
CHLORIDE SERPL-SCNC: 99 MMOL/L (ref 99–110)
CO2 SERPL-SCNC: 27 MMOL/L (ref 21–32)
CREAT SERPL-MCNC: 0.6 MG/DL (ref 0.6–1.1)
DEPRECATED RDW RBC AUTO: 13.5 % (ref 12.4–15.4)
EOSINOPHIL # BLD: 0.2 K/UL (ref 0–0.6)
EOSINOPHIL NFR BLD: 4.1 %
GFR SERPLBLD CREATININE-BSD FMLA CKD-EPI: >90 ML/MIN/{1.73_M2}
GLUCOSE BLD-MCNC: 135 MG/DL (ref 70–99)
GLUCOSE BLD-MCNC: 152 MG/DL (ref 70–99)
GLUCOSE BLD-MCNC: 155 MG/DL (ref 70–99)
GLUCOSE SERPL-MCNC: 164 MG/DL (ref 70–99)
HCT VFR BLD AUTO: 33.2 % (ref 36–48)
HGB BLD-MCNC: 11.4 G/DL (ref 12–16)
LYMPHOCYTES # BLD: 1.7 K/UL (ref 1–5.1)
LYMPHOCYTES NFR BLD: 28.1 %
MCH RBC QN AUTO: 31.6 PG (ref 26–34)
MCHC RBC AUTO-ENTMCNC: 34.3 G/DL (ref 31–36)
MCV RBC AUTO: 92.3 FL (ref 80–100)
MONOCYTES # BLD: 0.4 K/UL (ref 0–1.3)
MONOCYTES NFR BLD: 6 %
NEUTROPHILS # BLD: 3.6 K/UL (ref 1.7–7.7)
NEUTROPHILS NFR BLD: 60.3 %
PERFORMED ON: ABNORMAL
PLATELET # BLD AUTO: 281 K/UL (ref 135–450)
PMV BLD AUTO: 6.7 FL (ref 5–10.5)
POTASSIUM SERPL-SCNC: 3.8 MMOL/L (ref 3.5–5.1)
RBC # BLD AUTO: 3.6 M/UL (ref 4–5.2)
SODIUM SERPL-SCNC: 136 MMOL/L (ref 136–145)
WBC # BLD AUTO: 5.9 K/UL (ref 4–11)

## 2025-04-18 PROCEDURE — 85025 COMPLETE CBC W/AUTO DIFF WBC: CPT

## 2025-04-18 PROCEDURE — 2580000003 HC RX 258: Performed by: INTERNAL MEDICINE

## 2025-04-18 PROCEDURE — 6370000000 HC RX 637 (ALT 250 FOR IP): Performed by: INTERNAL MEDICINE

## 2025-04-18 PROCEDURE — 99232 SBSQ HOSP IP/OBS MODERATE 35: CPT | Performed by: INTERNAL MEDICINE

## 2025-04-18 PROCEDURE — 6370000000 HC RX 637 (ALT 250 FOR IP)

## 2025-04-18 PROCEDURE — 6360000002 HC RX W HCPCS: Performed by: INTERNAL MEDICINE

## 2025-04-18 PROCEDURE — 80048 BASIC METABOLIC PNL TOTAL CA: CPT

## 2025-04-18 PROCEDURE — 94760 N-INVAS EAR/PLS OXIMETRY 1: CPT

## 2025-04-18 RX ADMIN — PIPERACILLIN AND TAZOBACTAM 4500 MG: 4; .5 INJECTION, POWDER, LYOPHILIZED, FOR SOLUTION INTRAVENOUS at 14:09

## 2025-04-18 RX ADMIN — PIPERACILLIN AND TAZOBACTAM 4500 MG: 4; .5 INJECTION, POWDER, LYOPHILIZED, FOR SOLUTION INTRAVENOUS at 06:15

## 2025-04-18 RX ADMIN — Medication 1 LOZENGE: at 17:29

## 2025-04-18 RX ADMIN — LINEZOLID 600 MG: 600 INJECTION, SOLUTION INTRAVENOUS at 15:55

## 2025-04-18 RX ADMIN — Medication 1 LOZENGE: at 12:52

## 2025-04-18 RX ADMIN — LINEZOLID 600 MG: 600 INJECTION, SOLUTION INTRAVENOUS at 04:50

## 2025-04-18 RX ADMIN — ERTAPENEM SODIUM 1000 MG: 1 INJECTION INTRAMUSCULAR; INTRAVENOUS at 08:24

## 2025-04-18 RX ADMIN — OXYCODONE AND ACETAMINOPHEN 1 TABLET: 325; 5 TABLET ORAL at 06:10

## 2025-04-18 RX ADMIN — OXYCODONE AND ACETAMINOPHEN 1 TABLET: 325; 5 TABLET ORAL at 12:49

## 2025-04-18 ASSESSMENT — PAIN SCALES - WONG BAKER
WONGBAKER_NUMERICALRESPONSE: NO HURT

## 2025-04-18 ASSESSMENT — PAIN SCALES - GENERAL
PAINLEVEL_OUTOF10: 8
PAINLEVEL_OUTOF10: 6

## 2025-04-18 ASSESSMENT — PAIN DESCRIPTION - LOCATION: LOCATION: BACK

## 2025-04-18 ASSESSMENT — PAIN DESCRIPTION - DESCRIPTORS: DESCRIPTORS: ACHING;DISCOMFORT

## 2025-04-18 NOTE — DISCHARGE SUMMARY
Hospitalist Discharge Summary     Stacie Donato  : 1980  MRN: [id number]    Admit date: 2025  Discharge date: 2025    Admitting Physician: Aj Craft MD    Discharge Diagnoses:   Patient Active Problem List   Diagnosis    Pleural effusion on left    Pulmonary nodules    Left-sided chest pain    Immunocompromised    Acute septic pulmonary embolism (HCC)    Shortness of breath    Cavitary lung disease    Poorly controlled diabetes mellitus (HCC)    Morbid obesity with BMI of 45.0-49.9, adult    Smoking    Elevated erythrocyte sedimentation rate    CRP elevated    Abnormal CT of the chest    MRSA colonization    Group B streptococcal infection    Pleurisy with effusion    Pleural effusion    Cavitary pneumonia    Type 2 diabetes mellitus without complication, with long-term current use of insulin    Empyema (HCC)    Hypomagnesemia    Neutrophilia    Chest tube in place    Recurrent left pleural effusion    Peripherally inserted central catheter (PICC) in place    Pneumonia of left lower lobe due to infectious organism       Admission Condition: serious    Discharged Condition: stable    Discharge Exam:  VITALS:  /87   Pulse 63   Temp 98.2 °F (36.8 °C) (Oral)   Resp 18   Ht 1.524 m (5')   Wt 106.6 kg (235 lb 0.2 oz)   SpO2 97%   BMI 45.90 kg/m²   CONSTITUTIONAL:  Morbidly Obese, awake, alert, cooperative, no apparent distress, and appears stated age  EYES:  Lids and lashes normal, PERRL, EOMI, sclera clear, conjunctiva normal  ENT:  NC/AT, MMM    NECK:  Supple, symmetrical, trachea midline, no adenopathy  HEMATOLOGIC/LYMPHATICS:  no cervical, supraclavicular or axillary lymphadenopathy  LUNGS:  clear to auscultation bilaterally, No increased work of breathing, good air exchange, no crackles or wheezing  CARDIOVASCULAR:  Regular rate and rhythm, normal S1 and S2, no S3 or S4, and no significant murmurs, rubs or gallops noted. Normal apical impulse,   ABDOMEN:  Normal active bowel  left empyema and a 0.035 guidewire was used to place a 10 Tunisian chest tube after the fascial tract was dilated.  The catheter was sutured to the skin and the patient tolerated the procedure well.  The catheter was attached to Pleurevac drainage. FINDINGS: Post procedure images demonstrate the chest tube in good position     Unsuccessful attempt at placing the chest tube into the left empyema using ultrasound guidance. Successful CT guided placement of a chest tube in the left lung empyema     US THORACENTESIS Which side should the procedure be performed? Radiologist Recommendation  Result Date: 4/10/2025  PROCEDURE: US and CT GUIDED CHEST TUBE PLACEMENT 4/10/2025 HISTORY: ORDERING SYSTEM PROVIDED HISTORY: Left Empyema SEDATION: Local anesthesia TECHNIQUE: Informed consent was obtained after a detailed explanation of the procedure including risks, benefits, and alternatives.  Universal protocol was followed. Using ultrasound guidance, the fluid collection was identified and noted to be loculated.  Skin was anesthetized with 1% lidocaine.  A 5 Tunisian Yueh centesis needle was advanced into the fluid under ultrasound guidance a small amount of fluid was able to be aspirated.  A wire was advanced but became stuck in the small area of loculation.  The needle was removed and a 10 Tunisian catheter was advanced over the wire and attempt to advance into the empyema however this was unsuccessful. The patient was then moved to the CT suite and placed in right lateral decubitus position.  Suitable skin site was prepped and draped in sterile fashion following CT localization. An 5 Tunisian Yueh centesis needle was advanced into the left empyema and a 0.035 guidewire was used to place a 10 Tunisian chest tube after the fascial tract was dilated.  The catheter was sutured to the skin and the patient tolerated the procedure well.  The catheter was attached to Pleurevac drainage. FINDINGS: Post procedure images demonstrate the chest tube

## 2025-04-18 NOTE — PROGRESS NOTES
Discharge instructions and education given to patient. Meds and follow-up appointment instructions given. Fluids stopped. Tele removed from patient. In stable condition, VSS. Pt discharged to their personal car. All questions addressed. Pt discharged.

## 2025-04-18 NOTE — CARE COORDINATION
CASE MANAGEMENT DISCHARGE SUMMARY:    DISCHARGE DATE: 4/18/25    DISCHARGED TO HOME     Will go to outpatient infusion center for IVABs    TRANSPORTATION: family Rey Noguera LMSW, [facility] Work Case Management   Phone: [phone]  Fax: [phone]   Electronically signed by SHOAIB Washington on 4/18/2025 at 11:01 AM

## 2025-04-18 NOTE — PROGRESS NOTES
Pulmonary Progress Note    Date of Admission: 4/9/2025   LOS: 9 days     Chief Complaint   Patient presents with    Shortness of Breath     Pt to ed c/o sob and chest pain that started yesterday.        Assessment/Plan:       Empyema, left.  Staph epidermidis  -Chest tube removed yesterday    - Plan is for 3 weeks of IV ertapenem at home.  ID following.    Sore throat  - Supportive care    From pulmonary standpoint okay for discharge today.  She has picc.No further inpatient recommendations, we will sign off at this time.  Please let us know if we can be of any further assistance.     Future Appointments   Date Time Provider Department Center   4/29/2025  9:30 AM Samuel Sy MD [facility]   5/14/2025 11:20 AM [facility]   5/20/2025 10:15 AM SCHEDULE, [facility] SCHEDULE [facility]           24 Hour Events/Subjective  No acute events overnight.  Chest tube removed yesterday.    ROS:   No nausea  No Vomiting  No chest pain      Intake/Output Summary (Last 24 hours) at 4/18/2025 0923  Last data filed at 4/17/2025 2021  Gross per 24 hour   Intake 240 ml   Output --   Net 240 ml           PHYSICAL EXAM:   Blood pressure 138/87, pulse 63, temperature 98.2 °F (36.8 °C), temperature source Oral, resp. rate 18, height 1.524 m (5'), weight 106.6 kg (235 lb 0.2 oz), SpO2 97%, not currently breastfeeding.'  Gen:  No acute distress.   Resp:  No crackles. No wheezes. No rhonchi. No dullness on percussion.  CV: Regular rate. Regular rhythm. No murmur or rub. No edema.   Neuro:  no focal neurologic deficit.  Moves all extremities  Psych: Awake and alert  Mood stable.      Labs reviewed:  CBC:   Recent Labs     04/16/25 0530 04/17/25  0540 04/18/25  0620   WBC 6.2 5.7 5.9   HGB 11.0* 10.8* 11.4*   HCT 32.1* 30.8* 33.2*   MCV 92.0 92.5 92.3    277 281     BMP:   Recent Labs     04/16/25 0530 04/17/25  0540 04/18/25  0620    138 136   K 3.7 3.7 3.8    100 99   CO2 28 29 27

## 2025-04-18 NOTE — PLAN OF CARE
Problem: Chronic Conditions and Co-morbidities  Goal: Patient's chronic conditions and co-morbidity symptoms are monitored and maintained or improved  4/18/2025 1216 by Karla Silva RN  Outcome: Progressing  4/18/2025 0246 by Ewa Zabala RN  Outcome: Progressing     Problem: Discharge Planning  Goal: Discharge to home or other facility with appropriate resources  4/18/2025 1216 by Karla Silva RN  Outcome: Progressing  4/18/2025 0246 by Ewa Zabala RN  Outcome: Progressing     Problem: Pain  Goal: Verbalizes/displays adequate comfort level or baseline comfort level  4/18/2025 1216 by Karla Silva RN  Outcome: Progressing  4/18/2025 0246 by Ewa Zabala RN  Outcome: Progressing     Problem: Respiratory - Adult  Goal: Achieves optimal ventilation and oxygenation  4/18/2025 1216 by Karla Silva RN  Outcome: Progressing  4/18/2025 0246 by Ewa Zabala RN  Outcome: Progressing     Problem: Skin/Tissue Integrity - Adult  Goal: Skin integrity remains intact  4/18/2025 1216 by Karla Silva RN  Outcome: Progressing  4/18/2025 0246 by Ewa Zabala RN  Outcome: Progressing     Problem: Infection - Adult  Goal: Absence of infection at discharge  4/18/2025 1216 by Karla Silva RN  Outcome: Progressing  4/18/2025 0246 by Ewa Zabala RN  Outcome: Progressing     Problem: Nutrition Deficit:  Goal: Optimize nutritional status  4/18/2025 1216 by Karla Silva RN  Outcome: Progressing  4/18/2025 0246 by Ewa Zabala RN  Outcome: Progressing

## 2025-04-18 NOTE — PROGRESS NOTES
Infectious Diseases Inpatient Progress  Note        CHIEF COMPLAINT:     Empyema  Recurrent pleural effusion  Left lower lobe pneumonia  Morbid obesity  WBC elevation  Chest tube in place      HISTORY OF PRESENT ILLNESS:  44 y.o. female with significant history for asthma, fatty liver, obesity BMI 47, psoriasis, diabetes, poor control was recently admitted to [facility] from 3/7/25 to  3/18/25, prolonged hospitalization secondary to pneumonia left pleural effusion exudative effusion requiring thoracentesis on previous admission she was immunosuppressed while taking biologic for her psoriatic arthritis but she has been off treatment since January.  She does have poor dentition.  He was on IV antibiotics with eventually discharged home on oral Augmentin for 3 weeks course for pneumonia and then left exudative pleural effusion.  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  Respiratory culture on previous admission group B strep pneumonia molecular panel group B strep, TB QuantiFERON negative, pleural fluid cultures negative including AFB and fungal.      Patient now readmitted to hospital secondary to ongoing left-sided chest pain associated with sweating fever shortness of breath not feeling well.  On admission labs indicate sodium 135 creatinine 0.9  WBC elevated 15 CT chest indicated left lower lobe effusion with consolidation patient underwent chest tube placement pleural fluid analysis indicating empyema pH at 7, LDH greater than 2500, neutrophils greater than 50,000, 93% neutrophil consistent with empyema pleural fluid culture Gram stain gram-positive cocci in chains resembling streptococci blood culture positive for coagulase-negative staph.  Blood cultures have been negative given the ongoing need for IV antibiotics we are consulted for recommendations      Interval history: No fever no chills respiratory status slowly improving chest tube removed by interventional radiology yesterday  Status: CompletedSpecimen: Sputum ExpectoratedUpdated: 03/13/25 1042CULTURE, RESPIRATORYModerate growth normal respiratory judy with Abnormal Gram Stain Result3+ Epithelial Cells present  4+ WBC's (Polymorphonuclear) present  4+ WBC's (Mononuclear) present  3+ Gram variable rods present  2+ Gram positive cocci  OrganismStrep agalactiae (Beta Strep Group B) Abnormal CULTURE, RESPIRATORY--Light growth  Susceptibility testing of penicillin and other beta lactams is  not necessary for beta hemolytic Streptococci since resistant  strains have not been identified. (CLSI M100)  Narrative:  ORDER#: [id number]                          ORDERED BY: ELSIE MANJARREZ  SOURCE: Sputum Expectorated                COLLECTED:  03/10/25 18:16  ANTIBIOTICS AT JERROD.:                      RECEIVED :  03/10/25 18:20Pneumonia Panel, Molecular [[id number]] (Abnormal)Collected: 03/10/25 1816Order Status: CompletedSpecimen: Sputum ExpectoratedUpdated: 03/10/25 2303OrganismStreptococcus agalactiae (BHS Gr B) DNA Detected Abnormal Pneumonia Panel Molecular--100,000 copies/mL  See additional report for complete Pneumonia panel.  Susceptibility testing of penicillin and other beta lactams is  not necessary for beta hemolytic Streptococci since resistant  strains have not been identified. (CLSI M100)  Narrative:  ORDER#: [id number]                          ORDERED BY: ELSIE MANJARREZ  SOURCE: Sputum Expectorated                COLLECTED:  03/10/25 18:16  ANTIBIOTICS AT JERROD.:                      RECEIVED :  03/10/25 18:20PNEUMONIA PANEL FILM ARRAY REPORT [[id number]]Collected: 03/10/25 1816Order Status: CompletedUpdated: 03/10/25 2305ReportSEE IMAGEQuantiferon TB Gold [[id number]]Collected: 03/09/25 1636Order Status: CompletedSpecimen: BloodUpdated: 03/11/25 1525Quantiferon TB Gold PlusNegative  Comment: INTERPRETIVE INFORMATION:Quantiferon TB Gold Plus  Interferon gamma release is measured for specimens from each of the  four  collection

## 2025-04-19 ENCOUNTER — HOSPITAL ENCOUNTER (OUTPATIENT)
Dept: INFUSION THERAPY | Age: 45
Setting detail: INFUSION SERIES
Discharge: HOME | End: 2025-04-19
Payer: COMMERCIAL

## 2025-04-19 VITALS
SYSTOLIC BLOOD PRESSURE: 138 MMHG | OXYGEN SATURATION: 97 % | HEART RATE: 73 BPM | RESPIRATION RATE: 17 BRPM | DIASTOLIC BLOOD PRESSURE: 95 MMHG | TEMPERATURE: 98.1 F

## 2025-04-19 DIAGNOSIS — J90 PLEURAL EFFUSION ON LEFT: ICD-10-CM

## 2025-04-19 DIAGNOSIS — J86.9 EMPYEMA (HCC): Primary | ICD-10-CM

## 2025-04-19 DIAGNOSIS — J90 RECURRENT LEFT PLEURAL EFFUSION: ICD-10-CM

## 2025-04-19 PROCEDURE — 96374 THER/PROPH/DIAG INJ IV PUSH: CPT

## 2025-04-19 PROCEDURE — 2500000003 HC RX 250 WO HCPCS: Performed by: INTERNAL MEDICINE

## 2025-04-19 PROCEDURE — 2580000003 HC RX 258: Performed by: INTERNAL MEDICINE

## 2025-04-19 PROCEDURE — 6360000002 HC RX W HCPCS: Performed by: INTERNAL MEDICINE

## 2025-04-19 RX ORDER — SODIUM CHLORIDE 9 MG/ML
INJECTION, SOLUTION INTRAVENOUS CONTINUOUS
Status: CANCELLED | OUTPATIENT
Start: 2025-04-20

## 2025-04-19 RX ORDER — HYDROCORTISONE SODIUM SUCCINATE 100 MG/2ML
100 INJECTION INTRAMUSCULAR; INTRAVENOUS
Status: CANCELLED | OUTPATIENT
Start: 2025-04-20

## 2025-04-19 RX ORDER — SODIUM CHLORIDE 0.9 % (FLUSH) 0.9 %
5-40 SYRINGE (ML) INJECTION PRN
Status: DISCONTINUED | OUTPATIENT
Start: 2025-04-19 | End: 2025-04-20 | Stop reason: HOSPADM

## 2025-04-19 RX ORDER — EPINEPHRINE 1 MG/ML
0.3 INJECTION, SOLUTION, CONCENTRATE INTRAVENOUS PRN
Status: CANCELLED | OUTPATIENT
Start: 2025-04-20

## 2025-04-19 RX ORDER — HEPARIN 100 UNIT/ML
500 SYRINGE INTRAVENOUS PRN
Status: CANCELLED | OUTPATIENT
Start: 2025-04-20

## 2025-04-19 RX ORDER — SODIUM CHLORIDE 0.9 % (FLUSH) 0.9 %
5-40 SYRINGE (ML) INJECTION PRN
Status: CANCELLED | OUTPATIENT
Start: 2025-04-20

## 2025-04-19 RX ORDER — ONDANSETRON 2 MG/ML
8 INJECTION INTRAMUSCULAR; INTRAVENOUS
Status: CANCELLED | OUTPATIENT
Start: 2025-04-20

## 2025-04-19 RX ORDER — ACETAMINOPHEN 325 MG/1
650 TABLET ORAL
Status: CANCELLED | OUTPATIENT
Start: 2025-04-20

## 2025-04-19 RX ORDER — DIPHENHYDRAMINE HYDROCHLORIDE 50 MG/ML
50 INJECTION, SOLUTION INTRAMUSCULAR; INTRAVENOUS
Status: CANCELLED | OUTPATIENT
Start: 2025-04-20

## 2025-04-19 RX ORDER — SODIUM CHLORIDE 9 MG/ML
5-250 INJECTION, SOLUTION INTRAVENOUS PRN
Status: CANCELLED | OUTPATIENT
Start: 2025-04-20

## 2025-04-19 RX ORDER — ALBUTEROL SULFATE 90 UG/1
4 INHALANT RESPIRATORY (INHALATION) PRN
Status: CANCELLED | OUTPATIENT
Start: 2025-04-20

## 2025-04-19 RX ADMIN — SODIUM CHLORIDE, PRESERVATIVE FREE 20 ML: 5 INJECTION INTRAVENOUS at 11:46

## 2025-04-19 RX ADMIN — SODIUM CHLORIDE, PRESERVATIVE FREE 10 ML: 5 INJECTION INTRAVENOUS at 11:34

## 2025-04-19 RX ADMIN — SODIUM CHLORIDE 1000 MG: 9 INJECTION INTRAMUSCULAR; INTRAVENOUS; SUBCUTANEOUS at 11:35

## 2025-04-19 RX ADMIN — SODIUM CHLORIDE, PRESERVATIVE FREE 20 ML: 5 INJECTION INTRAVENOUS at 11:47

## 2025-04-19 NOTE — PROGRESS NOTES
Outpatient Infusion Center  [facility]    IV Antibiotic Visit    NAME:  Stacie Donato  YOB: 1980  MEDICAL RECORD NUMBER:  [id number]  DATE:  4/19/2025    Patient arrived to Outpatient Infusion Center   [] per wheelchair   [x] ambulatory     Itching: No  Rash: No  Mouth Sores: No  Nausea: No  Vomiting: No  Diarrhea: No  Night Sweats: No  Fever: No    Administered: [] Peripheral access    [x] PICC access    [] Port access    No Known Allergies    Name of Antibiotic Administered: Invanz  Dose of Antibiotic Administered: 1000 mg.    Indication for Antibiotic: Empyema and pleural effusion on the left side. Had a chest tube and it was removed a couple days ago. No respiratory difficulty now.      Response to treatment:  Well tolerated by patient.       Scheduled to return for next antibiotic dose tomorrow     Electronically signed by Frances Arellano RN on 4/19/2025 at 12:03 PM

## 2025-04-19 NOTE — DISCHARGE INSTRUCTIONS
Outpatient Infusion Discharge Instructions  [facility]  [address] [place], [place] [address]  Telephone: [phone]      FAX [phone]    NAME:  Stacie Donato  YOB: 1980  MEDICAL RECORD NUMBER:  [id number]  DATE:  4/19/25    Reason for Outpatient Infusion Visit: Daily IV Invanz    If you develop any these symptoms please contact you Doctor    [x] Nausea and/or vomiting not relieved with medication   [x] Swelling, redness, and/or bleeding at injection or IV site    [x] Fever or chills  [x] Rash or itching   [x] Shortness of breath  [x] Please review After Visit Summary (AVS) information on    [] Other      Outpatient Infusion Center Information: Should you experience any significant changes in your health or have questions about your care please contact the [facility] at [phone] MONDAY - FRIDAY 8:00 am - 4:00 pm.  If you need help outside these hours and cannot wait until we are again available, contact your Primary Care Physician or go to the hospital emergency room.       Electronically signed by Frances Arellano RN on 4/19/2025 at 11:52 AM

## 2025-04-20 ENCOUNTER — HOSPITAL ENCOUNTER (OUTPATIENT)
Dept: INFUSION THERAPY | Age: 45
Setting detail: INFUSION SERIES
Discharge: HOME | End: 2025-04-20
Payer: COMMERCIAL

## 2025-04-20 VITALS
SYSTOLIC BLOOD PRESSURE: 133 MMHG | HEART RATE: 88 BPM | DIASTOLIC BLOOD PRESSURE: 93 MMHG | RESPIRATION RATE: 18 BRPM | OXYGEN SATURATION: 97 %

## 2025-04-20 DIAGNOSIS — J90 PLEURAL EFFUSION ON LEFT: ICD-10-CM

## 2025-04-20 DIAGNOSIS — J90 RECURRENT LEFT PLEURAL EFFUSION: ICD-10-CM

## 2025-04-20 DIAGNOSIS — J86.9 EMPYEMA (HCC): Primary | ICD-10-CM

## 2025-04-20 PROCEDURE — 6360000002 HC RX W HCPCS: Performed by: INTERNAL MEDICINE

## 2025-04-20 PROCEDURE — 2580000003 HC RX 258: Performed by: INTERNAL MEDICINE

## 2025-04-20 PROCEDURE — 2500000003 HC RX 250 WO HCPCS: Performed by: INTERNAL MEDICINE

## 2025-04-20 PROCEDURE — 96365 THER/PROPH/DIAG IV INF INIT: CPT

## 2025-04-20 RX ORDER — SODIUM CHLORIDE 9 MG/ML
INJECTION, SOLUTION INTRAVENOUS CONTINUOUS
Status: CANCELLED | OUTPATIENT
Start: 2025-04-21

## 2025-04-20 RX ORDER — ALBUTEROL SULFATE 90 UG/1
4 INHALANT RESPIRATORY (INHALATION) PRN
Status: CANCELLED | OUTPATIENT
Start: 2025-04-21

## 2025-04-20 RX ORDER — SODIUM CHLORIDE 0.9 % (FLUSH) 0.9 %
5-40 SYRINGE (ML) INJECTION PRN
Status: DISCONTINUED | OUTPATIENT
Start: 2025-04-20 | End: 2025-04-21 | Stop reason: HOSPADM

## 2025-04-20 RX ORDER — HYDROCORTISONE SODIUM SUCCINATE 100 MG/2ML
100 INJECTION INTRAMUSCULAR; INTRAVENOUS
Status: CANCELLED | OUTPATIENT
Start: 2025-04-21

## 2025-04-20 RX ORDER — ACETAMINOPHEN 325 MG/1
650 TABLET ORAL
Status: CANCELLED | OUTPATIENT
Start: 2025-04-21

## 2025-04-20 RX ORDER — HEPARIN 100 UNIT/ML
500 SYRINGE INTRAVENOUS PRN
Status: CANCELLED | OUTPATIENT
Start: 2025-04-21

## 2025-04-20 RX ORDER — EPINEPHRINE 1 MG/ML
0.3 INJECTION, SOLUTION, CONCENTRATE INTRAVENOUS PRN
Status: CANCELLED | OUTPATIENT
Start: 2025-04-21

## 2025-04-20 RX ORDER — SODIUM CHLORIDE 0.9 % (FLUSH) 0.9 %
5-40 SYRINGE (ML) INJECTION PRN
Status: CANCELLED | OUTPATIENT
Start: 2025-04-21

## 2025-04-20 RX ORDER — DIPHENHYDRAMINE HYDROCHLORIDE 50 MG/ML
50 INJECTION, SOLUTION INTRAMUSCULAR; INTRAVENOUS
Status: CANCELLED | OUTPATIENT
Start: 2025-04-21

## 2025-04-20 RX ORDER — SODIUM CHLORIDE 9 MG/ML
5-250 INJECTION, SOLUTION INTRAVENOUS PRN
Status: CANCELLED | OUTPATIENT
Start: 2025-04-21

## 2025-04-20 RX ORDER — ONDANSETRON 2 MG/ML
8 INJECTION INTRAMUSCULAR; INTRAVENOUS
Status: CANCELLED | OUTPATIENT
Start: 2025-04-21

## 2025-04-20 RX ADMIN — SODIUM CHLORIDE, PRESERVATIVE FREE 60 ML: 5 INJECTION INTRAVENOUS at 10:40

## 2025-04-20 RX ADMIN — SODIUM CHLORIDE 1000 MG: 9 INJECTION INTRAMUSCULAR; INTRAVENOUS; SUBCUTANEOUS at 10:20

## 2025-04-20 NOTE — PROGRESS NOTES
Physician Progress Note      PATIENT:               NICHOLAS COATES  CSN #:                  [id number]  :                       1980  ADMIT DATE:       3/7/2025 10:17 AM  DISCH DATE:        3/18/2025 4:41 PM  RESPONDING  PROVIDER #:        David Dasilva DO          QUERY TEXT:    Pneumonia is documented in the medical record 3/18 Progress Note. Please   specify the type of pneumonia and the causative organism:    The clinical indicators include:  - Left side pneumonia (Progress Note 3/18)  - Pneumonia molecular panel group B strep noted (Progress note 3/18)  - Pneumonia panel and respiratory culture were positive for strep agalactiae,   MRSA nares was also positive  - Initially on IV linezolid and IV Zosyn which was later transitioned to oral   linezolid and oral Augmentin to complete an additional 10-day course   (Discharge Summary 3/18)  Options provided:  -- Pneumonia due to streptococcus agalactiae  -- MRSA pneumonia  -- Other - I will add my own diagnosis  -- Disagree - Not applicable / Not valid  -- Disagree - Clinically unable to determine / Unknown  -- Refer to Clinical Documentation Reviewer    PROVIDER RESPONSE TEXT:    This patient has MRSA pneumonia.    Query created by: Bernie Medel on 2025 5:49 PM      Electronically signed by:  David Dasilva DO 2025 9:18 AM

## 2025-04-20 NOTE — PROGRESS NOTES
Outpatient Infusion Center  [facility]    IV Antibiotic Visit    NAME:  Stacie Donato  YOB: 1980  MEDICAL RECORD NUMBER:  [id number]  DATE:  4/20/2025    Patient arrived to Outpatient Infusion Center   [] per wheelchair   [x] ambulatory     Patient hospitalized from 4/9/25-4/18/25 for left pleural effusion, empyema, and is on daily Invanz for 3 weeks.     Itching: No  Rash: No  Mouth Sores: No  Nausea: No  Vomiting: No  Diarrhea: Yes a little just starting. Instructed to take a probiotic every day and/or eat yogurt every day.  Night Sweats: No  Fever: No    Administered: [] Peripheral access    [x] PICC access    [] Port access    No Known Allergies    Name of Antibiotic Administered: Invanz  Dose of Antibiotic Administered: 1 gram.  Indication for Antibiotic: Empyema left lung.      Response to treatment:  Well tolerated by patient.       Scheduled to return for next antibiotic dose tomorrow     Electronically signed by Josseline Buckner RN on 4/20/2025 at 11:04 AM

## 2025-04-21 ENCOUNTER — TELEPHONE (OUTPATIENT)
Dept: INFECTIOUS DISEASES | Age: 45
End: 2025-04-21

## 2025-04-21 ENCOUNTER — HOSPITAL ENCOUNTER (OUTPATIENT)
Dept: INFUSION THERAPY | Age: 45
Setting detail: INFUSION SERIES
Discharge: HOME | End: 2025-04-21
Payer: COMMERCIAL

## 2025-04-21 ENCOUNTER — PATIENT MESSAGE (OUTPATIENT)
Dept: PRIMARY CARE CLINIC | Age: 45
End: 2025-04-21

## 2025-04-21 VITALS
OXYGEN SATURATION: 97 % | RESPIRATION RATE: 18 BRPM | DIASTOLIC BLOOD PRESSURE: 96 MMHG | HEART RATE: 73 BPM | TEMPERATURE: 98.1 F | SYSTOLIC BLOOD PRESSURE: 141 MMHG

## 2025-04-21 DIAGNOSIS — J90 RECURRENT LEFT PLEURAL EFFUSION: ICD-10-CM

## 2025-04-21 DIAGNOSIS — J90 PLEURAL EFFUSION ON LEFT: ICD-10-CM

## 2025-04-21 DIAGNOSIS — J86.9 EMPYEMA (HCC): Primary | ICD-10-CM

## 2025-04-21 LAB
FUNGUS SPEC CULT: NORMAL
LOEFFLER MB STN SPEC: NORMAL

## 2025-04-21 PROCEDURE — 6360000002 HC RX W HCPCS: Performed by: INTERNAL MEDICINE

## 2025-04-21 PROCEDURE — 96365 THER/PROPH/DIAG IV INF INIT: CPT

## 2025-04-21 PROCEDURE — 2580000003 HC RX 258: Performed by: INTERNAL MEDICINE

## 2025-04-21 PROCEDURE — 2500000003 HC RX 250 WO HCPCS: Performed by: INTERNAL MEDICINE

## 2025-04-21 RX ORDER — SODIUM CHLORIDE 9 MG/ML
INJECTION, SOLUTION INTRAVENOUS CONTINUOUS
Status: CANCELLED | OUTPATIENT
Start: 2025-04-22

## 2025-04-21 RX ORDER — SODIUM CHLORIDE 0.9 % (FLUSH) 0.9 %
5-40 SYRINGE (ML) INJECTION PRN
Status: CANCELLED | OUTPATIENT
Start: 2025-04-22

## 2025-04-21 RX ORDER — HYDROCORTISONE SODIUM SUCCINATE 100 MG/2ML
100 INJECTION INTRAMUSCULAR; INTRAVENOUS
Status: CANCELLED | OUTPATIENT
Start: 2025-04-22

## 2025-04-21 RX ORDER — DIPHENHYDRAMINE HYDROCHLORIDE 50 MG/ML
50 INJECTION, SOLUTION INTRAMUSCULAR; INTRAVENOUS
Status: CANCELLED | OUTPATIENT
Start: 2025-04-22

## 2025-04-21 RX ORDER — SODIUM CHLORIDE 0.9 % (FLUSH) 0.9 %
5-40 SYRINGE (ML) INJECTION PRN
Status: DISCONTINUED | OUTPATIENT
Start: 2025-04-21 | End: 2025-04-22 | Stop reason: HOSPADM

## 2025-04-21 RX ORDER — ACETAMINOPHEN 325 MG/1
650 TABLET ORAL
Status: CANCELLED | OUTPATIENT
Start: 2025-04-22

## 2025-04-21 RX ORDER — EPINEPHRINE 1 MG/ML
0.3 INJECTION, SOLUTION, CONCENTRATE INTRAVENOUS PRN
Status: CANCELLED | OUTPATIENT
Start: 2025-04-22

## 2025-04-21 RX ORDER — ALBUTEROL SULFATE 90 UG/1
4 INHALANT RESPIRATORY (INHALATION) PRN
Status: CANCELLED | OUTPATIENT
Start: 2025-04-22

## 2025-04-21 RX ORDER — HEPARIN 100 UNIT/ML
500 SYRINGE INTRAVENOUS PRN
Status: CANCELLED | OUTPATIENT
Start: 2025-04-22

## 2025-04-21 RX ORDER — ONDANSETRON 2 MG/ML
8 INJECTION INTRAMUSCULAR; INTRAVENOUS
Status: CANCELLED | OUTPATIENT
Start: 2025-04-22

## 2025-04-21 RX ORDER — SODIUM CHLORIDE 9 MG/ML
5-250 INJECTION, SOLUTION INTRAVENOUS PRN
Status: CANCELLED | OUTPATIENT
Start: 2025-04-22

## 2025-04-21 RX ADMIN — SODIUM CHLORIDE 1000 MG: 9 INJECTION INTRAMUSCULAR; INTRAVENOUS; SUBCUTANEOUS at 11:38

## 2025-04-21 RX ADMIN — SODIUM CHLORIDE, PRESERVATIVE FREE 60 ML: 5 INJECTION INTRAVENOUS at 12:01

## 2025-04-21 NOTE — PROGRESS NOTES
Outpatient Infusion Center  [facility]    IV Antibiotic Visit    NAME:  Stacie Donato  YOB: 1980  MEDICAL RECORD NUMBER:  [id number]  DATE:  4/21/2025    Patient arrived to Outpatient Infusion Center   [] per wheelchair   [x] ambulatory     Patient hospitalized from 4/9/25-4/18/25 for left pleural effusion, empyema, and is on daily Invanz for 3 weeks. 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     Itching: No  Rash: No  Mouth Sores: No  Nausea: No  Vomiting: No  Diarrhea: No  Night Sweats: No  Fever: No    Administered: [] Peripheral access    [x] PICC access    [] Port access    Patient has a double lumen PICC line and today purple lumen was clogged up but after working with it finally able to flush with 20 ml NS. Yesterday the red lumen was clogged up and finally opened up with flushing attempts.     No Known Allergies    Name of Antibiotic Administered: Invanz  Dose of Antibiotic Administered: 1 gram     Indication for Antibiotic: pleural effusion, empyema      Response to treatment:  Well tolerated by patient.     End date for daily ABX is 5/9/25 currently. Patient needs to get a CT scan of chest prior to last day.    Scheduled to return for next antibiotic dose tomorrow     Electronically signed by Josseline Buckner RN on 4/21/2025 at 11:44 AM

## 2025-04-22 ENCOUNTER — TELEPHONE (OUTPATIENT)
Dept: INFECTIOUS DISEASES | Age: 45
End: 2025-04-22

## 2025-04-22 ENCOUNTER — HOSPITAL ENCOUNTER (OUTPATIENT)
Dept: INFUSION THERAPY | Age: 45
Setting detail: INFUSION SERIES
Discharge: HOME | End: 2025-04-22
Payer: COMMERCIAL

## 2025-04-22 VITALS
TEMPERATURE: 97.9 F | DIASTOLIC BLOOD PRESSURE: 87 MMHG | HEART RATE: 70 BPM | RESPIRATION RATE: 18 BRPM | OXYGEN SATURATION: 97 % | SYSTOLIC BLOOD PRESSURE: 159 MMHG

## 2025-04-22 DIAGNOSIS — J90 RECURRENT LEFT PLEURAL EFFUSION: ICD-10-CM

## 2025-04-22 DIAGNOSIS — J90 PLEURAL EFFUSION ON LEFT: ICD-10-CM

## 2025-04-22 DIAGNOSIS — J86.9 EMPYEMA (HCC): Primary | ICD-10-CM

## 2025-04-22 LAB
ACID FAST STN SPEC QL: NORMAL
MYCOBACTERIUM SPEC CULT: NORMAL

## 2025-04-22 PROCEDURE — 2500000003 HC RX 250 WO HCPCS: Performed by: INTERNAL MEDICINE

## 2025-04-22 PROCEDURE — 2580000003 HC RX 258: Performed by: INTERNAL MEDICINE

## 2025-04-22 PROCEDURE — 6360000002 HC RX W HCPCS: Performed by: INTERNAL MEDICINE

## 2025-04-22 PROCEDURE — 96365 THER/PROPH/DIAG IV INF INIT: CPT

## 2025-04-22 RX ORDER — ALBUTEROL SULFATE 90 UG/1
4 INHALANT RESPIRATORY (INHALATION) PRN
Status: CANCELLED | OUTPATIENT
Start: 2025-04-23

## 2025-04-22 RX ORDER — SODIUM CHLORIDE 9 MG/ML
5-250 INJECTION, SOLUTION INTRAVENOUS PRN
Status: CANCELLED | OUTPATIENT
Start: 2025-04-23

## 2025-04-22 RX ORDER — SODIUM CHLORIDE 9 MG/ML
INJECTION, SOLUTION INTRAVENOUS CONTINUOUS
Status: CANCELLED | OUTPATIENT
Start: 2025-04-23

## 2025-04-22 RX ORDER — DIPHENHYDRAMINE HYDROCHLORIDE 50 MG/ML
50 INJECTION, SOLUTION INTRAMUSCULAR; INTRAVENOUS
Status: CANCELLED | OUTPATIENT
Start: 2025-04-23

## 2025-04-22 RX ORDER — EPINEPHRINE 1 MG/ML
0.3 INJECTION, SOLUTION, CONCENTRATE INTRAVENOUS PRN
Status: CANCELLED | OUTPATIENT
Start: 2025-04-23

## 2025-04-22 RX ORDER — HYDROCORTISONE SODIUM SUCCINATE 100 MG/2ML
100 INJECTION INTRAMUSCULAR; INTRAVENOUS
Status: CANCELLED | OUTPATIENT
Start: 2025-04-23

## 2025-04-22 RX ORDER — HEPARIN 100 UNIT/ML
500 SYRINGE INTRAVENOUS PRN
Status: CANCELLED | OUTPATIENT
Start: 2025-04-23

## 2025-04-22 RX ORDER — SODIUM CHLORIDE 0.9 % (FLUSH) 0.9 %
5-40 SYRINGE (ML) INJECTION PRN
Status: CANCELLED | OUTPATIENT
Start: 2025-04-23

## 2025-04-22 RX ORDER — ACETAMINOPHEN 325 MG/1
650 TABLET ORAL
Status: CANCELLED | OUTPATIENT
Start: 2025-04-23

## 2025-04-22 RX ORDER — SODIUM CHLORIDE 0.9 % (FLUSH) 0.9 %
5-40 SYRINGE (ML) INJECTION PRN
Status: DISCONTINUED | OUTPATIENT
Start: 2025-04-22 | End: 2025-04-23 | Stop reason: HOSPADM

## 2025-04-22 RX ORDER — ONDANSETRON 2 MG/ML
8 INJECTION INTRAMUSCULAR; INTRAVENOUS
Status: CANCELLED | OUTPATIENT
Start: 2025-04-23

## 2025-04-22 RX ADMIN — SODIUM CHLORIDE 1000 MG: 9 INJECTION INTRAMUSCULAR; INTRAVENOUS; SUBCUTANEOUS at 11:05

## 2025-04-22 RX ADMIN — WATER 2 MG: 1 INJECTION INTRAMUSCULAR; INTRAVENOUS; SUBCUTANEOUS at 11:30

## 2025-04-22 RX ADMIN — SODIUM CHLORIDE, PRESERVATIVE FREE 70 ML: 5 INJECTION INTRAVENOUS at 11:30

## 2025-04-22 NOTE — PROGRESS NOTES
Outpatient Infusion Center  [facility]    IV Antibiotic Visit    NAME:  Stacie Donato  YOB: 1980  MEDICAL RECORD NUMBER:  [id number]  DATE:  4/22/2025    Patient arrived to Outpatient Infusion Center   [] per wheelchair   [x] ambulatory     Patient hospitalized from 4/9/25-4/18/25 for left pleural effusion, empyema, and is on daily Invanz for 3 weeks. 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     Itching: No  Rash: No  Mouth Sores: No  Nausea: No  Vomiting: No  Diarrhea: No  Night Sweats: No  Fever: No    Administered: [] Peripheral access    [x] PICC access    [] Port access    No Known Allergies    Name of Antibiotic Administered: Invanz  Dose of Antibiotic Administered: 1 gram    Indication for Antibiotic: pleural effusion empyema      Response to treatment:  Well tolerated by patient.     PICC lumens flushed better today. Purple lumen more sluggish than red lumen. Both with blood return. Instilled 1.1 ml Cathflo in both lumen after Med pushed and both lumens flushed.    Scheduled to return for next antibiotic dose tomorrow     Electronically signed by Josseline Buckner RN on 4/22/2025 at 11:34 AM

## 2025-04-23 ENCOUNTER — HOSPITAL ENCOUNTER (OUTPATIENT)
Dept: INFUSION THERAPY | Age: 45
Setting detail: INFUSION SERIES
Discharge: HOME | End: 2025-04-23
Payer: COMMERCIAL

## 2025-04-23 VITALS
SYSTOLIC BLOOD PRESSURE: 133 MMHG | DIASTOLIC BLOOD PRESSURE: 67 MMHG | TEMPERATURE: 98.1 F | RESPIRATION RATE: 18 BRPM | HEART RATE: 76 BPM | OXYGEN SATURATION: 98 %

## 2025-04-23 DIAGNOSIS — J86.9 EMPYEMA (HCC): Primary | ICD-10-CM

## 2025-04-23 DIAGNOSIS — J90 PLEURAL EFFUSION ON LEFT: ICD-10-CM

## 2025-04-23 DIAGNOSIS — J90 RECURRENT LEFT PLEURAL EFFUSION: ICD-10-CM

## 2025-04-23 LAB
ALBUMIN SERPL-MCNC: 3.7 G/DL (ref 3.4–5)
ALBUMIN/GLOB SERPL: 1.1 {RATIO} (ref 1.1–2.2)
ALP SERPL-CCNC: 94 U/L (ref 40–129)
ALT SERPL-CCNC: 13 U/L (ref 10–40)
ANION GAP SERPL CALCULATED.3IONS-SCNC: 12 MMOL/L (ref 3–16)
AST SERPL-CCNC: 19 U/L (ref 15–37)
BASOPHILS # BLD: 0 K/UL (ref 0–0.2)
BASOPHILS NFR BLD: 0.3 %
BILIRUB SERPL-MCNC: 0.3 MG/DL (ref 0–1)
BUN SERPL-MCNC: 10 MG/DL (ref 7–20)
CALCIUM SERPL-MCNC: 9.2 MG/DL (ref 8.3–10.6)
CHLORIDE SERPL-SCNC: 104 MMOL/L (ref 99–110)
CO2 SERPL-SCNC: 22 MMOL/L (ref 21–32)
CREAT SERPL-MCNC: 0.6 MG/DL (ref 0.6–1.1)
CRP SERPL-MCNC: 3.4 MG/L (ref 0–5.1)
DEPRECATED RDW RBC AUTO: 13.7 % (ref 12.4–15.4)
EOSINOPHIL # BLD: 0.3 K/UL (ref 0–0.6)
EOSINOPHIL NFR BLD: 2.5 %
ERYTHROCYTE [SEDIMENTATION RATE] IN BLOOD BY WESTERGREN METHOD: 31 MM/HR (ref 0–20)
GFR SERPLBLD CREATININE-BSD FMLA CKD-EPI: >90 ML/MIN/{1.73_M2}
GLUCOSE SERPL-MCNC: 163 MG/DL (ref 70–99)
HCT VFR BLD AUTO: 34.7 % (ref 36–48)
HGB BLD-MCNC: 11.9 G/DL (ref 12–16)
LYMPHOCYTES # BLD: 1.7 K/UL (ref 1–5.1)
LYMPHOCYTES NFR BLD: 16 %
MCH RBC QN AUTO: 31.4 PG (ref 26–34)
MCHC RBC AUTO-ENTMCNC: 34.4 G/DL (ref 31–36)
MCV RBC AUTO: 91.4 FL (ref 80–100)
MONOCYTES # BLD: 0.5 K/UL (ref 0–1.3)
MONOCYTES NFR BLD: 5.2 %
NEUTROPHILS # BLD: 7.9 K/UL (ref 1.7–7.7)
NEUTROPHILS NFR BLD: 76 %
PLATELET # BLD AUTO: 209 K/UL (ref 135–450)
PMV BLD AUTO: 7.7 FL (ref 5–10.5)
POTASSIUM SERPL-SCNC: 4.3 MMOL/L (ref 3.5–5.1)
PROT SERPL-MCNC: 7.1 G/DL (ref 6.4–8.2)
RBC # BLD AUTO: 3.8 M/UL (ref 4–5.2)
SODIUM SERPL-SCNC: 138 MMOL/L (ref 136–145)
WBC # BLD AUTO: 10.4 K/UL (ref 4–11)

## 2025-04-23 PROCEDURE — 6360000002 HC RX W HCPCS: Performed by: INTERNAL MEDICINE

## 2025-04-23 PROCEDURE — 99212 OFFICE O/P EST SF 10 MIN: CPT

## 2025-04-23 PROCEDURE — 2580000003 HC RX 258: Performed by: INTERNAL MEDICINE

## 2025-04-23 PROCEDURE — 2500000003 HC RX 250 WO HCPCS: Performed by: INTERNAL MEDICINE

## 2025-04-23 PROCEDURE — 96374 THER/PROPH/DIAG INJ IV PUSH: CPT

## 2025-04-23 PROCEDURE — 80053 COMPREHEN METABOLIC PANEL: CPT

## 2025-04-23 PROCEDURE — 85652 RBC SED RATE AUTOMATED: CPT

## 2025-04-23 PROCEDURE — 85025 COMPLETE CBC W/AUTO DIFF WBC: CPT

## 2025-04-23 PROCEDURE — 86140 C-REACTIVE PROTEIN: CPT

## 2025-04-23 RX ORDER — SODIUM CHLORIDE 0.9 % (FLUSH) 0.9 %
5-40 SYRINGE (ML) INJECTION PRN
Status: DISCONTINUED | OUTPATIENT
Start: 2025-04-23 | End: 2025-04-24 | Stop reason: HOSPADM

## 2025-04-23 RX ORDER — SODIUM CHLORIDE 9 MG/ML
INJECTION, SOLUTION INTRAVENOUS CONTINUOUS
Status: CANCELLED | OUTPATIENT
Start: 2025-04-24

## 2025-04-23 RX ORDER — SODIUM CHLORIDE 0.9 % (FLUSH) 0.9 %
5-40 SYRINGE (ML) INJECTION PRN
Status: CANCELLED | OUTPATIENT
Start: 2025-04-24

## 2025-04-23 RX ORDER — EPINEPHRINE 1 MG/ML
0.3 INJECTION, SOLUTION, CONCENTRATE INTRAVENOUS PRN
Status: CANCELLED | OUTPATIENT
Start: 2025-04-24

## 2025-04-23 RX ORDER — SODIUM CHLORIDE 9 MG/ML
5-250 INJECTION, SOLUTION INTRAVENOUS PRN
Status: CANCELLED | OUTPATIENT
Start: 2025-04-24

## 2025-04-23 RX ORDER — HYDROCORTISONE SODIUM SUCCINATE 100 MG/2ML
100 INJECTION INTRAMUSCULAR; INTRAVENOUS
Status: CANCELLED | OUTPATIENT
Start: 2025-04-24

## 2025-04-23 RX ORDER — HEPARIN 100 UNIT/ML
500 SYRINGE INTRAVENOUS PRN
Status: CANCELLED | OUTPATIENT
Start: 2025-04-24

## 2025-04-23 RX ORDER — ONDANSETRON 2 MG/ML
8 INJECTION INTRAMUSCULAR; INTRAVENOUS
Status: CANCELLED | OUTPATIENT
Start: 2025-04-24

## 2025-04-23 RX ORDER — DIPHENHYDRAMINE HYDROCHLORIDE 50 MG/ML
50 INJECTION, SOLUTION INTRAMUSCULAR; INTRAVENOUS
Status: CANCELLED | OUTPATIENT
Start: 2025-04-24

## 2025-04-23 RX ORDER — ALBUTEROL SULFATE 90 UG/1
4 INHALANT RESPIRATORY (INHALATION) PRN
Status: CANCELLED | OUTPATIENT
Start: 2025-04-24

## 2025-04-23 RX ORDER — ACETAMINOPHEN 325 MG/1
650 TABLET ORAL
Status: CANCELLED | OUTPATIENT
Start: 2025-04-24

## 2025-04-23 RX ADMIN — SODIUM CHLORIDE, PRESERVATIVE FREE 10 ML: 5 INJECTION INTRAVENOUS at 11:59

## 2025-04-23 RX ADMIN — SODIUM CHLORIDE, PRESERVATIVE FREE 30 ML: 5 INJECTION INTRAVENOUS at 12:30

## 2025-04-23 RX ADMIN — SODIUM CHLORIDE, PRESERVATIVE FREE 10 ML: 5 INJECTION INTRAVENOUS at 12:00

## 2025-04-23 RX ADMIN — SODIUM CHLORIDE 1000 MG: 9 INJECTION INTRAMUSCULAR; INTRAVENOUS; SUBCUTANEOUS at 12:16

## 2025-04-23 RX ADMIN — SODIUM CHLORIDE, PRESERVATIVE FREE 10 ML: 5 INJECTION INTRAVENOUS at 12:16

## 2025-04-23 RX ADMIN — SODIUM CHLORIDE, PRESERVATIVE FREE 30 ML: 5 INJECTION INTRAVENOUS at 12:06

## 2025-04-23 ASSESSMENT — PAIN SCALES - GENERAL: PAINLEVEL_OUTOF10: 0

## 2025-04-23 NOTE — DISCHARGE INSTRUCTIONS
Outpatient Infusion Discharge Instructions  [facility]  [address] [place], [place] [address]  Telephone: [phone]      FAX [phone]    NAME:  Stacie Donato  YOB: 1980  MEDICAL RECORD NUMBER:  [id number]  DATE:  April 23, 2025    Reason for Outpatient Infusion Visit: IV antibiotics - Invanz, PICC dressing change and labs    If you develop any these symptoms please contact you Doctor    [] Nausea and/or vomiting not relieved with medication   [x] Swelling, redness, and/or bleeding at injection or IV site    [] Fever or chills  [x] Rash or itching   [x] Shortness of breath  [x] Please review After Visit Summary (AVS) information on: PICC care, Invanz    [x] Other: Return 4/24/2025 for next antibiotic dose      Outpatient Infusion Center Information: Should you experience any significant changes in your health or have questions about your care please contact the [facility] at [phone] MONDAY - FRIDAY 8:00 am - 4:00 pm.  If you need help outside these hours and cannot wait until we are again available, contact your Primary Care Physician or go to the hospital emergency room.       Electronically signed by Sheila Cerrato RN on 4/23/2025 at 2:14 PM

## 2025-04-23 NOTE — PROGRESS NOTES
Outpatient Infusion Center  [facility]    IV Antibiotic Visit with PICC lab draw and dressing change    NAME:  Stacie Donato  YOB: 1980  MEDICAL RECORD NUMBER:  [id number]  DATE:  4/23/2025    Patient arrived to Outpatient Infusion Center   [] per wheelchair   [x] Ambulatory    Alert and oriented x 4. Patient here for daily Invanz IVP for the treatment of left pleural effusion/empyema. Patient denies respiratory issues and side effects from IV antibiotics.   Patient has dual lumen PICC line in left basilic and it has had issues with patency/blood return. Yesterday, 4/22/2025, Altepase was placed in each lumen and allowed to dwell overnight. Upon arrival today, both lumens had + brisk blood return. 5 ml removed from each lumen before flushing and continuing with treatment. Patient also requesting that PICC dressing is changed today due to edges coming loose.     PICC Lab Draw    PICC Location: left arm/left basilic, dual lumen    PICC Site:  Redness: No  Bruising: No   Edema: No  Pain: No     PICC Labs  Cap cleansed with Chloroprep Scrub for 30 seconds and air dried completely for 1 minute on sterile 4X4: Yes    Blood drawn using a 10 ml syringe    Amount of blood wasted 10 ml.      Labs Obtained: Yes - drawn from red lumen    Lab Test(s) Ordered: CBC with diff, CMP, Sed rate, and CRP    PICC Flushed with 30ml NS and saline locked.    New Cap applied: Yes         IV Antibiotic Visit     Itching: No  Rash: No  Mouth Sores: No  Nausea: No  Vomiting: No  Diarrhea: No  Night Sweats: No  Fever: No    Administered: [] Peripheral access    [] PICC access    [] Port access    No Known Allergies    Name of Antibiotic Administered: Invanz IVP  Dose of Antibiotic Administered: 1000 mg.    Indication for Antibiotic: left pleural effusion/empyema        PICC Dressing Change    PICC Location: left arm/left basilic - dual lumen    PICC Site:  Redness: No  Bruising: No   Edema: No  Pain: No     PICC  Cleansed:  Current dressing and stat lock removed: Yes    Chloroprep Scrub for 30 seconds and air dried completely for 1 minute: Yes     PICC Dressing Change  Skin barrier: Yes    Stat lock applied and PICC line secured: Yes    Biopatch applied: No - Tegaderm with CHG gel used     PICC site covered with Tegaderm: Yes - covered with Tegaderm with CHG gel    Date and initials applied to PICC dressing: Yes    Next Dressing Due: April 30, 2025.     Response to treatment:  Well tolerated by patient.      Education: Verbal discharge instructions reviewed with patient. Verbalized understanding. Patient declined copy of AVS     Follow up: Scheduled to return for next antibiotic dose tomorrow    Electronically signed by Sheila Cerrato RN on 4/23/2025 at 1:54 PM      Clinic Level of Care Assessment  Infusion Center      NAME:  Stacie Donato  YOB: 1980 GENDER: female  MEDICAL RECORD NUMBER:  [id number]   DATE:  4/23/2025     Ambulation Status Document in Daily Care/Safety Tab   Status Definition Points   Independent Independently able to ambulate.  Fully able (without any assistance) to get on/off exam table/chair.  [x]   0   Minimal Physical Assistance Requires physical assistance of one person to ambulate and/or position patient to be examined. Includes necessary physical assistance to position lower extremities on/off stool. []   1   Moderate Physical Assistance Requires at least one staff member to physically assist patient in ambulating into treatment room, and/or on off chair/bed.  Requires assistance to bathroom. []   2   Full Assistance Requires assistance of at least two staff members to transfer patient into treatment room and/or on/off bed/chair. \"Total Transfer\".  Unable to use bathroom requires bedside commode and/or bedpan []   3       Dressing Complexity Document in LDA Navigator  Complexity Definition Points   No Dressing  []   0   Simple Minimal, simple dressing. i.e. bandaid, gauze, simple

## 2025-04-24 ENCOUNTER — HOSPITAL ENCOUNTER (OUTPATIENT)
Dept: INFUSION THERAPY | Age: 45
Setting detail: INFUSION SERIES
Discharge: HOME | End: 2025-04-24
Payer: COMMERCIAL

## 2025-04-24 VITALS
SYSTOLIC BLOOD PRESSURE: 137 MMHG | TEMPERATURE: 98.1 F | OXYGEN SATURATION: 96 % | HEART RATE: 80 BPM | RESPIRATION RATE: 16 BRPM | DIASTOLIC BLOOD PRESSURE: 93 MMHG

## 2025-04-24 DIAGNOSIS — J90 PLEURAL EFFUSION ON LEFT: ICD-10-CM

## 2025-04-24 DIAGNOSIS — J86.9 EMPYEMA (HCC): Primary | ICD-10-CM

## 2025-04-24 DIAGNOSIS — J90 RECURRENT LEFT PLEURAL EFFUSION: ICD-10-CM

## 2025-04-24 PROCEDURE — 2500000003 HC RX 250 WO HCPCS: Performed by: INTERNAL MEDICINE

## 2025-04-24 PROCEDURE — 96374 THER/PROPH/DIAG INJ IV PUSH: CPT

## 2025-04-24 PROCEDURE — 2580000003 HC RX 258: Performed by: INTERNAL MEDICINE

## 2025-04-24 PROCEDURE — 6360000002 HC RX W HCPCS: Performed by: INTERNAL MEDICINE

## 2025-04-24 RX ORDER — SODIUM CHLORIDE 0.9 % (FLUSH) 0.9 %
5-40 SYRINGE (ML) INJECTION PRN
Status: DISCONTINUED | OUTPATIENT
Start: 2025-04-24 | End: 2025-04-25 | Stop reason: HOSPADM

## 2025-04-24 RX ORDER — SODIUM CHLORIDE 9 MG/ML
5-250 INJECTION, SOLUTION INTRAVENOUS PRN
Status: CANCELLED | OUTPATIENT
Start: 2025-04-25

## 2025-04-24 RX ORDER — ONDANSETRON 2 MG/ML
8 INJECTION INTRAMUSCULAR; INTRAVENOUS
Status: CANCELLED | OUTPATIENT
Start: 2025-04-25

## 2025-04-24 RX ORDER — HYDROCORTISONE SODIUM SUCCINATE 100 MG/2ML
100 INJECTION INTRAMUSCULAR; INTRAVENOUS
Status: CANCELLED | OUTPATIENT
Start: 2025-04-25

## 2025-04-24 RX ORDER — DIPHENHYDRAMINE HYDROCHLORIDE 50 MG/ML
50 INJECTION, SOLUTION INTRAMUSCULAR; INTRAVENOUS
Status: CANCELLED | OUTPATIENT
Start: 2025-04-25

## 2025-04-24 RX ORDER — SODIUM CHLORIDE 9 MG/ML
INJECTION, SOLUTION INTRAVENOUS CONTINUOUS
Status: CANCELLED | OUTPATIENT
Start: 2025-04-25

## 2025-04-24 RX ORDER — EPINEPHRINE 1 MG/ML
0.3 INJECTION, SOLUTION, CONCENTRATE INTRAVENOUS PRN
Status: CANCELLED | OUTPATIENT
Start: 2025-04-25

## 2025-04-24 RX ORDER — ALBUTEROL SULFATE 90 UG/1
4 INHALANT RESPIRATORY (INHALATION) PRN
Status: CANCELLED | OUTPATIENT
Start: 2025-04-25

## 2025-04-24 RX ORDER — HEPARIN 100 UNIT/ML
500 SYRINGE INTRAVENOUS PRN
Status: CANCELLED | OUTPATIENT
Start: 2025-04-25

## 2025-04-24 RX ORDER — SODIUM CHLORIDE 0.9 % (FLUSH) 0.9 %
5-40 SYRINGE (ML) INJECTION PRN
Status: CANCELLED | OUTPATIENT
Start: 2025-04-25

## 2025-04-24 RX ORDER — ACETAMINOPHEN 325 MG/1
650 TABLET ORAL
Status: CANCELLED | OUTPATIENT
Start: 2025-04-25

## 2025-04-24 RX ADMIN — Medication 20 ML: at 11:07

## 2025-04-24 RX ADMIN — SODIUM CHLORIDE 1000 MG: 9 INJECTION INTRAMUSCULAR; INTRAVENOUS; SUBCUTANEOUS at 10:55

## 2025-04-24 RX ADMIN — Medication 10 ML: at 10:54

## 2025-04-24 NOTE — PROGRESS NOTES
Outpatient Infusion Center  [facility]    IV Antibiotic Visit    NAME:  Stacie Donato  YOB: 1980  MEDICAL RECORD NUMBER:  [id number]  DATE:  4/24/2025    Patient arrived to Outpatient Infusion Center   [] per wheelchair   [x] ambulatory     Itching: No  Rash: No  Mouth Sores: No  Nausea: No  Vomiting: No  Diarrhea: No  Night Sweats: No  Fever: No    Administered: [] Peripheral access    [x] PICC access    [] Port access    No Known Allergies    Name of Antibiotic Administered: Invanz  Dose of Antibiotic Administered: 1000 mg.    Indication for Antibiotic: left pl. Effusion and empyema      Response to treatment:  Well tolerated by patient.       Scheduled to return for next antibiotic dose tomorrow     Electronically signed by Frances Arellano RN on 4/24/2025 at 11:05 AM

## 2025-04-25 ENCOUNTER — HOSPITAL ENCOUNTER (OUTPATIENT)
Dept: INFUSION THERAPY | Age: 45
Setting detail: INFUSION SERIES
Discharge: HOME | End: 2025-04-25
Payer: COMMERCIAL

## 2025-04-25 ENCOUNTER — TELEPHONE (OUTPATIENT)
Dept: INTERNAL MEDICINE CLINIC | Age: 45
End: 2025-04-25

## 2025-04-25 VITALS
SYSTOLIC BLOOD PRESSURE: 121 MMHG | OXYGEN SATURATION: 95 % | DIASTOLIC BLOOD PRESSURE: 91 MMHG | TEMPERATURE: 98.2 F | RESPIRATION RATE: 16 BRPM | HEART RATE: 80 BPM

## 2025-04-25 DIAGNOSIS — E11.9 TYPE 2 DIABETES MELLITUS WITHOUT COMPLICATION, WITH LONG-TERM CURRENT USE OF INSULIN (HCC): Primary | ICD-10-CM

## 2025-04-25 DIAGNOSIS — J90 RECURRENT LEFT PLEURAL EFFUSION: ICD-10-CM

## 2025-04-25 DIAGNOSIS — J90 PLEURAL EFFUSION ON LEFT: ICD-10-CM

## 2025-04-25 DIAGNOSIS — Z79.4 TYPE 2 DIABETES MELLITUS WITHOUT COMPLICATION, WITH LONG-TERM CURRENT USE OF INSULIN (HCC): Primary | ICD-10-CM

## 2025-04-25 DIAGNOSIS — J86.9 EMPYEMA (HCC): Primary | ICD-10-CM

## 2025-04-25 PROCEDURE — 2500000003 HC RX 250 WO HCPCS: Performed by: INTERNAL MEDICINE

## 2025-04-25 PROCEDURE — 6360000002 HC RX W HCPCS: Performed by: INTERNAL MEDICINE

## 2025-04-25 PROCEDURE — 96374 THER/PROPH/DIAG INJ IV PUSH: CPT

## 2025-04-25 PROCEDURE — 2580000003 HC RX 258: Performed by: INTERNAL MEDICINE

## 2025-04-25 RX ORDER — ACETAMINOPHEN 325 MG/1
650 TABLET ORAL
Status: CANCELLED | OUTPATIENT
Start: 2025-04-26

## 2025-04-25 RX ORDER — SODIUM CHLORIDE 0.9 % (FLUSH) 0.9 %
5-40 SYRINGE (ML) INJECTION PRN
Status: CANCELLED | OUTPATIENT
Start: 2025-04-26

## 2025-04-25 RX ORDER — DIPHENHYDRAMINE HYDROCHLORIDE 50 MG/ML
50 INJECTION, SOLUTION INTRAMUSCULAR; INTRAVENOUS
Status: CANCELLED | OUTPATIENT
Start: 2025-04-26

## 2025-04-25 RX ORDER — EPINEPHRINE 1 MG/ML
0.3 INJECTION, SOLUTION, CONCENTRATE INTRAVENOUS PRN
Status: CANCELLED | OUTPATIENT
Start: 2025-04-26

## 2025-04-25 RX ORDER — ONDANSETRON 2 MG/ML
8 INJECTION INTRAMUSCULAR; INTRAVENOUS
Status: CANCELLED | OUTPATIENT
Start: 2025-04-26

## 2025-04-25 RX ORDER — SODIUM CHLORIDE 0.9 % (FLUSH) 0.9 %
5-40 SYRINGE (ML) INJECTION PRN
Status: DISCONTINUED | OUTPATIENT
Start: 2025-04-25 | End: 2025-04-26 | Stop reason: HOSPADM

## 2025-04-25 RX ORDER — HYDROCORTISONE SODIUM SUCCINATE 100 MG/2ML
100 INJECTION INTRAMUSCULAR; INTRAVENOUS
Status: CANCELLED | OUTPATIENT
Start: 2025-04-26

## 2025-04-25 RX ORDER — SODIUM CHLORIDE 9 MG/ML
INJECTION, SOLUTION INTRAVENOUS CONTINUOUS
Status: CANCELLED | OUTPATIENT
Start: 2025-04-26

## 2025-04-25 RX ORDER — HEPARIN 100 UNIT/ML
500 SYRINGE INTRAVENOUS PRN
Status: CANCELLED | OUTPATIENT
Start: 2025-04-26

## 2025-04-25 RX ORDER — SODIUM CHLORIDE 9 MG/ML
5-250 INJECTION, SOLUTION INTRAVENOUS PRN
Status: CANCELLED | OUTPATIENT
Start: 2025-04-26

## 2025-04-25 RX ORDER — ALBUTEROL SULFATE 90 UG/1
4 INHALANT RESPIRATORY (INHALATION) PRN
Status: CANCELLED | OUTPATIENT
Start: 2025-04-26

## 2025-04-25 RX ADMIN — SODIUM CHLORIDE, PRESERVATIVE FREE 10 ML: 5 INJECTION INTRAVENOUS at 13:02

## 2025-04-25 RX ADMIN — SODIUM CHLORIDE, PRESERVATIVE FREE 20 ML: 5 INJECTION INTRAVENOUS at 13:12

## 2025-04-25 RX ADMIN — SODIUM CHLORIDE, PRESERVATIVE FREE 10 ML: 5 INJECTION INTRAVENOUS at 13:00

## 2025-04-25 RX ADMIN — SODIUM CHLORIDE 1000 MG: 9 INJECTION INTRAMUSCULAR; INTRAVENOUS; SUBCUTANEOUS at 13:03

## 2025-04-25 RX ADMIN — SODIUM CHLORIDE, PRESERVATIVE FREE 20 ML: 5 INJECTION INTRAVENOUS at 13:11

## 2025-04-25 NOTE — PROGRESS NOTES
Outpatient Infusion Center  [facility]    IV Antibiotic Visit    NAME:  Stacie Donato  YOB: 1980  MEDICAL RECORD NUMBER:  [id number]  DATE:  4/25/2025    Patient arrived to Outpatient Infusion Center   [] per wheelchair   [x] Ambulatory    Alert and oriented X 4. Denies side effects from last infusion. States face has been a little itch but may be from dryness. No rash visible.  Denies shortness of breath, lung/ chest pain. States wanted to return to work next week but employer wont let her return until PICC is discontinued.     Itching: Yes face, no visible rash  Rash: No  Mouth Sores: No  Nausea: No  Vomiting: No  Diarrhea: No  Night Sweats: No  Fever: No    Administered: [] Peripheral access    [x] PICC access    [] Port access    No Known Allergies    Name of Antibiotic Administered: Invanz  Dose of Antibiotic Administered: 1000 mg.    Indication for Antibiotic: Empyema      Response to treatment:  Well tolerated by patient.       Scheduled to return for next antibiotic dose tomorrow     Electronically signed by YARIEL VIVEROS RN on 4/25/2025 at 12:57 PM

## 2025-04-25 NOTE — DISCHARGE INSTRUCTIONS
Body Location Override (Optional): Left upper cutaneous lip Outpatient Infusion Discharge Instructions    [address] [place], [place] [address]  Telephone: [phone]      FAX [phone]    NAME:  Stacie Donato  YOB: 1980  MEDICAL RECORD NUMBER:  [id number]  DATE:  4/25/25    Reason for Outpatient Infusion Visit: Invanz    If you develop any these symptoms please contact you Doctor    [] Nausea and/or vomiting not relieved with medication   [x] Swelling, redness, and/or bleeding at injection or IV site    [] Fever or chills  [x] Rash or itching   [] Shortness of breath  [x] Please review After Visit Summary (AVS) information on  Invanz  [] Other      * Daily antibiotic, reviewed side effects and when to call MD. Declined printed AVS.      Outpatient Infusion Center Information: Should you experience any significant changes in your health or have questions about your care please contact the [facility] at [phone] MONDAY - FRIDAY 8:00 am - 4:00 pm.  If you need help outside these hours and cannot wait until we are again available, contact your Primary Care Physician or go to the hospital emergency room.       Electronically signed by YARIEL VIVEROS RN on 4/25/2025 at 12:57 PM   Which Doctor Performed Mohs? (Optional): Susy

## 2025-04-26 ENCOUNTER — HOSPITAL ENCOUNTER (OUTPATIENT)
Dept: INFUSION THERAPY | Age: 45
Setting detail: INFUSION SERIES
Discharge: HOME | End: 2025-04-26
Payer: COMMERCIAL

## 2025-04-26 VITALS
HEART RATE: 91 BPM | RESPIRATION RATE: 16 BRPM | TEMPERATURE: 97.9 F | OXYGEN SATURATION: 96 % | DIASTOLIC BLOOD PRESSURE: 83 MMHG | SYSTOLIC BLOOD PRESSURE: 143 MMHG

## 2025-04-26 DIAGNOSIS — J86.9 EMPYEMA (HCC): Primary | ICD-10-CM

## 2025-04-26 DIAGNOSIS — J90 RECURRENT LEFT PLEURAL EFFUSION: ICD-10-CM

## 2025-04-26 DIAGNOSIS — J90 PLEURAL EFFUSION ON LEFT: ICD-10-CM

## 2025-04-26 PROCEDURE — 2500000003 HC RX 250 WO HCPCS: Performed by: INTERNAL MEDICINE

## 2025-04-26 PROCEDURE — 2580000003 HC RX 258: Performed by: INTERNAL MEDICINE

## 2025-04-26 PROCEDURE — 6360000002 HC RX W HCPCS: Performed by: INTERNAL MEDICINE

## 2025-04-26 PROCEDURE — 96374 THER/PROPH/DIAG INJ IV PUSH: CPT

## 2025-04-26 RX ORDER — SODIUM CHLORIDE 0.9 % (FLUSH) 0.9 %
5-40 SYRINGE (ML) INJECTION PRN
Status: DISCONTINUED | OUTPATIENT
Start: 2025-04-26 | End: 2025-04-27 | Stop reason: HOSPADM

## 2025-04-26 RX ORDER — HEPARIN 100 UNIT/ML
500 SYRINGE INTRAVENOUS PRN
Status: CANCELLED | OUTPATIENT
Start: 2025-04-27

## 2025-04-26 RX ORDER — ONDANSETRON 2 MG/ML
8 INJECTION INTRAMUSCULAR; INTRAVENOUS
Status: CANCELLED | OUTPATIENT
Start: 2025-04-27

## 2025-04-26 RX ORDER — SODIUM CHLORIDE 0.9 % (FLUSH) 0.9 %
5-40 SYRINGE (ML) INJECTION PRN
Status: CANCELLED | OUTPATIENT
Start: 2025-04-27

## 2025-04-26 RX ORDER — SODIUM CHLORIDE 9 MG/ML
5-250 INJECTION, SOLUTION INTRAVENOUS PRN
Status: CANCELLED | OUTPATIENT
Start: 2025-04-27

## 2025-04-26 RX ORDER — SODIUM CHLORIDE 9 MG/ML
INJECTION, SOLUTION INTRAVENOUS CONTINUOUS
Status: CANCELLED | OUTPATIENT
Start: 2025-04-27

## 2025-04-26 RX ORDER — ALBUTEROL SULFATE 90 UG/1
4 INHALANT RESPIRATORY (INHALATION) PRN
Status: CANCELLED | OUTPATIENT
Start: 2025-04-27

## 2025-04-26 RX ORDER — DIPHENHYDRAMINE HYDROCHLORIDE 50 MG/ML
50 INJECTION, SOLUTION INTRAMUSCULAR; INTRAVENOUS
Status: CANCELLED | OUTPATIENT
Start: 2025-04-27

## 2025-04-26 RX ORDER — EPINEPHRINE 1 MG/ML
0.3 INJECTION, SOLUTION, CONCENTRATE INTRAVENOUS PRN
Status: CANCELLED | OUTPATIENT
Start: 2025-04-27

## 2025-04-26 RX ORDER — ACETAMINOPHEN 325 MG/1
650 TABLET ORAL
Status: CANCELLED | OUTPATIENT
Start: 2025-04-27

## 2025-04-26 RX ORDER — HYDROCORTISONE SODIUM SUCCINATE 100 MG/2ML
100 INJECTION INTRAMUSCULAR; INTRAVENOUS
Status: CANCELLED | OUTPATIENT
Start: 2025-04-27

## 2025-04-26 RX ADMIN — SODIUM CHLORIDE, PRESERVATIVE FREE 30 ML: 5 INJECTION INTRAVENOUS at 09:53

## 2025-04-26 RX ADMIN — SODIUM CHLORIDE, PRESERVATIVE FREE 10 ML: 5 INJECTION INTRAVENOUS at 09:38

## 2025-04-26 RX ADMIN — SODIUM CHLORIDE 1000 MG: 9 INJECTION INTRAMUSCULAR; INTRAVENOUS; SUBCUTANEOUS at 09:38

## 2025-04-26 RX ADMIN — SODIUM CHLORIDE, PRESERVATIVE FREE 30 ML: 5 INJECTION INTRAVENOUS at 09:35

## 2025-04-26 ASSESSMENT — PAIN SCALES - GENERAL: PAINLEVEL_OUTOF10: 0

## 2025-04-26 NOTE — PROGRESS NOTES
Outpatient Infusion Center  [facility]    IV Antibiotic Visit    NAME:  Stacie Donato  YOB: 1980  MEDICAL RECORD NUMBER:  [id number]  DATE:  4/26/2025    Patient arrived to Outpatient Infusion Center   [] per wheelchair   [x] ambulatory     Alert and oriented x 4. Patient reports that she is feeling well and denies respiratory issues.   Denies side effects/adverse effects from previous IV antibiotics.     Itching: No  Rash: No  Mouth Sores: No  Nausea: No  Vomiting: No  Diarrhea: No  Night Sweats: No  Fever: No    Administered: [] Peripheral access    [x] PICC access - dual lumen, left basilic   [] Port access    No Known Allergies    Name of Antibiotic Administered: Invanz  Dose of Antibiotic Administered: 1000 mg.    Indication for Antibiotic:  left pleural effusion/empyema      Response to treatment:  Well tolerated by patient.     Follow up: Scheduled to return for next antibiotic dose tomorrow     Electronically signed by Sheila Cerrato RN on 4/26/2025 at 11:42 AM

## 2025-04-27 ENCOUNTER — HOSPITAL ENCOUNTER (OUTPATIENT)
Dept: INFUSION THERAPY | Age: 45
Setting detail: INFUSION SERIES
Discharge: HOME | End: 2025-04-27
Payer: COMMERCIAL

## 2025-04-27 VITALS
RESPIRATION RATE: 16 BRPM | OXYGEN SATURATION: 96 % | SYSTOLIC BLOOD PRESSURE: 139 MMHG | TEMPERATURE: 97.9 F | HEART RATE: 88 BPM | DIASTOLIC BLOOD PRESSURE: 93 MMHG

## 2025-04-27 DIAGNOSIS — J86.9 EMPYEMA (HCC): Primary | ICD-10-CM

## 2025-04-27 DIAGNOSIS — J90 PLEURAL EFFUSION ON LEFT: ICD-10-CM

## 2025-04-27 DIAGNOSIS — J90 RECURRENT LEFT PLEURAL EFFUSION: ICD-10-CM

## 2025-04-27 PROCEDURE — 6360000002 HC RX W HCPCS: Performed by: INTERNAL MEDICINE

## 2025-04-27 PROCEDURE — 2500000003 HC RX 250 WO HCPCS: Performed by: INTERNAL MEDICINE

## 2025-04-27 PROCEDURE — 2580000003 HC RX 258: Performed by: INTERNAL MEDICINE

## 2025-04-27 PROCEDURE — 96374 THER/PROPH/DIAG INJ IV PUSH: CPT

## 2025-04-27 RX ORDER — SODIUM CHLORIDE 0.9 % (FLUSH) 0.9 %
5-40 SYRINGE (ML) INJECTION PRN
Status: CANCELLED | OUTPATIENT
Start: 2025-04-28

## 2025-04-27 RX ORDER — ONDANSETRON 2 MG/ML
8 INJECTION INTRAMUSCULAR; INTRAVENOUS
Status: CANCELLED | OUTPATIENT
Start: 2025-04-28

## 2025-04-27 RX ORDER — ACETAMINOPHEN 325 MG/1
650 TABLET ORAL
Status: CANCELLED | OUTPATIENT
Start: 2025-04-28

## 2025-04-27 RX ORDER — ALBUTEROL SULFATE 90 UG/1
4 INHALANT RESPIRATORY (INHALATION) PRN
Status: CANCELLED | OUTPATIENT
Start: 2025-04-28

## 2025-04-27 RX ORDER — SODIUM CHLORIDE 9 MG/ML
5-250 INJECTION, SOLUTION INTRAVENOUS PRN
Status: CANCELLED | OUTPATIENT
Start: 2025-04-28

## 2025-04-27 RX ORDER — EPINEPHRINE 1 MG/ML
0.3 INJECTION, SOLUTION, CONCENTRATE INTRAVENOUS PRN
Status: CANCELLED | OUTPATIENT
Start: 2025-04-28

## 2025-04-27 RX ORDER — HYDROCORTISONE SODIUM SUCCINATE 100 MG/2ML
100 INJECTION INTRAMUSCULAR; INTRAVENOUS
Status: CANCELLED | OUTPATIENT
Start: 2025-04-28

## 2025-04-27 RX ORDER — HEPARIN 100 UNIT/ML
500 SYRINGE INTRAVENOUS PRN
Status: CANCELLED | OUTPATIENT
Start: 2025-04-28

## 2025-04-27 RX ORDER — DIPHENHYDRAMINE HYDROCHLORIDE 50 MG/ML
50 INJECTION, SOLUTION INTRAMUSCULAR; INTRAVENOUS
Status: CANCELLED | OUTPATIENT
Start: 2025-04-28

## 2025-04-27 RX ORDER — SODIUM CHLORIDE 0.9 % (FLUSH) 0.9 %
5-40 SYRINGE (ML) INJECTION PRN
Status: DISCONTINUED | OUTPATIENT
Start: 2025-04-27 | End: 2025-04-28 | Stop reason: HOSPADM

## 2025-04-27 RX ORDER — SODIUM CHLORIDE 9 MG/ML
INJECTION, SOLUTION INTRAVENOUS CONTINUOUS
Status: CANCELLED | OUTPATIENT
Start: 2025-04-28

## 2025-04-27 RX ADMIN — SODIUM CHLORIDE, PRESERVATIVE FREE 10 ML: 5 INJECTION INTRAVENOUS at 09:32

## 2025-04-27 RX ADMIN — SODIUM CHLORIDE, PRESERVATIVE FREE 20 ML: 5 INJECTION INTRAVENOUS at 09:41

## 2025-04-27 RX ADMIN — SODIUM CHLORIDE, PRESERVATIVE FREE 20 ML: 5 INJECTION INTRAVENOUS at 09:42

## 2025-04-27 RX ADMIN — SODIUM CHLORIDE 1000 MG: 9 INJECTION INTRAMUSCULAR; INTRAVENOUS; SUBCUTANEOUS at 09:34

## 2025-04-27 NOTE — PROGRESS NOTES
Outpatient Infusion Center  [facility]    IV Antibiotic Visit    NAME:  Stacie Donato  YOB: 1980  MEDICAL RECORD NUMBER:  [id number]  DATE:  4/27/2025    Patient arrived to Outpatient Infusion Center   [] per wheelchair   [x] Ambulatory    Alert and oriented X 4. Denies side effects from last infusion. Denies any increased pain in left side or respiratory s/s.     Itching: No  Rash: No  Mouth Sores: No  Nausea: No  Vomiting: No  Diarrhea: No  Night Sweats: No  Fever: No    Administered: [] Peripheral access    [x] PICC access    [] Port access    No Known Allergies    Name of Antibiotic Administered: Invanz  Dose of Antibiotic Administered: 1000 mg.    Indication for Antibiotic: left lung empyema      Response to treatment:  Well tolerated by patient.       Scheduled to return for next antibiotic dose tomorrow     Electronically signed by YARIEL VIVEROS RN on 4/27/2025 at 9:09 AM

## 2025-04-27 NOTE — DISCHARGE INSTRUCTIONS
Outpatient Infusion Discharge Instructions  [facility]  [address] [place], [place] [address]  Telephone: [phone]      FAX [phone]    NAME:  Stacie Donato  YOB: 1980  MEDICAL RECORD NUMBER:  [id number]  DATE:  4/27/25    Reason for Outpatient Infusion Visit: Invanz    If you develop any these symptoms please contact you Doctor    [] Nausea and/or vomiting not relieved with medication   [x] Swelling, redness, and/or bleeding at injection or IV site    [] Fever or chills  [x] Rash or itching   [] Shortness of breath  [x] Please review After Visit Summary (AVS) information on  Invanz  [] Other       * Daily antibiotic, reviewed side effects and when to call MD. Declined printed AVS.      Outpatient Infusion Center Information: Should you experience any significant changes in your health or have questions about your care please contact the [facility] at [phone] MONDAY - FRIDAY 8:00 am - 4:00 pm.  If you need help outside these hours and cannot wait until we are again available, contact your Primary Care Physician or go to the hospital emergency room.       Electronically signed by YARIEL VIVEROS RN on 4/27/2025 at 9:09 AM

## 2025-04-28 ENCOUNTER — HOSPITAL ENCOUNTER (OUTPATIENT)
Dept: INFUSION THERAPY | Age: 45
Setting detail: INFUSION SERIES
Discharge: HOME | End: 2025-04-28
Payer: COMMERCIAL

## 2025-04-28 DIAGNOSIS — J90 RECURRENT LEFT PLEURAL EFFUSION: ICD-10-CM

## 2025-04-28 DIAGNOSIS — J86.9 EMPYEMA (HCC): Primary | ICD-10-CM

## 2025-04-28 DIAGNOSIS — J90 PLEURAL EFFUSION ON LEFT: ICD-10-CM

## 2025-04-28 LAB
FUNGUS SPEC CULT: NORMAL
LOEFFLER MB STN SPEC: NORMAL

## 2025-04-28 PROCEDURE — 96374 THER/PROPH/DIAG INJ IV PUSH: CPT

## 2025-04-28 PROCEDURE — 2500000003 HC RX 250 WO HCPCS: Performed by: INTERNAL MEDICINE

## 2025-04-28 PROCEDURE — 2580000003 HC RX 258: Performed by: INTERNAL MEDICINE

## 2025-04-28 PROCEDURE — 99212 OFFICE O/P EST SF 10 MIN: CPT

## 2025-04-28 PROCEDURE — 6360000002 HC RX W HCPCS: Performed by: INTERNAL MEDICINE

## 2025-04-28 RX ORDER — SODIUM CHLORIDE 9 MG/ML
INJECTION, SOLUTION INTRAVENOUS CONTINUOUS
Status: CANCELLED | OUTPATIENT
Start: 2025-04-29

## 2025-04-28 RX ORDER — HEPARIN 100 UNIT/ML
500 SYRINGE INTRAVENOUS PRN
Status: CANCELLED | OUTPATIENT
Start: 2025-04-29

## 2025-04-28 RX ORDER — SODIUM CHLORIDE 9 MG/ML
5-250 INJECTION, SOLUTION INTRAVENOUS PRN
Status: CANCELLED | OUTPATIENT
Start: 2025-04-29

## 2025-04-28 RX ORDER — ONDANSETRON 2 MG/ML
8 INJECTION INTRAMUSCULAR; INTRAVENOUS
Status: CANCELLED | OUTPATIENT
Start: 2025-04-29

## 2025-04-28 RX ORDER — SODIUM CHLORIDE 0.9 % (FLUSH) 0.9 %
5-40 SYRINGE (ML) INJECTION PRN
Status: DISCONTINUED | OUTPATIENT
Start: 2025-04-28 | End: 2025-04-29 | Stop reason: HOSPADM

## 2025-04-28 RX ORDER — SODIUM CHLORIDE 0.9 % (FLUSH) 0.9 %
5-40 SYRINGE (ML) INJECTION PRN
Status: CANCELLED | OUTPATIENT
Start: 2025-04-29

## 2025-04-28 RX ORDER — HYDROCORTISONE SODIUM SUCCINATE 100 MG/2ML
100 INJECTION INTRAMUSCULAR; INTRAVENOUS
Status: CANCELLED | OUTPATIENT
Start: 2025-04-29

## 2025-04-28 RX ORDER — DIPHENHYDRAMINE HYDROCHLORIDE 50 MG/ML
50 INJECTION, SOLUTION INTRAMUSCULAR; INTRAVENOUS
Status: CANCELLED | OUTPATIENT
Start: 2025-04-29

## 2025-04-28 RX ORDER — ACETAMINOPHEN 325 MG/1
650 TABLET ORAL
Status: CANCELLED | OUTPATIENT
Start: 2025-04-29

## 2025-04-28 RX ORDER — EPINEPHRINE 1 MG/ML
0.3 INJECTION, SOLUTION, CONCENTRATE INTRAVENOUS PRN
Status: CANCELLED | OUTPATIENT
Start: 2025-04-29

## 2025-04-28 RX ORDER — ALBUTEROL SULFATE 90 UG/1
4 INHALANT RESPIRATORY (INHALATION) PRN
Status: CANCELLED | OUTPATIENT
Start: 2025-04-29

## 2025-04-28 RX ADMIN — Medication 10 ML: at 11:20

## 2025-04-28 RX ADMIN — Medication 20 ML: at 11:32

## 2025-04-28 RX ADMIN — SODIUM CHLORIDE 1000 MG: 9 INJECTION INTRAMUSCULAR; INTRAVENOUS; SUBCUTANEOUS at 11:27

## 2025-04-28 RX ADMIN — Medication 20 ML: at 11:33

## 2025-04-28 ASSESSMENT — PATIENT HEALTH QUESTIONNAIRE - PHQ9
10. IF YOU CHECKED OFF ANY PROBLEMS, HOW DIFFICULT HAVE THESE PROBLEMS MADE IT FOR YOU TO DO YOUR WORK, TAKE CARE OF THINGS AT HOME, OR GET ALONG WITH OTHER PEOPLE: SOMEWHAT DIFFICULT
1. LITTLE INTEREST OR PLEASURE IN DOING THINGS: MORE THAN HALF THE DAYS
SUM OF ALL RESPONSES TO PHQ QUESTIONS 1-9: 18
10. IF YOU CHECKED OFF ANY PROBLEMS, HOW DIFFICULT HAVE THESE PROBLEMS MADE IT FOR YOU TO DO YOUR WORK, TAKE CARE OF THINGS AT HOME, OR GET ALONG WITH OTHER PEOPLE: SOMEWHAT DIFFICULT
SUM OF ALL RESPONSES TO PHQ9 QUESTIONS 1 & 2: 4
3. TROUBLE FALLING OR STAYING ASLEEP: NEARLY EVERY DAY
3. TROUBLE FALLING OR STAYING ASLEEP: NEARLY EVERY DAY
6. FEELING BAD ABOUT YOURSELF - OR THAT YOU ARE A FAILURE OR HAVE LET YOURSELF OR YOUR FAMILY DOWN: MORE THAN HALF THE DAYS
5. POOR APPETITE OR OVEREATING: NEARLY EVERY DAY
8. MOVING OR SPEAKING SO SLOWLY THAT OTHER PEOPLE COULD HAVE NOTICED. OR THE OPPOSITE, BEING SO FIGETY OR RESTLESS THAT YOU HAVE BEEN MOVING AROUND A LOT MORE THAN USUAL: NOT AT ALL
SUM OF ALL RESPONSES TO PHQ QUESTIONS 1-9: 18
1. LITTLE INTEREST OR PLEASURE IN DOING THINGS: MORE THAN HALF THE DAYS
9. THOUGHTS THAT YOU WOULD BE BETTER OFF DEAD, OR OF HURTING YOURSELF: NOT AT ALL
2. FEELING DOWN, DEPRESSED OR HOPELESS: MORE THAN HALF THE DAYS
7. TROUBLE CONCENTRATING ON THINGS, SUCH AS READING THE NEWSPAPER OR WATCHING TELEVISION: NEARLY EVERY DAY
4. FEELING TIRED OR HAVING LITTLE ENERGY: NEARLY EVERY DAY
2. FEELING DOWN, DEPRESSED OR HOPELESS: MORE THAN HALF THE DAYS
6. FEELING BAD ABOUT YOURSELF - OR THAT YOU ARE A FAILURE OR HAVE LET YOURSELF OR YOUR FAMILY DOWN: MORE THAN HALF THE DAYS
4. FEELING TIRED OR HAVING LITTLE ENERGY: NEARLY EVERY DAY
9. THOUGHTS THAT YOU WOULD BE BETTER OFF DEAD, OR OF HURTING YOURSELF: NOT AT ALL
8. MOVING OR SPEAKING SO SLOWLY THAT OTHER PEOPLE COULD HAVE NOTICED. OR THE OPPOSITE - BEING SO FIDGETY OR RESTLESS THAT YOU HAVE BEEN MOVING AROUND A LOT MORE THAN USUAL: NOT AT ALL
SUM OF ALL RESPONSES TO PHQ QUESTIONS 1-9: 18
7. TROUBLE CONCENTRATING ON THINGS, SUCH AS READING THE NEWSPAPER OR WATCHING TELEVISION: NEARLY EVERY DAY
SUM OF ALL RESPONSES TO PHQ QUESTIONS 1-9: 18
5. POOR APPETITE OR OVEREATING: NEARLY EVERY DAY
SUM OF ALL RESPONSES TO PHQ QUESTIONS 1-9: 18

## 2025-04-28 NOTE — PROGRESS NOTES
Outpatient Infusion Center      IV Antibiotic Visit    NAME:  Stacie Donato  YOB: 1980  MEDICAL RECORD NUMBER:  [id number]  DATE:  4/28/2025    Patient arrived to Outpatient Infusion Center   [] per wheelchair   [x] ambulatory     Alert and oriented X 4. Denies side effects from last infusion. Denies any new s/s of infection or respiratory s/s.     Itching: Yes states skin is very dry  Rash: No  Mouth Sores: No  Nausea: No  Vomiting: No  Diarrhea: No  Night Sweats: No  Fever: No    Administered: [] Peripheral access    [x] PICC access    [] Port access    No Known Allergies    Name of Antibiotic Administered: Invanz  Dose of Antibiotic Administered: 1000 mg.    Indication for Antibiotic: Empyema left lung      Response to treatment:  Well tolerated by patient.       Scheduled to return for next antibiotic dose tomorrow     PICC Dressing Change    PICC Location:left arm, Dressing peeling up at edges. Last changed 4/23/25    PICC Site:  Redness: No  Bruising: No   Edema: No  Pain: No     PICC Cleansed:  Current dressing and stat lock removed: Yes  Chloroprep Scrub for 30 seconds and air dried completely for 1 minute: Yes     PICC Dressing Change  Skin barrier: Yes  Stat lock applied and PICC line secured Yes  PICC site covered with Tegaderm with CHG patch Yes  Date and initials applied to PICC dressing Yes  [x] Other: extra tegaderm dressing applied to cover securement device due to 3.5 cm external catheter present     Next Dressing Due: May 5, 2025.     Response to treatment:  Well tolerated by patient.      Electronically signed by YARIEL VIVEROS RN on 4/28/2025 at 11:41 AM    Clinic Level of Care Assessment  Infusion Center      NAME:  Stacie Donato  YOB: 1980 GENDER: female  MEDICAL RECORD NUMBER:  [id number]   DATE:  4/28/2025     Ambulation Status Document in Daily Care/Safety Tab   Status Definition Points   Independent Independently able to ambulate.

## 2025-04-28 NOTE — DISCHARGE INSTRUCTIONS
Outpatient Infusion Discharge Instructions  [facility]  [address] [facility] [address] [place], [place] [address]  Telephone: [phone]      FAX [phone]    NAME:  Stacie Donato  YOB: 1980  MEDICAL RECORD NUMBER:  [id number]  DATE:  4/28/25    Reason for Outpatient Infusion Visit: Invanz    If you develop any these symptoms please contact you Doctor    [] Nausea and/or vomiting not relieved with medication   [x] Swelling, redness, and/or bleeding at injection or IV site    [] Fever or chills  [] Rash or itching   [] Shortness of breath  [x] Please review After Visit Summary (AVS) information on  Invanz  [] Other     * Daily antibiotic, reviewed side effects and when to call MD. Declined printed AVS.      Outpatient Infusion Center Information: Should you experience any significant changes in your health or have questions about your care please contact the [facility] at [phone] MONDAY - FRIDAY 8:00 am - 4:00 pm.  If you need help outside these hours and cannot wait until we are again available, contact your Primary Care Physician or go to the hospital emergency room.       Electronically signed by YARIEL VIVEROS RN on 4/28/2025 at 11:32 AM

## 2025-04-29 ENCOUNTER — HOSPITAL ENCOUNTER (OUTPATIENT)
Dept: INFUSION THERAPY | Age: 45
Setting detail: INFUSION SERIES
Discharge: HOME | End: 2025-04-29
Payer: COMMERCIAL

## 2025-04-29 ENCOUNTER — OFFICE VISIT (OUTPATIENT)
Dept: PRIMARY CARE CLINIC | Age: 45
End: 2025-04-29
Payer: COMMERCIAL

## 2025-04-29 VITALS
TEMPERATURE: 97.7 F | RESPIRATION RATE: 16 BRPM | BODY MASS INDEX: 43.78 KG/M2 | DIASTOLIC BLOOD PRESSURE: 94 MMHG | SYSTOLIC BLOOD PRESSURE: 136 MMHG | HEIGHT: 60 IN | OXYGEN SATURATION: 97 % | WEIGHT: 223 LBS | HEART RATE: 83 BPM

## 2025-04-29 VITALS
DIASTOLIC BLOOD PRESSURE: 93 MMHG | HEART RATE: 86 BPM | TEMPERATURE: 97.4 F | BODY MASS INDEX: 44.68 KG/M2 | SYSTOLIC BLOOD PRESSURE: 134 MMHG | WEIGHT: 228.8 LBS | OXYGEN SATURATION: 95 %

## 2025-04-29 DIAGNOSIS — J15.69 GRAM-NEGATIVE PNEUMONIA (HCC): ICD-10-CM

## 2025-04-29 DIAGNOSIS — J90 PLEURAL EFFUSION ON LEFT: ICD-10-CM

## 2025-04-29 DIAGNOSIS — J86.9 EMPYEMA (HCC): Primary | ICD-10-CM

## 2025-04-29 DIAGNOSIS — J91.8 PARAPNEUMONIC EFFUSION: ICD-10-CM

## 2025-04-29 DIAGNOSIS — J90 RECURRENT LEFT PLEURAL EFFUSION: ICD-10-CM

## 2025-04-29 DIAGNOSIS — B37.31 CANDIDA VAGINITIS: ICD-10-CM

## 2025-04-29 DIAGNOSIS — Z79.4 TYPE 2 DIABETES MELLITUS WITHOUT COMPLICATION, WITH LONG-TERM CURRENT USE OF INSULIN (HCC): Primary | ICD-10-CM

## 2025-04-29 DIAGNOSIS — J18.9 PARAPNEUMONIC EFFUSION: ICD-10-CM

## 2025-04-29 DIAGNOSIS — E11.9 TYPE 2 DIABETES MELLITUS WITHOUT COMPLICATION, WITH LONG-TERM CURRENT USE OF INSULIN (HCC): Primary | ICD-10-CM

## 2025-04-29 LAB
ACID FAST STN SPEC QL: NORMAL
ACID FAST STN SPEC QL: NORMAL
MYCOBACTERIUM SPEC CULT: NORMAL
MYCOBACTERIUM SPEC CULT: NORMAL

## 2025-04-29 PROCEDURE — 3046F HEMOGLOBIN A1C LEVEL >9.0%: CPT | Performed by: INTERNAL MEDICINE

## 2025-04-29 PROCEDURE — 6360000002 HC RX W HCPCS: Performed by: INTERNAL MEDICINE

## 2025-04-29 PROCEDURE — 96374 THER/PROPH/DIAG INJ IV PUSH: CPT

## 2025-04-29 PROCEDURE — 2580000003 HC RX 258: Performed by: INTERNAL MEDICINE

## 2025-04-29 PROCEDURE — 99214 OFFICE O/P EST MOD 30 MIN: CPT | Performed by: INTERNAL MEDICINE

## 2025-04-29 PROCEDURE — 2500000003 HC RX 250 WO HCPCS: Performed by: INTERNAL MEDICINE

## 2025-04-29 RX ORDER — ONDANSETRON 2 MG/ML
8 INJECTION INTRAMUSCULAR; INTRAVENOUS
Status: CANCELLED | OUTPATIENT
Start: 2025-04-30

## 2025-04-29 RX ORDER — SODIUM CHLORIDE 9 MG/ML
INJECTION, SOLUTION INTRAVENOUS CONTINUOUS
Status: CANCELLED | OUTPATIENT
Start: 2025-04-30

## 2025-04-29 RX ORDER — ACETAMINOPHEN 325 MG/1
650 TABLET ORAL
Status: CANCELLED | OUTPATIENT
Start: 2025-04-30

## 2025-04-29 RX ORDER — DIPHENHYDRAMINE HYDROCHLORIDE 50 MG/ML
50 INJECTION, SOLUTION INTRAMUSCULAR; INTRAVENOUS
Status: CANCELLED | OUTPATIENT
Start: 2025-04-30

## 2025-04-29 RX ORDER — SODIUM CHLORIDE 0.9 % (FLUSH) 0.9 %
5-40 SYRINGE (ML) INJECTION PRN
Status: CANCELLED | OUTPATIENT
Start: 2025-04-30

## 2025-04-29 RX ORDER — METFORMIN HYDROCHLORIDE 500 MG/1
500 TABLET, EXTENDED RELEASE ORAL 2 TIMES DAILY
Qty: 180 TABLET | Refills: 2 | Status: SHIPPED | OUTPATIENT
Start: 2025-04-29

## 2025-04-29 RX ORDER — FLUCONAZOLE 150 MG/1
150 TABLET ORAL ONCE
Qty: 1 TABLET | Refills: 0 | Status: SHIPPED | OUTPATIENT
Start: 2025-04-29 | End: 2025-04-29

## 2025-04-29 RX ORDER — ALBUTEROL SULFATE 90 UG/1
4 INHALANT RESPIRATORY (INHALATION) PRN
Status: CANCELLED | OUTPATIENT
Start: 2025-04-30

## 2025-04-29 RX ORDER — SODIUM CHLORIDE 9 MG/ML
5-250 INJECTION, SOLUTION INTRAVENOUS PRN
Status: CANCELLED | OUTPATIENT
Start: 2025-04-30

## 2025-04-29 RX ORDER — EPINEPHRINE 1 MG/ML
0.3 INJECTION, SOLUTION, CONCENTRATE INTRAVENOUS PRN
Status: CANCELLED | OUTPATIENT
Start: 2025-04-30

## 2025-04-29 RX ORDER — SODIUM CHLORIDE 0.9 % (FLUSH) 0.9 %
5-40 SYRINGE (ML) INJECTION PRN
Status: DISCONTINUED | OUTPATIENT
Start: 2025-04-29 | End: 2025-04-30 | Stop reason: HOSPADM

## 2025-04-29 RX ORDER — HYDROCORTISONE SODIUM SUCCINATE 100 MG/2ML
100 INJECTION INTRAMUSCULAR; INTRAVENOUS
Status: CANCELLED | OUTPATIENT
Start: 2025-04-30

## 2025-04-29 RX ORDER — HEPARIN 100 UNIT/ML
500 SYRINGE INTRAVENOUS PRN
Status: CANCELLED | OUTPATIENT
Start: 2025-04-30

## 2025-04-29 RX ADMIN — SODIUM CHLORIDE 1000 MG: 9 INJECTION INTRAMUSCULAR; INTRAVENOUS; SUBCUTANEOUS at 11:04

## 2025-04-29 RX ADMIN — Medication 30 ML: at 11:18

## 2025-04-29 NOTE — PROGRESS NOTES
Outpatient Infusion Center  [facility]    IV Antibiotic Visit    NAME:  Stacie Donato  YOB: 1980  MEDICAL RECORD NUMBER:  [id number]  DATE:  4/29/2025    Patient arrived to Outpatient Infusion Center   [] per wheelchair   [x] ambulatory     Patient alert and oriented x4.     Itching: No  Rash: Yes / slight rash on forehead but patient was sitting out in the sun yesterday; patient educated on wearing sunscreen   Mouth Sores: No  Nausea: No  Vomiting: No  Diarrhea: No  Night Sweats: No  Fever: No    Administered: [] Peripheral access    [x] PICC access    [] Port access    No Known Allergies    Name of Antibiotic Administered: INvanz  Dose of Antibiotic Administered: 1000 mg.    Indication for Antibiotic: left lung infection      Response to treatment:  Well tolerated by patient.       Scheduled to return for next antibiotic dose tomorrow     Electronically signed by Milagros Reilly RN on 4/29/2025 at 10:55 AM

## 2025-04-29 NOTE — DISCHARGE INSTRUCTIONS
Outpatient Infusion Discharge Instructions  [facility]  [address] [facility] [address] [facility] [address] [place], [place] [address]  Telephone: [phone]      FAX [phone]    NAME:  Stacie Donato  YOB: 1980  MEDICAL RECORD NUMBER:  [id number]  DATE:  @ED@    Reason for Outpatient Infusion Visit: IV antibiotics    If you develop any these symptoms please contact you Doctor    [x] Nausea and/or vomiting not relieved with medication   [x] Swelling, redness, and/or bleeding at injection or IV site    [x] Fever or chills  [x] Rash or itching   [x] Shortness of breath  [x] Please review After Visit Summary (AVS) information on    [] Other      Outpatient Infusion Center Information: Should you experience any significant changes in your health or have questions about your care please contact the [facility] at [phone] MONDAY - FRIDAY 8:00 am - 4:00 pm.  If you need help outside these hours and cannot wait until we are again available, contact your Primary Care Physician or go to the hospital emergency room.       Electronically signed by Milagros Reilly RN on 4/29/2025 at 11:08 AM

## 2025-04-29 NOTE — PROGRESS NOTES
SUBJECTIVE: Follow up from 3/27, in the interim she was hospitalized (4/9 - 4/18) with empyema requiring a chest tube and TNK.  She has PICC line for Invanz (ertapenem) 1000 mg IV daily until May 9 with Dr Hernandez and will be seen May 7.  CT chest pending May 6.  Glucose levels much better during hospitalization .  Morning glucose levels at home have been 150s.  She is losing weight and follows with Mere Navarro at [facility] and increased metformin XR to 500 mg BID.  She was at [facility] for certification and pharmacist recommended Trulicity.     History of present illness:  Hospitalized (3/7 - 3/18) with left-sided pleural effusion with cavitation requiring IV antibiotics and thoracentesis, discharged on linezolid and Augmentin with two doses remaining.  Diagnosed with type 2 diabetes and discharged on metformin 500 mg BID with some gastritis, started yesterday.   Also discharged on glargine insulin Basaglar injection pen 28 units daily with lispro Humalog 8 units with meals.  She was seen by pharmacy [facility] (3/24) and was started on metformin.  Pleuritic pain has mostly resolved and now able to lay on left side.  She works at [employer] in [place] and experiences exertional dyspnea with walking around the hospital.  She is using the incentive spirometer that she can in the hospital.      History of plaque psoriasis managed by  Dermatology at [facility] with topical steroid and Skyrizi (risankizumab) every three months, last in Nov 2024. Last Quantiferon available for review was in 10/2023 was negative.  She is pending the next injection but told by Infectious Disease not to get.  She was previously on Humuria, Lotezla, Cosentrix for psoriasis and was doing well on Skyrizy.  Family history of diabetes in maternal aunt and maternal grandmother.       MEDICATIONS:  famotidine (PEPCID) 10 MG tablet Take 1 tablet by mouth 2 times daily as needed   insulin glargine (LANTUS;BASAGLAR) 100

## 2025-04-30 ENCOUNTER — HOSPITAL ENCOUNTER (OUTPATIENT)
Dept: INFUSION THERAPY | Age: 45
Setting detail: INFUSION SERIES
Discharge: HOME | End: 2025-04-30
Payer: COMMERCIAL

## 2025-04-30 VITALS
TEMPERATURE: 97.9 F | OXYGEN SATURATION: 99 % | RESPIRATION RATE: 16 BRPM | SYSTOLIC BLOOD PRESSURE: 144 MMHG | DIASTOLIC BLOOD PRESSURE: 99 MMHG | HEART RATE: 80 BPM

## 2025-04-30 DIAGNOSIS — J90 PLEURAL EFFUSION ON LEFT: ICD-10-CM

## 2025-04-30 DIAGNOSIS — J86.9 EMPYEMA (HCC): Primary | ICD-10-CM

## 2025-04-30 DIAGNOSIS — J90 RECURRENT LEFT PLEURAL EFFUSION: ICD-10-CM

## 2025-04-30 LAB
ALBUMIN SERPL-MCNC: 3.9 G/DL (ref 3.4–5)
ALBUMIN/GLOB SERPL: 1.1 {RATIO} (ref 1.1–2.2)
ALP SERPL-CCNC: 100 U/L (ref 40–129)
ALT SERPL-CCNC: 15 U/L (ref 10–40)
ANION GAP SERPL CALCULATED.3IONS-SCNC: 11 MMOL/L (ref 3–16)
AST SERPL-CCNC: 21 U/L (ref 15–37)
BASOPHILS # BLD: 0.1 K/UL (ref 0–0.2)
BASOPHILS NFR BLD: 1.3 %
BILIRUB SERPL-MCNC: 0.5 MG/DL (ref 0–1)
BUN SERPL-MCNC: 10 MG/DL (ref 7–20)
CALCIUM SERPL-MCNC: 9.4 MG/DL (ref 8.3–10.6)
CHLORIDE SERPL-SCNC: 103 MMOL/L (ref 99–110)
CO2 SERPL-SCNC: 25 MMOL/L (ref 21–32)
CREAT SERPL-MCNC: 0.6 MG/DL (ref 0.6–1.1)
CRP SERPL-MCNC: 4.1 MG/L (ref 0–5.1)
DEPRECATED RDW RBC AUTO: 14.3 % (ref 12.4–15.4)
EOSINOPHIL # BLD: 0.4 K/UL (ref 0–0.6)
EOSINOPHIL NFR BLD: 4.5 %
ERYTHROCYTE [SEDIMENTATION RATE] IN BLOOD BY WESTERGREN METHOD: 52 MM/HR (ref 0–20)
GFR SERPLBLD CREATININE-BSD FMLA CKD-EPI: >90 ML/MIN/{1.73_M2}
GLUCOSE SERPL-MCNC: 176 MG/DL (ref 70–99)
HCT VFR BLD AUTO: 36.5 % (ref 36–48)
HGB BLD-MCNC: 12.6 G/DL (ref 12–16)
LYMPHOCYTES # BLD: 2 K/UL (ref 1–5.1)
LYMPHOCYTES NFR BLD: 24.1 %
MCH RBC QN AUTO: 31.4 PG (ref 26–34)
MCHC RBC AUTO-ENTMCNC: 34.5 G/DL (ref 31–36)
MCV RBC AUTO: 90.9 FL (ref 80–100)
MONOCYTES # BLD: 0.4 K/UL (ref 0–1.3)
MONOCYTES NFR BLD: 4.9 %
NEUTROPHILS # BLD: 5.5 K/UL (ref 1.7–7.7)
NEUTROPHILS NFR BLD: 65.2 %
PLATELET # BLD AUTO: 308 K/UL (ref 135–450)
PMV BLD AUTO: 7.8 FL (ref 5–10.5)
POTASSIUM SERPL-SCNC: 4.3 MMOL/L (ref 3.5–5.1)
PROT SERPL-MCNC: 7.4 G/DL (ref 6.4–8.2)
RBC # BLD AUTO: 4.02 M/UL (ref 4–5.2)
SODIUM SERPL-SCNC: 139 MMOL/L (ref 136–145)
WBC # BLD AUTO: 8.4 K/UL (ref 4–11)

## 2025-04-30 PROCEDURE — 80053 COMPREHEN METABOLIC PANEL: CPT

## 2025-04-30 PROCEDURE — 96374 THER/PROPH/DIAG INJ IV PUSH: CPT

## 2025-04-30 PROCEDURE — 2500000003 HC RX 250 WO HCPCS: Performed by: INTERNAL MEDICINE

## 2025-04-30 PROCEDURE — 85025 COMPLETE CBC W/AUTO DIFF WBC: CPT

## 2025-04-30 PROCEDURE — 85652 RBC SED RATE AUTOMATED: CPT

## 2025-04-30 PROCEDURE — 2580000003 HC RX 258: Performed by: INTERNAL MEDICINE

## 2025-04-30 PROCEDURE — 86140 C-REACTIVE PROTEIN: CPT

## 2025-04-30 PROCEDURE — 6360000002 HC RX W HCPCS: Performed by: INTERNAL MEDICINE

## 2025-04-30 RX ORDER — SODIUM CHLORIDE 9 MG/ML
5-250 INJECTION, SOLUTION INTRAVENOUS PRN
Status: CANCELLED | OUTPATIENT
Start: 2025-05-01

## 2025-04-30 RX ORDER — ALBUTEROL SULFATE 90 UG/1
4 INHALANT RESPIRATORY (INHALATION) PRN
Status: CANCELLED | OUTPATIENT
Start: 2025-05-01

## 2025-04-30 RX ORDER — EPINEPHRINE 1 MG/ML
0.3 INJECTION, SOLUTION, CONCENTRATE INTRAVENOUS PRN
Status: CANCELLED | OUTPATIENT
Start: 2025-05-01

## 2025-04-30 RX ORDER — SODIUM CHLORIDE 0.9 % (FLUSH) 0.9 %
5-40 SYRINGE (ML) INJECTION PRN
Status: CANCELLED | OUTPATIENT
Start: 2025-05-01

## 2025-04-30 RX ORDER — SODIUM CHLORIDE 0.9 % (FLUSH) 0.9 %
5-40 SYRINGE (ML) INJECTION PRN
Status: DISCONTINUED | OUTPATIENT
Start: 2025-04-30 | End: 2025-05-01 | Stop reason: HOSPADM

## 2025-04-30 RX ORDER — SODIUM CHLORIDE 9 MG/ML
INJECTION, SOLUTION INTRAVENOUS CONTINUOUS
Status: CANCELLED | OUTPATIENT
Start: 2025-05-01

## 2025-04-30 RX ORDER — HEPARIN 100 UNIT/ML
500 SYRINGE INTRAVENOUS PRN
Status: CANCELLED | OUTPATIENT
Start: 2025-05-01

## 2025-04-30 RX ORDER — ACETAMINOPHEN 325 MG/1
650 TABLET ORAL
Status: CANCELLED | OUTPATIENT
Start: 2025-05-01

## 2025-04-30 RX ORDER — DIPHENHYDRAMINE HYDROCHLORIDE 50 MG/ML
50 INJECTION, SOLUTION INTRAMUSCULAR; INTRAVENOUS
Status: CANCELLED | OUTPATIENT
Start: 2025-05-01

## 2025-04-30 RX ORDER — ONDANSETRON 2 MG/ML
8 INJECTION INTRAMUSCULAR; INTRAVENOUS
Status: CANCELLED | OUTPATIENT
Start: 2025-05-01

## 2025-04-30 RX ORDER — HYDROCORTISONE SODIUM SUCCINATE 100 MG/2ML
100 INJECTION INTRAMUSCULAR; INTRAVENOUS
Status: CANCELLED | OUTPATIENT
Start: 2025-05-01

## 2025-04-30 RX ADMIN — SODIUM CHLORIDE 1000 MG: 9 INJECTION INTRAMUSCULAR; INTRAVENOUS; SUBCUTANEOUS at 11:25

## 2025-04-30 RX ADMIN — Medication 10 ML: at 11:22

## 2025-04-30 RX ADMIN — Medication 20 ML: at 11:24

## 2025-04-30 RX ADMIN — Medication 20 ML: at 11:39

## 2025-04-30 RX ADMIN — Medication 20 ML: at 11:37

## 2025-04-30 NOTE — PROGRESS NOTES
Outpatient Infusion Center   [facility]    PICC Lab Draw    NAME:  Stacie Donato  YOB: 1980  MEDICAL RECORD NUMBER:  [id number]  DATE:  4/30/2025    Patient arrived to Outpatient Infusion Center   [] per wheelchair   [x] ambulatory     PICC Location:left arm    PICC Site:  Redness: No  Bruising: No   Edema: No  Pain: No     PICC Labs  Cap cleansed with Chloroprep Scrub for 30 seconds and air dried completely for 1 minute on sterile 4X4: Yes  Blood drawn using a 10/ml syringe  Amount of blood wasted 10/ml syringe    Labs Obtained: Yes  Lab Test(s) Ordered: cmp, cbc, crp, esr  PICC Flushed with 20ml/NS: Yes  New Cap applied: Yes     Response to treatment:  Well tolerated by patient.        IV Antibiotic Visit    NAME:  Stacie Donato  YOB: 1980  MEDICAL RECORD NUMBER:  [id number]  DATE:  4/30/2025    Patient arrived to Outpatient Infusion Center   [] per wheelchair   [x] ambulatory     Alert and oriented x 4. Denies side effects from last infusion. Denies new s/s of infection or respiratory s/s. States has an occasional pain in left side but resolves on its own and not severe pain like when diagnosed.     Itching: No  Rash: No  Mouth Sores: No  Nausea: No  Vomiting: No  Diarrhea: No  Night Sweats: No  Fever: No    Administered: [] Peripheral access    [x] PICC access    [] Port access    No Known Allergies    Name of Antibiotic Administered: Invanz  Dose of Antibiotic Administered: 1000 mg.    Indication for Antibiotic: Empyema left lung      Response to treatment:  Well tolerated by patient.       Scheduled to return for next antibiotic dose tomorrow     Electronically signed by YARIEL VIVEROS RN on 4/30/2025 at 11:36 AM

## 2025-04-30 NOTE — DISCHARGE INSTRUCTIONS
Outpatient Infusion Discharge Instructions  [facility]  [address] [place], [place] [address]  Telephone: [phone]      FAX [phone]    NAME:  Stacie Donato  YOB: 1980  MEDICAL RECORD NUMBER:  [id number]  DATE:  4/30/25    Reason for Outpatient Infusion Visit: Invanz    If you develop any these symptoms please contact you Doctor    [x] Nausea and/or vomiting not relieved with medication   [x] Swelling, redness, and/or bleeding at injection or IV site    [] Fever or chills  [x] Rash or itching   [] Shortness of breath  [x] Please review After Visit Summary (AVS) information on  Invanz  [] Other     * Daily antibiotic, reviewed side effects and when to call MD. Declined printed AVS.      Outpatient Infusion Center Information: Should you experience any significant changes in your health or have questions about your care please contact the [facility] at [phone] MONDAY - FRIDAY 8:00 am - 4:00 pm.  If you need help outside these hours and cannot wait until we are again available, contact your Primary Care Physician or go to the hospital emergency room.       Electronically signed by YARIEL VIVEROS RN on 4/30/2025 at 11:39 AM

## 2025-05-01 ENCOUNTER — HOSPITAL ENCOUNTER (OUTPATIENT)
Dept: INFUSION THERAPY | Age: 45
Setting detail: INFUSION SERIES
Discharge: HOME | End: 2025-05-01
Payer: COMMERCIAL

## 2025-05-01 VITALS
TEMPERATURE: 98.2 F | WEIGHT: 223 LBS | BODY MASS INDEX: 43.78 KG/M2 | RESPIRATION RATE: 16 BRPM | SYSTOLIC BLOOD PRESSURE: 125 MMHG | OXYGEN SATURATION: 96 % | DIASTOLIC BLOOD PRESSURE: 89 MMHG | HEIGHT: 60 IN | HEART RATE: 83 BPM

## 2025-05-01 DIAGNOSIS — J86.9 EMPYEMA (HCC): Primary | ICD-10-CM

## 2025-05-01 DIAGNOSIS — J90 PLEURAL EFFUSION ON LEFT: ICD-10-CM

## 2025-05-01 DIAGNOSIS — J90 RECURRENT LEFT PLEURAL EFFUSION: ICD-10-CM

## 2025-05-01 PROCEDURE — 96374 THER/PROPH/DIAG INJ IV PUSH: CPT

## 2025-05-01 PROCEDURE — 2580000003 HC RX 258: Performed by: INTERNAL MEDICINE

## 2025-05-01 PROCEDURE — 2500000003 HC RX 250 WO HCPCS: Performed by: INTERNAL MEDICINE

## 2025-05-01 PROCEDURE — 6360000002 HC RX W HCPCS: Performed by: INTERNAL MEDICINE

## 2025-05-01 RX ORDER — SODIUM CHLORIDE 9 MG/ML
5-250 INJECTION, SOLUTION INTRAVENOUS PRN
Status: CANCELLED | OUTPATIENT
Start: 2025-05-02

## 2025-05-01 RX ORDER — EPINEPHRINE 1 MG/ML
0.3 INJECTION, SOLUTION, CONCENTRATE INTRAVENOUS PRN
Status: CANCELLED | OUTPATIENT
Start: 2025-05-02

## 2025-05-01 RX ORDER — HEPARIN 100 UNIT/ML
500 SYRINGE INTRAVENOUS PRN
Status: CANCELLED | OUTPATIENT
Start: 2025-05-02

## 2025-05-01 RX ORDER — SODIUM CHLORIDE 9 MG/ML
INJECTION, SOLUTION INTRAVENOUS CONTINUOUS
Status: CANCELLED | OUTPATIENT
Start: 2025-05-02

## 2025-05-01 RX ORDER — SODIUM CHLORIDE 0.9 % (FLUSH) 0.9 %
5-40 SYRINGE (ML) INJECTION PRN
Status: DISCONTINUED | OUTPATIENT
Start: 2025-05-01 | End: 2025-05-02 | Stop reason: HOSPADM

## 2025-05-01 RX ORDER — HYDROCORTISONE SODIUM SUCCINATE 100 MG/2ML
100 INJECTION INTRAMUSCULAR; INTRAVENOUS
Status: CANCELLED | OUTPATIENT
Start: 2025-05-02

## 2025-05-01 RX ORDER — ACETAMINOPHEN 325 MG/1
650 TABLET ORAL
Status: CANCELLED | OUTPATIENT
Start: 2025-05-02

## 2025-05-01 RX ORDER — ONDANSETRON 2 MG/ML
8 INJECTION INTRAMUSCULAR; INTRAVENOUS
Status: CANCELLED | OUTPATIENT
Start: 2025-05-02

## 2025-05-01 RX ORDER — DIPHENHYDRAMINE HYDROCHLORIDE 50 MG/ML
50 INJECTION, SOLUTION INTRAMUSCULAR; INTRAVENOUS
Status: CANCELLED | OUTPATIENT
Start: 2025-05-02

## 2025-05-01 RX ORDER — ALBUTEROL SULFATE 90 UG/1
4 INHALANT RESPIRATORY (INHALATION) PRN
Status: CANCELLED | OUTPATIENT
Start: 2025-05-02

## 2025-05-01 RX ORDER — SODIUM CHLORIDE 0.9 % (FLUSH) 0.9 %
5-40 SYRINGE (ML) INJECTION PRN
Status: CANCELLED | OUTPATIENT
Start: 2025-05-02

## 2025-05-01 RX ADMIN — SODIUM CHLORIDE 1000 MG: 9 INJECTION INTRAMUSCULAR; INTRAVENOUS; SUBCUTANEOUS at 10:55

## 2025-05-01 RX ADMIN — SODIUM CHLORIDE, PRESERVATIVE FREE 40 ML: 5 INJECTION INTRAVENOUS at 11:04

## 2025-05-01 NOTE — PROGRESS NOTES
Outpatient Infusion Center  [facility]    IV Antibiotic Visit    NAME:  Stacie Donato  YOB: 1980  MEDICAL RECORD NUMBER:  [id number]  DATE:  5/1/2025    Patient arrived to Outpatient Infusion Center   [] per wheelchair   [x] ambulatory     Patient alert and oriented x4.     Itching: No  Rash: No  Mouth Sores: No  Nausea: No  Vomiting: No  Diarrhea: No  Night Sweats: No  Fever: No    Administered: [] Peripheral access    [] PICC access    [] Port access    No Known Allergies    Name of Antibiotic Administered: Invanz  Dose of Antibiotic Administered: 1000 mg.    Indication for Antibiotic: : Empyema left lung       Response to treatment:  Well tolerated by patient.       Scheduled to return for next antibiotic dose tomorrow     Electronically signed by Milagros Reilly RN on 5/1/2025 at 10:57 AM

## 2025-05-01 NOTE — DISCHARGE INSTRUCTIONS
Outpatient Infusion Discharge Instructions  [facility]  [address] [place], [place] [address]  Telephone: [phone]      FAX [phone]    NAME:  Stacie Donato  YOB: 1980  MEDICAL RECORD NUMBER:  [id number]  DATE:  @ED@    Reason for Outpatient Infusion Visit: IV antibiotics      If you develop any these symptoms please contact you Doctor    [x] Nausea and/or vomiting not relieved with medication   [x] Swelling, redness, and/or bleeding at injection or IV site    [x] Fever or chills  [x] Rash or itching   [x] Shortness of breath  [x] Please review After Visit Summary (AVS) information on    [] Other      Outpatient Infusion Center Information: Should you experience any significant changes in your health or have questions about your care please contact the [facility] at [phone] MONDAY - FRIDAY 8:00 am - 4:00 pm.  If you need help outside these hours and cannot wait until we are again available, contact your Primary Care Physician or go to the hospital emergency room.       Electronically signed by Milagros Reilly RN on 5/1/2025 at 11:05 AM

## 2025-05-02 ENCOUNTER — HOSPITAL ENCOUNTER (OUTPATIENT)
Dept: INFUSION THERAPY | Age: 45
Setting detail: INFUSION SERIES
Discharge: HOME | End: 2025-05-02
Payer: COMMERCIAL

## 2025-05-02 VITALS
SYSTOLIC BLOOD PRESSURE: 115 MMHG | DIASTOLIC BLOOD PRESSURE: 79 MMHG | HEART RATE: 85 BPM | RESPIRATION RATE: 16 BRPM | TEMPERATURE: 97.9 F

## 2025-05-02 DIAGNOSIS — J86.9 EMPYEMA (HCC): Primary | ICD-10-CM

## 2025-05-02 DIAGNOSIS — J90 RECURRENT LEFT PLEURAL EFFUSION: ICD-10-CM

## 2025-05-02 DIAGNOSIS — J90 PLEURAL EFFUSION ON LEFT: ICD-10-CM

## 2025-05-02 PROCEDURE — 2580000003 HC RX 258: Performed by: INTERNAL MEDICINE

## 2025-05-02 PROCEDURE — 96374 THER/PROPH/DIAG INJ IV PUSH: CPT

## 2025-05-02 PROCEDURE — 6360000002 HC RX W HCPCS: Performed by: INTERNAL MEDICINE

## 2025-05-02 PROCEDURE — 2500000003 HC RX 250 WO HCPCS: Performed by: INTERNAL MEDICINE

## 2025-05-02 RX ORDER — EPINEPHRINE 1 MG/ML
0.3 INJECTION, SOLUTION, CONCENTRATE INTRAVENOUS PRN
Status: CANCELLED | OUTPATIENT
Start: 2025-05-03

## 2025-05-02 RX ORDER — HEPARIN 100 UNIT/ML
500 SYRINGE INTRAVENOUS PRN
Status: CANCELLED | OUTPATIENT
Start: 2025-05-03

## 2025-05-02 RX ORDER — SODIUM CHLORIDE 9 MG/ML
INJECTION, SOLUTION INTRAVENOUS CONTINUOUS
Status: CANCELLED | OUTPATIENT
Start: 2025-05-03

## 2025-05-02 RX ORDER — HYDROCORTISONE SODIUM SUCCINATE 100 MG/2ML
100 INJECTION INTRAMUSCULAR; INTRAVENOUS
Status: CANCELLED | OUTPATIENT
Start: 2025-05-03

## 2025-05-02 RX ORDER — SODIUM CHLORIDE 9 MG/ML
5-250 INJECTION, SOLUTION INTRAVENOUS PRN
Status: CANCELLED | OUTPATIENT
Start: 2025-05-03

## 2025-05-02 RX ORDER — ONDANSETRON 2 MG/ML
8 INJECTION INTRAMUSCULAR; INTRAVENOUS
Status: CANCELLED | OUTPATIENT
Start: 2025-05-03

## 2025-05-02 RX ORDER — ALBUTEROL SULFATE 90 UG/1
4 INHALANT RESPIRATORY (INHALATION) PRN
Status: CANCELLED | OUTPATIENT
Start: 2025-05-03

## 2025-05-02 RX ORDER — ACETAMINOPHEN 325 MG/1
650 TABLET ORAL
Status: CANCELLED | OUTPATIENT
Start: 2025-05-03

## 2025-05-02 RX ORDER — SODIUM CHLORIDE 0.9 % (FLUSH) 0.9 %
5-40 SYRINGE (ML) INJECTION PRN
Status: DISCONTINUED | OUTPATIENT
Start: 2025-05-02 | End: 2025-05-03 | Stop reason: HOSPADM

## 2025-05-02 RX ORDER — DIPHENHYDRAMINE HYDROCHLORIDE 50 MG/ML
50 INJECTION, SOLUTION INTRAMUSCULAR; INTRAVENOUS
Status: CANCELLED | OUTPATIENT
Start: 2025-05-03

## 2025-05-02 RX ORDER — SODIUM CHLORIDE 0.9 % (FLUSH) 0.9 %
5-40 SYRINGE (ML) INJECTION PRN
Status: CANCELLED | OUTPATIENT
Start: 2025-05-03

## 2025-05-02 RX ADMIN — Medication 40 ML: at 10:55

## 2025-05-02 RX ADMIN — Medication 10 ML: at 10:42

## 2025-05-02 RX ADMIN — SODIUM CHLORIDE 1000 MG: 9 INJECTION INTRAMUSCULAR; INTRAVENOUS; SUBCUTANEOUS at 10:43

## 2025-05-02 NOTE — PROGRESS NOTES
Outpatient Infusion Center  [facility]    IV Antibiotic Visit    NAME:  Stacie Donato  YOB: 1980  MEDICAL RECORD NUMBER:  [id number]  DATE:  5/2/2025    Patient arrived to Outpatient Infusion Center   [] per wheelchair   [x] ambulatory     Itching: No  Rash: No  Mouth Sores: No  Nausea: No  Vomiting: No  Diarrhea: No  Night Sweats: No  Fever: No    Administered: [] Peripheral access    [x] PICC access    [] Port access    No Known Allergies    Name of Antibiotic Administered: Invanz  Dose of Antibiotic Administered: 1000 mg.    Indication for Antibiotic: Emoyema left lung      Response to treatment:  Well tolerated by patient.       Scheduled to return for next antibiotic dose tomorrow     Electronically signed by Maranda De La Vega RN on 5/2/2025 at 10:55 AM

## 2025-05-03 ENCOUNTER — HOSPITAL ENCOUNTER (OUTPATIENT)
Dept: INFUSION THERAPY | Age: 45
Setting detail: INFUSION SERIES
Discharge: HOME | End: 2025-05-03
Payer: COMMERCIAL

## 2025-05-03 VITALS
TEMPERATURE: 98.2 F | RESPIRATION RATE: 16 BRPM | SYSTOLIC BLOOD PRESSURE: 123 MMHG | HEART RATE: 84 BPM | OXYGEN SATURATION: 96 % | DIASTOLIC BLOOD PRESSURE: 83 MMHG

## 2025-05-03 DIAGNOSIS — J90 PLEURAL EFFUSION ON LEFT: ICD-10-CM

## 2025-05-03 DIAGNOSIS — J90 RECURRENT LEFT PLEURAL EFFUSION: ICD-10-CM

## 2025-05-03 DIAGNOSIS — J86.9 EMPYEMA (HCC): Primary | ICD-10-CM

## 2025-05-03 PROCEDURE — 6360000002 HC RX W HCPCS: Performed by: INTERNAL MEDICINE

## 2025-05-03 PROCEDURE — 96374 THER/PROPH/DIAG INJ IV PUSH: CPT

## 2025-05-03 PROCEDURE — 2500000003 HC RX 250 WO HCPCS: Performed by: INTERNAL MEDICINE

## 2025-05-03 PROCEDURE — 2580000003 HC RX 258: Performed by: INTERNAL MEDICINE

## 2025-05-03 RX ORDER — SODIUM CHLORIDE 9 MG/ML
INJECTION, SOLUTION INTRAVENOUS CONTINUOUS
OUTPATIENT
Start: 2025-05-04

## 2025-05-03 RX ORDER — DIPHENHYDRAMINE HYDROCHLORIDE 50 MG/ML
50 INJECTION, SOLUTION INTRAMUSCULAR; INTRAVENOUS
OUTPATIENT
Start: 2025-05-04

## 2025-05-03 RX ORDER — SODIUM CHLORIDE 0.9 % (FLUSH) 0.9 %
5-40 SYRINGE (ML) INJECTION PRN
Status: DISCONTINUED | OUTPATIENT
Start: 2025-05-03 | End: 2025-05-04 | Stop reason: HOSPADM

## 2025-05-03 RX ORDER — HEPARIN 100 UNIT/ML
500 SYRINGE INTRAVENOUS PRN
OUTPATIENT
Start: 2025-05-04

## 2025-05-03 RX ORDER — EPINEPHRINE 1 MG/ML
0.3 INJECTION, SOLUTION, CONCENTRATE INTRAVENOUS PRN
OUTPATIENT
Start: 2025-05-04

## 2025-05-03 RX ORDER — ALBUTEROL SULFATE 90 UG/1
4 INHALANT RESPIRATORY (INHALATION) PRN
OUTPATIENT
Start: 2025-05-04

## 2025-05-03 RX ORDER — ACETAMINOPHEN 325 MG/1
650 TABLET ORAL
OUTPATIENT
Start: 2025-05-04

## 2025-05-03 RX ORDER — SODIUM CHLORIDE 9 MG/ML
5-250 INJECTION, SOLUTION INTRAVENOUS PRN
OUTPATIENT
Start: 2025-05-04

## 2025-05-03 RX ORDER — SODIUM CHLORIDE 0.9 % (FLUSH) 0.9 %
5-40 SYRINGE (ML) INJECTION PRN
Status: CANCELLED | OUTPATIENT
Start: 2025-05-04

## 2025-05-03 RX ORDER — ONDANSETRON 2 MG/ML
8 INJECTION INTRAMUSCULAR; INTRAVENOUS
OUTPATIENT
Start: 2025-05-04

## 2025-05-03 RX ORDER — HYDROCORTISONE SODIUM SUCCINATE 100 MG/2ML
100 INJECTION INTRAMUSCULAR; INTRAVENOUS
OUTPATIENT
Start: 2025-05-04

## 2025-05-03 RX ADMIN — Medication 10 ML: at 11:05

## 2025-05-03 RX ADMIN — Medication 10 ML: at 10:57

## 2025-05-03 RX ADMIN — Medication 20 ML: at 11:06

## 2025-05-03 RX ADMIN — SODIUM CHLORIDE 1000 MG: 9 INJECTION INTRAMUSCULAR; INTRAVENOUS; SUBCUTANEOUS at 10:58

## 2025-05-03 NOTE — PROGRESS NOTES
Outpatient Infusion Center  [facility]    IV Antibiotic Visit    NAME:  Stacie Donato  YOB: 1980  MEDICAL RECORD NUMBER:  [id number]  DATE:  5/3/2025    Patient arrived to Outpatient Infusion Center   [] per wheelchair   [x] ambulatory     Itching: No  Rash: No  Mouth Sores: No  Nausea: No  Vomiting: No  Diarrhea: No  Night Sweats: No  Fever: No    Administered: [] Peripheral access    [x] PICC access    [] Port access    No Known Allergies    Name of Antibiotic Administered: Invanz  Dose of Antibiotic Administered: 1000 mg.    Indication for Antibiotic: lung infection      Response to treatment:  Well tolerated by patient.       Scheduled to return for next antibiotic dose tomorrow     Electronically signed by Frances Arellano RN on 5/3/2025 at 11:09 AM

## 2025-05-04 ENCOUNTER — HOSPITAL ENCOUNTER (OUTPATIENT)
Dept: INFUSION THERAPY | Age: 45
Setting detail: INFUSION SERIES
Discharge: HOME | End: 2025-05-04
Payer: COMMERCIAL

## 2025-05-04 VITALS
DIASTOLIC BLOOD PRESSURE: 73 MMHG | HEART RATE: 85 BPM | SYSTOLIC BLOOD PRESSURE: 123 MMHG | RESPIRATION RATE: 16 BRPM | TEMPERATURE: 98.2 F

## 2025-05-04 DIAGNOSIS — J90 RECURRENT LEFT PLEURAL EFFUSION: ICD-10-CM

## 2025-05-04 DIAGNOSIS — J86.9 EMPYEMA (HCC): Primary | ICD-10-CM

## 2025-05-04 DIAGNOSIS — J90 PLEURAL EFFUSION ON LEFT: ICD-10-CM

## 2025-05-04 PROCEDURE — 6360000002 HC RX W HCPCS: Performed by: INTERNAL MEDICINE

## 2025-05-04 PROCEDURE — 96374 THER/PROPH/DIAG INJ IV PUSH: CPT

## 2025-05-04 PROCEDURE — 2580000003 HC RX 258: Performed by: INTERNAL MEDICINE

## 2025-05-04 PROCEDURE — 2500000003 HC RX 250 WO HCPCS: Performed by: INTERNAL MEDICINE

## 2025-05-04 RX ORDER — SODIUM CHLORIDE 0.9 % (FLUSH) 0.9 %
5-40 SYRINGE (ML) INJECTION PRN
Status: CANCELLED | OUTPATIENT
Start: 2025-05-05

## 2025-05-04 RX ORDER — HYDROCORTISONE SODIUM SUCCINATE 100 MG/2ML
100 INJECTION INTRAMUSCULAR; INTRAVENOUS
Status: CANCELLED | OUTPATIENT
Start: 2025-05-05

## 2025-05-04 RX ORDER — ALBUTEROL SULFATE 90 UG/1
4 INHALANT RESPIRATORY (INHALATION) PRN
Status: CANCELLED | OUTPATIENT
Start: 2025-05-05

## 2025-05-04 RX ORDER — DIPHENHYDRAMINE HYDROCHLORIDE 50 MG/ML
50 INJECTION, SOLUTION INTRAMUSCULAR; INTRAVENOUS
Status: CANCELLED | OUTPATIENT
Start: 2025-05-05

## 2025-05-04 RX ORDER — SODIUM CHLORIDE 9 MG/ML
5-250 INJECTION, SOLUTION INTRAVENOUS PRN
Status: CANCELLED | OUTPATIENT
Start: 2025-05-05

## 2025-05-04 RX ORDER — ONDANSETRON 2 MG/ML
8 INJECTION INTRAMUSCULAR; INTRAVENOUS
Status: CANCELLED | OUTPATIENT
Start: 2025-05-05

## 2025-05-04 RX ORDER — HEPARIN 100 UNIT/ML
500 SYRINGE INTRAVENOUS PRN
Status: CANCELLED | OUTPATIENT
Start: 2025-05-05

## 2025-05-04 RX ORDER — SODIUM CHLORIDE 9 MG/ML
INJECTION, SOLUTION INTRAVENOUS CONTINUOUS
Status: CANCELLED | OUTPATIENT
Start: 2025-05-05

## 2025-05-04 RX ORDER — ACETAMINOPHEN 325 MG/1
650 TABLET ORAL
Status: CANCELLED | OUTPATIENT
Start: 2025-05-05

## 2025-05-04 RX ORDER — SODIUM CHLORIDE 0.9 % (FLUSH) 0.9 %
5-40 SYRINGE (ML) INJECTION PRN
Status: DISCONTINUED | OUTPATIENT
Start: 2025-05-04 | End: 2025-05-05 | Stop reason: HOSPADM

## 2025-05-04 RX ORDER — EPINEPHRINE 1 MG/ML
0.3 INJECTION, SOLUTION, CONCENTRATE INTRAVENOUS PRN
Status: CANCELLED | OUTPATIENT
Start: 2025-05-05

## 2025-05-04 RX ADMIN — Medication 10 ML: at 10:58

## 2025-05-04 RX ADMIN — SODIUM CHLORIDE 1000 MG: 9 INJECTION INTRAMUSCULAR; INTRAVENOUS; SUBCUTANEOUS at 10:59

## 2025-05-04 RX ADMIN — Medication 40 ML: at 11:09

## 2025-05-04 NOTE — PROGRESS NOTES
Outpatient Infusion Center  [facility]    IV Antibiotic Visit    NAME:  Stacie Donato  YOB: 1980  MEDICAL RECORD NUMBER:  [id number]  DATE:  5/4/2025    Patient arrived to Outpatient Infusion Center   [] per wheelchair   [x] ambulatory     Itching: No  Rash: No  Mouth Sores: No  Nausea: No  Vomiting: No  Diarrhea: No  Night Sweats: No  Fever: No    Administered: [] Peripheral access    [x] PICC access    [] Port access    No Known Allergies    Name of Antibiotic Administered: Invanz  Dose of Antibiotic Administered: 1000 mg.    Indication for Antibiotic: Empyema      Response to treatment:  Well tolerated by patient.       Scheduled to return for next antibiotic dose tomRROW     Electronically signed by Maranda De La Vega RN on 5/4/2025 at 11:03 AM

## 2025-05-05 ENCOUNTER — HOSPITAL ENCOUNTER (OUTPATIENT)
Dept: INFUSION THERAPY | Age: 45
Setting detail: INFUSION SERIES
Discharge: HOME | End: 2025-05-05
Payer: COMMERCIAL

## 2025-05-05 VITALS
OXYGEN SATURATION: 97 % | SYSTOLIC BLOOD PRESSURE: 131 MMHG | HEART RATE: 84 BPM | TEMPERATURE: 98.1 F | DIASTOLIC BLOOD PRESSURE: 89 MMHG | RESPIRATION RATE: 16 BRPM

## 2025-05-05 DIAGNOSIS — J90 RECURRENT LEFT PLEURAL EFFUSION: ICD-10-CM

## 2025-05-05 DIAGNOSIS — J90 PLEURAL EFFUSION ON LEFT: ICD-10-CM

## 2025-05-05 DIAGNOSIS — J86.9 EMPYEMA (HCC): Primary | ICD-10-CM

## 2025-05-05 LAB
FUNGUS SPEC CULT: NORMAL
LOEFFLER MB STN SPEC: NORMAL

## 2025-05-05 PROCEDURE — 2500000003 HC RX 250 WO HCPCS: Performed by: INTERNAL MEDICINE

## 2025-05-05 PROCEDURE — 96374 THER/PROPH/DIAG INJ IV PUSH: CPT

## 2025-05-05 PROCEDURE — 2580000003 HC RX 258: Performed by: INTERNAL MEDICINE

## 2025-05-05 PROCEDURE — 6360000002 HC RX W HCPCS: Performed by: INTERNAL MEDICINE

## 2025-05-05 RX ORDER — DIPHENHYDRAMINE HYDROCHLORIDE 50 MG/ML
50 INJECTION, SOLUTION INTRAMUSCULAR; INTRAVENOUS
Status: CANCELLED | OUTPATIENT
Start: 2025-05-06

## 2025-05-05 RX ORDER — SODIUM CHLORIDE 9 MG/ML
5-250 INJECTION, SOLUTION INTRAVENOUS PRN
Status: CANCELLED | OUTPATIENT
Start: 2025-05-06

## 2025-05-05 RX ORDER — SODIUM CHLORIDE 0.9 % (FLUSH) 0.9 %
5-40 SYRINGE (ML) INJECTION PRN
Status: DISCONTINUED | OUTPATIENT
Start: 2025-05-05 | End: 2025-05-06 | Stop reason: HOSPADM

## 2025-05-05 RX ORDER — SODIUM CHLORIDE 0.9 % (FLUSH) 0.9 %
5-40 SYRINGE (ML) INJECTION PRN
Status: CANCELLED | OUTPATIENT
Start: 2025-05-06

## 2025-05-05 RX ORDER — ACETAMINOPHEN 325 MG/1
650 TABLET ORAL
Status: CANCELLED | OUTPATIENT
Start: 2025-05-06

## 2025-05-05 RX ORDER — HYDROCORTISONE SODIUM SUCCINATE 100 MG/2ML
100 INJECTION INTRAMUSCULAR; INTRAVENOUS
Status: CANCELLED | OUTPATIENT
Start: 2025-05-06

## 2025-05-05 RX ORDER — EPINEPHRINE 1 MG/ML
0.3 INJECTION, SOLUTION, CONCENTRATE INTRAVENOUS PRN
Status: CANCELLED | OUTPATIENT
Start: 2025-05-06

## 2025-05-05 RX ORDER — ONDANSETRON 2 MG/ML
8 INJECTION INTRAMUSCULAR; INTRAVENOUS
Status: CANCELLED | OUTPATIENT
Start: 2025-05-06

## 2025-05-05 RX ORDER — SODIUM CHLORIDE 9 MG/ML
INJECTION, SOLUTION INTRAVENOUS CONTINUOUS
Status: CANCELLED | OUTPATIENT
Start: 2025-05-06

## 2025-05-05 RX ORDER — HEPARIN 100 UNIT/ML
500 SYRINGE INTRAVENOUS PRN
Status: CANCELLED | OUTPATIENT
Start: 2025-05-06

## 2025-05-05 RX ORDER — ALBUTEROL SULFATE 90 UG/1
4 INHALANT RESPIRATORY (INHALATION) PRN
Status: CANCELLED | OUTPATIENT
Start: 2025-05-06

## 2025-05-05 RX ADMIN — Medication 10 ML: at 11:00

## 2025-05-05 RX ADMIN — Medication 20 ML: at 11:14

## 2025-05-05 RX ADMIN — SODIUM CHLORIDE 1000 MG: 9 INJECTION INTRAMUSCULAR; INTRAVENOUS; SUBCUTANEOUS at 11:01

## 2025-05-05 RX ADMIN — Medication 20 ML: at 11:12

## 2025-05-05 NOTE — PROGRESS NOTES
Outpatient Infusion Center  [facility]    IV Antibiotic Visit    NAME:  Stacie Donato  YOB: 1980  MEDICAL RECORD NUMBER:  [id number]  DATE:  5/5/2025    Patient arrived to Outpatient Infusion Center   [] per wheelchair   [x] ambulatory     Alert and oriented X 4. Denies side effects from last infusion. Denies new s/s of infection or respiratory s/s. No left sided pain.     Itching: No  Rash: No  Mouth Sores: No  Nausea: No  Vomiting: No  Diarrhea: No  Night Sweats: No  Fever: No    Administered: [] Peripheral access    [x] PICC access    [] Port access    No Known Allergies    Name of Antibiotic Administered: Invanz  Dose of Antibiotic Administered: 1000 mg.    Indication for Antibiotic: Empyema left lung      Response to treatment:  Well tolerated by patient.       Scheduled to return for next antibiotic dose tomorrow. Also scheduled for CT scan, will draw labs tomorrow due to has F/U MD appointment on Wednesday 5/6.      Electronically signed by YARIEL VIVEROS RN on 5/5/2025 at 11:07 AM

## 2025-05-05 NOTE — DISCHARGE INSTRUCTIONS
Outpatient Infusion Discharge Instructions  [facility]  [address] [address]  Telephone: [phone]      FAX [phone]    NAME:  Stacie Donato  YOB: 1980  MEDICAL RECORD NUMBER:  [id number]  DATE:  5/5/25    Reason for Outpatient Infusion Visit: Daily IV Invanz     If you develop any these symptoms please contact you Doctor    [] Nausea and/or vomiting not relieved with medication   [x] Swelling, redness, and/or bleeding at injection or IV site    [] Fever or chills  [x] Rash or itching   [] Shortness of breath  [x] Please review After Visit Summary (AVS) information on Invanz   [] Other     * Daily antibiotic, reviewed side effects and when to call MD. Declined printed AVS.    Outpatient Infusion Center Information: Should you experience any significant changes in your health or have questions about your care please contact the [facility] at [phone] MONDAY - FRIDAY 8:00 am - 4:00 pm.  If you need help outside these hours and cannot wait until we are again available, contact your Primary Care Physician or go to the hospital emergency room.       Electronically signed by YARIEL VIVEROS RN on 5/5/2025 at 11:09 AM

## 2025-05-06 ENCOUNTER — HOSPITAL ENCOUNTER (OUTPATIENT)
Dept: INFUSION THERAPY | Age: 45
Setting detail: INFUSION SERIES
Discharge: HOME | End: 2025-05-06
Payer: COMMERCIAL

## 2025-05-06 ENCOUNTER — RESULTS FOLLOW-UP (OUTPATIENT)
Dept: INFECTIOUS DISEASES | Age: 45
End: 2025-05-06

## 2025-05-06 ENCOUNTER — HOSPITAL ENCOUNTER (OUTPATIENT)
Dept: CT IMAGING | Age: 45
Discharge: HOME | End: 2025-05-06
Attending: INTERNAL MEDICINE
Payer: COMMERCIAL

## 2025-05-06 ENCOUNTER — TELEPHONE (OUTPATIENT)
Dept: INFECTIOUS DISEASES | Age: 45
End: 2025-05-06

## 2025-05-06 VITALS
DIASTOLIC BLOOD PRESSURE: 92 MMHG | TEMPERATURE: 98.1 F | SYSTOLIC BLOOD PRESSURE: 142 MMHG | HEART RATE: 77 BPM | OXYGEN SATURATION: 97 % | RESPIRATION RATE: 16 BRPM

## 2025-05-06 DIAGNOSIS — R07.9 LEFT-SIDED CHEST PAIN: ICD-10-CM

## 2025-05-06 DIAGNOSIS — R93.89 ABNORMAL CT OF THE CHEST: ICD-10-CM

## 2025-05-06 DIAGNOSIS — J90 PLEURAL EFFUSION ON LEFT: ICD-10-CM

## 2025-05-06 DIAGNOSIS — J86.9 EMPYEMA (HCC): ICD-10-CM

## 2025-05-06 DIAGNOSIS — J90 RECURRENT LEFT PLEURAL EFFUSION: ICD-10-CM

## 2025-05-06 DIAGNOSIS — J86.9 EMPYEMA (HCC): Primary | ICD-10-CM

## 2025-05-06 LAB
ACID FAST STN SPEC QL: NORMAL
ACID FAST STN SPEC QL: NORMAL
ALBUMIN SERPL-MCNC: 3.7 G/DL (ref 3.4–5)
ALBUMIN/GLOB SERPL: 1.1 {RATIO} (ref 1.1–2.2)
ALP SERPL-CCNC: 89 U/L (ref 40–129)
ALT SERPL-CCNC: 15 U/L (ref 10–40)
ANION GAP SERPL CALCULATED.3IONS-SCNC: 11 MMOL/L (ref 3–16)
AST SERPL-CCNC: 20 U/L (ref 15–37)
BASOPHILS # BLD: 0.1 K/UL (ref 0–0.2)
BASOPHILS NFR BLD: 1.1 %
BILIRUB SERPL-MCNC: 0.4 MG/DL (ref 0–1)
BUN SERPL-MCNC: 8 MG/DL (ref 7–20)
CALCIUM SERPL-MCNC: 9.3 MG/DL (ref 8.3–10.6)
CHLORIDE SERPL-SCNC: 102 MMOL/L (ref 99–110)
CO2 SERPL-SCNC: 24 MMOL/L (ref 21–32)
CREAT SERPL-MCNC: 0.5 MG/DL (ref 0.6–1.1)
CRP SERPL-MCNC: 5.1 MG/L (ref 0–5.1)
DEPRECATED RDW RBC AUTO: 14.3 % (ref 12.4–15.4)
EOSINOPHIL # BLD: 0.4 K/UL (ref 0–0.6)
EOSINOPHIL NFR BLD: 5.7 %
ERYTHROCYTE [SEDIMENTATION RATE] IN BLOOD BY WESTERGREN METHOD: 50 MM/HR (ref 0–20)
GFR SERPLBLD CREATININE-BSD FMLA CKD-EPI: >90 ML/MIN/{1.73_M2}
GLUCOSE SERPL-MCNC: 155 MG/DL (ref 70–99)
HCT VFR BLD AUTO: 36 % (ref 36–48)
HGB BLD-MCNC: 12.2 G/DL (ref 12–16)
LYMPHOCYTES # BLD: 1.9 K/UL (ref 1–5.1)
LYMPHOCYTES NFR BLD: 25.7 %
MCH RBC QN AUTO: 30.6 PG (ref 26–34)
MCHC RBC AUTO-ENTMCNC: 34 G/DL (ref 31–36)
MCV RBC AUTO: 90.1 FL (ref 80–100)
MONOCYTES # BLD: 0.4 K/UL (ref 0–1.3)
MONOCYTES NFR BLD: 5.7 %
MYCOBACTERIUM SPEC CULT: NORMAL
MYCOBACTERIUM SPEC CULT: NORMAL
NEUTROPHILS # BLD: 4.6 K/UL (ref 1.7–7.7)
NEUTROPHILS NFR BLD: 61.8 %
PLATELET # BLD AUTO: 313 K/UL (ref 135–450)
PMV BLD AUTO: 8 FL (ref 5–10.5)
POTASSIUM SERPL-SCNC: 3.9 MMOL/L (ref 3.5–5.1)
PROT SERPL-MCNC: 7 G/DL (ref 6.4–8.2)
RBC # BLD AUTO: 4 M/UL (ref 4–5.2)
SODIUM SERPL-SCNC: 137 MMOL/L (ref 136–145)
WBC # BLD AUTO: 7.4 K/UL (ref 4–11)

## 2025-05-06 PROCEDURE — 2580000003 HC RX 258: Performed by: INTERNAL MEDICINE

## 2025-05-06 PROCEDURE — 99212 OFFICE O/P EST SF 10 MIN: CPT

## 2025-05-06 PROCEDURE — 80053 COMPREHEN METABOLIC PANEL: CPT

## 2025-05-06 PROCEDURE — 2500000003 HC RX 250 WO HCPCS: Performed by: INTERNAL MEDICINE

## 2025-05-06 PROCEDURE — 71250 CT THORAX DX C-: CPT

## 2025-05-06 PROCEDURE — 86140 C-REACTIVE PROTEIN: CPT

## 2025-05-06 PROCEDURE — 85025 COMPLETE CBC W/AUTO DIFF WBC: CPT

## 2025-05-06 PROCEDURE — 85652 RBC SED RATE AUTOMATED: CPT

## 2025-05-06 PROCEDURE — 96365 THER/PROPH/DIAG IV INF INIT: CPT

## 2025-05-06 PROCEDURE — 6360000002 HC RX W HCPCS: Performed by: INTERNAL MEDICINE

## 2025-05-06 RX ORDER — ALBUTEROL SULFATE 90 UG/1
4 INHALANT RESPIRATORY (INHALATION) PRN
Status: CANCELLED | OUTPATIENT
Start: 2025-05-07

## 2025-05-06 RX ORDER — SODIUM CHLORIDE 0.9 % (FLUSH) 0.9 %
5-40 SYRINGE (ML) INJECTION PRN
Status: CANCELLED | OUTPATIENT
Start: 2025-05-07

## 2025-05-06 RX ORDER — HYDROCORTISONE SODIUM SUCCINATE 100 MG/2ML
100 INJECTION INTRAMUSCULAR; INTRAVENOUS
Status: CANCELLED | OUTPATIENT
Start: 2025-05-07

## 2025-05-06 RX ORDER — DIPHENHYDRAMINE HYDROCHLORIDE 50 MG/ML
50 INJECTION, SOLUTION INTRAMUSCULAR; INTRAVENOUS
Status: CANCELLED | OUTPATIENT
Start: 2025-05-07

## 2025-05-06 RX ORDER — ONDANSETRON 2 MG/ML
8 INJECTION INTRAMUSCULAR; INTRAVENOUS
Status: CANCELLED | OUTPATIENT
Start: 2025-05-07

## 2025-05-06 RX ORDER — ACETAMINOPHEN 325 MG/1
650 TABLET ORAL
Status: CANCELLED | OUTPATIENT
Start: 2025-05-07

## 2025-05-06 RX ORDER — SODIUM CHLORIDE 0.9 % (FLUSH) 0.9 %
5-40 SYRINGE (ML) INJECTION PRN
Status: DISCONTINUED | OUTPATIENT
Start: 2025-05-06 | End: 2025-05-07 | Stop reason: HOSPADM

## 2025-05-06 RX ORDER — HEPARIN 100 UNIT/ML
500 SYRINGE INTRAVENOUS PRN
Status: CANCELLED | OUTPATIENT
Start: 2025-05-07

## 2025-05-06 RX ORDER — SODIUM CHLORIDE 9 MG/ML
INJECTION, SOLUTION INTRAVENOUS CONTINUOUS
Status: CANCELLED | OUTPATIENT
Start: 2025-05-07

## 2025-05-06 RX ORDER — EPINEPHRINE 1 MG/ML
0.3 INJECTION, SOLUTION, CONCENTRATE INTRAVENOUS PRN
Status: CANCELLED | OUTPATIENT
Start: 2025-05-07

## 2025-05-06 RX ORDER — SODIUM CHLORIDE 9 MG/ML
5-250 INJECTION, SOLUTION INTRAVENOUS PRN
Status: CANCELLED | OUTPATIENT
Start: 2025-05-07

## 2025-05-06 RX ADMIN — Medication 20 ML: at 11:11

## 2025-05-06 RX ADMIN — SODIUM CHLORIDE 1000 MG: 9 INJECTION INTRAMUSCULAR; INTRAVENOUS; SUBCUTANEOUS at 11:21

## 2025-05-06 RX ADMIN — Medication 20 ML: at 11:10

## 2025-05-06 RX ADMIN — Medication 10 ML: at 11:37

## 2025-05-06 NOTE — PROGRESS NOTES
Outpatient Infusion Center   [facility]    PICC Dressing Change    NAME:  Stacie Donato  YOB: 1980  MEDICAL RECORD NUMBER:  [id number]  DATE:  5/6/2025    Patient arrived to Outpatient Infusion Center   [] per wheelchair   [x] ambulatory     PICC Location:left arm    PICC Site:  Redness: No  Bruising: No   Edema: No  Pain: No     PICC Cleansed:  Current dressing and stat lock removed: Yes  Chloroprep Scrub for 30 seconds and air dried completely for 1 minute: Yes     PICC Dressing Change  Skin barrier: Yes  Stat lock applied and PICC line secured Yes  Biopatch applied: Yes chg gel  PICC site covered with Tegaderm Yes  Date and initials applied to PICC dressing Yes  [] Other:    Next Dressing Due: May 13, 2025.     Response to treatment:  Well tolerated by patient.      Electronically signed by Selena Gibbons RN on 5/6/2025 at 11:37 [facility]    PICC Lab Draw    NAME:  Stacie Donato  YOB: 1980  MEDICAL RECORD NUMBER:  [id number]  DATE:  5/6/2025    Patient arrived to Outpatient Infusion Center   [] per wheelchair   [] ambulatory     PICC Location:left arm    PICC Site:  Redness: No  Bruising: No   Edema: No  Pain: No     PICC Labs  Cap cleansed with Chloroprep Scrub for 30 seconds and air dried completely for 1 minute on sterile 4X4: Yes  Blood drawn using a vaccutainer  Amount of blood wasted 10/ml syringe    Labs Obtained: Yes  Lab Test(s) Ordered: cbc, cmp, sed rate, crp  PICC Flushed with 20ml/NS: Yes  New Cap applied: Yes     Response to treatment:  Well tolerated by patient.      Electronically signed by Selena Gibbons RN on 5/6/2025 at 11:37 AM            Outpatient Infusion Center  [facility]    IV Antibiotic Visit    NAME:  Stacie Donato  YOB: 1980  MEDICAL RECORD NUMBER:  [id number]  DATE:  5/6/2025    Patient arrived to Outpatient Infusion Center   [] per wheelchair

## 2025-05-06 NOTE — RESULT ENCOUNTER NOTE
Ct CHEST improving and fluid almost resolving would like to extend her IV Ertapenem till x 5/16 and then d/c IV abx

## 2025-05-07 ENCOUNTER — HOSPITAL ENCOUNTER (OUTPATIENT)
Dept: INFUSION THERAPY | Age: 45
Setting detail: INFUSION SERIES
Discharge: HOME | End: 2025-05-07
Payer: COMMERCIAL

## 2025-05-07 ENCOUNTER — OFFICE VISIT (OUTPATIENT)
Dept: INFECTIOUS DISEASES | Age: 45
End: 2025-05-07
Payer: COMMERCIAL

## 2025-05-07 VITALS
HEART RATE: 84 BPM | OXYGEN SATURATION: 94 % | HEIGHT: 60 IN | TEMPERATURE: 97.8 F | BODY MASS INDEX: 45.39 KG/M2 | WEIGHT: 231.2 LBS | SYSTOLIC BLOOD PRESSURE: 138 MMHG | DIASTOLIC BLOOD PRESSURE: 91 MMHG

## 2025-05-07 VITALS
HEART RATE: 78 BPM | TEMPERATURE: 97.7 F | DIASTOLIC BLOOD PRESSURE: 83 MMHG | OXYGEN SATURATION: 97 % | RESPIRATION RATE: 16 BRPM | SYSTOLIC BLOOD PRESSURE: 132 MMHG

## 2025-05-07 DIAGNOSIS — R70.0 ELEVATED ERYTHROCYTE SEDIMENTATION RATE: ICD-10-CM

## 2025-05-07 DIAGNOSIS — J90 RECURRENT LEFT PLEURAL EFFUSION: ICD-10-CM

## 2025-05-07 DIAGNOSIS — J86.9 EMPYEMA (HCC): Primary | ICD-10-CM

## 2025-05-07 DIAGNOSIS — R93.89 ABNORMAL CT OF THE CHEST: ICD-10-CM

## 2025-05-07 DIAGNOSIS — J90 PLEURAL EFFUSION ON LEFT: ICD-10-CM

## 2025-05-07 DIAGNOSIS — J90 PLEURISY WITH EFFUSION: ICD-10-CM

## 2025-05-07 DIAGNOSIS — E66.01 MORBID OBESITY WITH BMI OF 45.0-49.9, ADULT (HCC): ICD-10-CM

## 2025-05-07 DIAGNOSIS — R91.8 PULMONARY NODULES: ICD-10-CM

## 2025-05-07 DIAGNOSIS — A49.1 GROUP B STREPTOCOCCAL INFECTION: ICD-10-CM

## 2025-05-07 DIAGNOSIS — R07.9 LEFT-SIDED CHEST PAIN: ICD-10-CM

## 2025-05-07 DIAGNOSIS — R79.82 CRP ELEVATED: ICD-10-CM

## 2025-05-07 DIAGNOSIS — E11.65 POORLY CONTROLLED DIABETES MELLITUS (HCC): ICD-10-CM

## 2025-05-07 PROCEDURE — 6360000002 HC RX W HCPCS: Performed by: INTERNAL MEDICINE

## 2025-05-07 PROCEDURE — 2580000003 HC RX 258: Performed by: INTERNAL MEDICINE

## 2025-05-07 PROCEDURE — 3046F HEMOGLOBIN A1C LEVEL >9.0%: CPT | Performed by: INTERNAL MEDICINE

## 2025-05-07 PROCEDURE — 2500000003 HC RX 250 WO HCPCS: Performed by: INTERNAL MEDICINE

## 2025-05-07 PROCEDURE — 96374 THER/PROPH/DIAG INJ IV PUSH: CPT

## 2025-05-07 PROCEDURE — 99214 OFFICE O/P EST MOD 30 MIN: CPT | Performed by: INTERNAL MEDICINE

## 2025-05-07 RX ORDER — HEPARIN 100 UNIT/ML
500 SYRINGE INTRAVENOUS PRN
Status: CANCELLED | OUTPATIENT
Start: 2025-05-08

## 2025-05-07 RX ORDER — EPINEPHRINE 1 MG/ML
0.3 INJECTION, SOLUTION, CONCENTRATE INTRAVENOUS PRN
Status: CANCELLED | OUTPATIENT
Start: 2025-05-08

## 2025-05-07 RX ORDER — SODIUM CHLORIDE 0.9 % (FLUSH) 0.9 %
5-40 SYRINGE (ML) INJECTION PRN
Status: CANCELLED | OUTPATIENT
Start: 2025-05-08

## 2025-05-07 RX ORDER — SODIUM CHLORIDE 9 MG/ML
5-250 INJECTION, SOLUTION INTRAVENOUS PRN
Status: CANCELLED | OUTPATIENT
Start: 2025-05-08

## 2025-05-07 RX ORDER — LEVOFLOXACIN 750 MG/1
750 TABLET, FILM COATED ORAL DAILY
Qty: 7 TABLET | Refills: 0 | Status: SHIPPED | OUTPATIENT
Start: 2025-05-07 | End: 2025-05-14

## 2025-05-07 RX ORDER — SODIUM CHLORIDE 0.9 % (FLUSH) 0.9 %
5-40 SYRINGE (ML) INJECTION PRN
Status: DISCONTINUED | OUTPATIENT
Start: 2025-05-07 | End: 2025-05-08 | Stop reason: HOSPADM

## 2025-05-07 RX ORDER — ACETAMINOPHEN 325 MG/1
650 TABLET ORAL
Status: CANCELLED | OUTPATIENT
Start: 2025-05-08

## 2025-05-07 RX ORDER — DIPHENHYDRAMINE HYDROCHLORIDE 50 MG/ML
50 INJECTION, SOLUTION INTRAMUSCULAR; INTRAVENOUS
Status: CANCELLED | OUTPATIENT
Start: 2025-05-08

## 2025-05-07 RX ORDER — HYDROCORTISONE SODIUM SUCCINATE 100 MG/2ML
100 INJECTION INTRAMUSCULAR; INTRAVENOUS
Status: CANCELLED | OUTPATIENT
Start: 2025-05-08

## 2025-05-07 RX ORDER — ONDANSETRON 2 MG/ML
8 INJECTION INTRAMUSCULAR; INTRAVENOUS
Status: CANCELLED | OUTPATIENT
Start: 2025-05-08

## 2025-05-07 RX ORDER — ALBUTEROL SULFATE 90 UG/1
4 INHALANT RESPIRATORY (INHALATION) PRN
Status: CANCELLED | OUTPATIENT
Start: 2025-05-08

## 2025-05-07 RX ORDER — SODIUM CHLORIDE 9 MG/ML
INJECTION, SOLUTION INTRAVENOUS CONTINUOUS
Status: CANCELLED | OUTPATIENT
Start: 2025-05-08

## 2025-05-07 RX ADMIN — SODIUM CHLORIDE, PRESERVATIVE FREE 10 ML: 5 INJECTION INTRAVENOUS at 11:00

## 2025-05-07 RX ADMIN — SODIUM CHLORIDE 1000 MG: 9 INJECTION INTRAMUSCULAR; INTRAVENOUS; SUBCUTANEOUS at 11:01

## 2025-05-07 RX ADMIN — SODIUM CHLORIDE, PRESERVATIVE FREE 30 ML: 5 INJECTION INTRAVENOUS at 11:14

## 2025-05-07 RX ADMIN — SODIUM CHLORIDE, PRESERVATIVE FREE 30 ML: 5 INJECTION INTRAVENOUS at 10:57

## 2025-05-07 ASSESSMENT — PAIN SCALES - GENERAL: PAINLEVEL_OUTOF10: 0

## 2025-05-07 NOTE — PROGRESS NOTES
Infectious Diseases Outpatient Follow-up Note       Primary Care Physician:  Samuel Sy MD       History Obtained From:   Patient, EPIC    CHIEF COMPLAINT / ID problem:    Chief Complaint   Patient presents with    Follow-up     Left lower lobe pneumonia, Empyema -nk       HISTORY OF PRESENT ILLNESS / Interval history:  44 y.o. female with significant history for asthma, fatty liver, obesity BMI 47, psoriasis, diabetes, poor control was recently admitted to [facility] from 3/7/25 to  3/18/25, prolonged hospitalization secondary to pneumonia left pleural effusion exudative effusion requiring thoracentesis on previous admission she was immunosuppressed while taking biologic for her psoriatic arthritis but she has been off treatment since January 2025.  She does have poor dentition.  She was d/c  on oral Augmentin for 3 weeks course for pneumonia and then left exudative pleural effusion.  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  Respiratory culture on previous admission group B strep pneumonia molecular panel group B strep, TB QuantiFERON negative, pleural fluid cultures negative including AFB and fungal.        Patient now readmitted to hospital secondary to ongoing left-sided chest pain associated with sweating fever shortness of breath not feeling well.  CT chest indicated left lower lobe effusion with consolidation patient underwent chest tube placement pleural fluid analysis indicating empyema pH at 7, LDH greater than 2500, neutrophils greater than 50,000, 93% neutrophil consistent with empyema pleural fluid culture Gram stain gram-positive cocci in chains resembling streptococci but cx grew out coagulase negative staph which was likely contaminant.  Patient continued on IV antibiotic she was discharged home with a PICC line in place.  Patient has been coming to infusion center receiving IV ertapenem 1 today.  Her respiratory symptoms significantly improved no chest pain no sweats no

## 2025-05-07 NOTE — PROGRESS NOTES
Outpatient Infusion Center  [facility]    IV Antibiotic Visit    NAME:  Stacie Donato  YOB: 1980  MEDICAL RECORD NUMBER:  [id number]  DATE:  5/7/2025    Patient arrived to Outpatient Infusion Center   [] per wheelchair   [x] ambulatory     Alert and oriented x 4. Patient here to receive Invanz IVP for the treatment of left pleural effusion/empyema. Patient had a follow up CT scan yesterday and has a follow up appointment with Dr Hernandez today. Last dose of antibiotics is tentatively scheduled for May 9, 2025. Patient reports feeling well and denies respiratory issues.    Itching: No  Rash: No  Mouth Sores: No  Nausea: No  Vomiting: No  Diarrhea: No  Night Sweats: No  Fever: No    Administered: [] Peripheral access    [x] PICC access - dual lumen, left basilic   [] Port access    No Known Allergies    Name of Antibiotic Administered: Invanz  Dose of Antibiotic Administered: 1000 mg.    Indication for Antibiotic: left pleural effusion/empyema      Response to treatment:  Well tolerated by patient.     Follow up: Scheduled to return for next antibiotic dose tomorrow.     Electronically signed by Sheila Cerrato RN on 5/7/2025 at 4:14 PM

## 2025-05-08 ENCOUNTER — HOSPITAL ENCOUNTER (OUTPATIENT)
Dept: INFUSION THERAPY | Age: 45
Setting detail: INFUSION SERIES
Discharge: HOME | End: 2025-05-08
Payer: COMMERCIAL

## 2025-05-08 VITALS
TEMPERATURE: 97.9 F | HEART RATE: 79 BPM | DIASTOLIC BLOOD PRESSURE: 90 MMHG | RESPIRATION RATE: 16 BRPM | SYSTOLIC BLOOD PRESSURE: 136 MMHG | OXYGEN SATURATION: 98 %

## 2025-05-08 DIAGNOSIS — J90 PLEURAL EFFUSION ON LEFT: ICD-10-CM

## 2025-05-08 DIAGNOSIS — J90 RECURRENT LEFT PLEURAL EFFUSION: ICD-10-CM

## 2025-05-08 DIAGNOSIS — J86.9 EMPYEMA (HCC): Primary | ICD-10-CM

## 2025-05-08 PROCEDURE — 2500000003 HC RX 250 WO HCPCS: Performed by: INTERNAL MEDICINE

## 2025-05-08 PROCEDURE — 2580000003 HC RX 258: Performed by: INTERNAL MEDICINE

## 2025-05-08 PROCEDURE — 6360000002 HC RX W HCPCS: Performed by: INTERNAL MEDICINE

## 2025-05-08 PROCEDURE — 96374 THER/PROPH/DIAG INJ IV PUSH: CPT

## 2025-05-08 RX ORDER — HEPARIN 100 UNIT/ML
500 SYRINGE INTRAVENOUS PRN
Status: CANCELLED | OUTPATIENT
Start: 2025-05-09

## 2025-05-08 RX ORDER — SODIUM CHLORIDE 9 MG/ML
5-250 INJECTION, SOLUTION INTRAVENOUS PRN
Status: CANCELLED | OUTPATIENT
Start: 2025-05-09

## 2025-05-08 RX ORDER — EPINEPHRINE 1 MG/ML
0.3 INJECTION, SOLUTION, CONCENTRATE INTRAVENOUS PRN
Status: CANCELLED | OUTPATIENT
Start: 2025-05-09

## 2025-05-08 RX ORDER — HYDROCORTISONE SODIUM SUCCINATE 100 MG/2ML
100 INJECTION INTRAMUSCULAR; INTRAVENOUS
Status: CANCELLED | OUTPATIENT
Start: 2025-05-09

## 2025-05-08 RX ORDER — SODIUM CHLORIDE 0.9 % (FLUSH) 0.9 %
5-40 SYRINGE (ML) INJECTION PRN
Status: CANCELLED | OUTPATIENT
Start: 2025-05-09

## 2025-05-08 RX ORDER — ALBUTEROL SULFATE 90 UG/1
4 INHALANT RESPIRATORY (INHALATION) PRN
Status: CANCELLED | OUTPATIENT
Start: 2025-05-09

## 2025-05-08 RX ORDER — DIPHENHYDRAMINE HYDROCHLORIDE 50 MG/ML
50 INJECTION, SOLUTION INTRAMUSCULAR; INTRAVENOUS
Status: CANCELLED | OUTPATIENT
Start: 2025-05-09

## 2025-05-08 RX ORDER — ACETAMINOPHEN 325 MG/1
650 TABLET ORAL
Status: CANCELLED | OUTPATIENT
Start: 2025-05-09

## 2025-05-08 RX ORDER — SODIUM CHLORIDE 9 MG/ML
INJECTION, SOLUTION INTRAVENOUS CONTINUOUS
Status: CANCELLED | OUTPATIENT
Start: 2025-05-09

## 2025-05-08 RX ORDER — SODIUM CHLORIDE 0.9 % (FLUSH) 0.9 %
5-40 SYRINGE (ML) INJECTION PRN
Status: DISCONTINUED | OUTPATIENT
Start: 2025-05-08 | End: 2025-05-09 | Stop reason: HOSPADM

## 2025-05-08 RX ORDER — ONDANSETRON 2 MG/ML
8 INJECTION INTRAMUSCULAR; INTRAVENOUS
Status: CANCELLED | OUTPATIENT
Start: 2025-05-09

## 2025-05-08 RX ADMIN — Medication 10 ML: at 11:04

## 2025-05-08 RX ADMIN — SODIUM CHLORIDE 1000 MG: 9 INJECTION INTRAMUSCULAR; INTRAVENOUS; SUBCUTANEOUS at 10:54

## 2025-05-08 RX ADMIN — Medication 10 ML: at 10:57

## 2025-05-08 NOTE — PROGRESS NOTES
Outpatient Infusion Center  [facility]    IV Antibiotic Visit    NAME:  Stacie Donato  YOB: 1980  MEDICAL RECORD NUMBER:  [id number]  DATE:  5/8/2025    Patient arrived to Outpatient Infusion Center   [] per wheelchair   [x] ambulatory     Itching: No  Rash: No  Mouth Sores: No  Nausea: No  Vomiting: No  Diarrhea: No  Night Sweats: No  Fever: No    Administered: [] Peripheral access    [x] PICC access    [] Port access    No Known Allergies    Name of Antibiotic Administered: Invanz  Dose of Antibiotic Administered: 1000 mg.    Indication for Antibiotic: Empyema      Response to treatment:  Well tolerated by patient.       Scheduled to return for next antibiotic dose tomorrow last dose, then DC PICC     Electronically signed by Maranda De La Vega RN on 5/8/2025 at 11:05 AM

## 2025-05-09 ENCOUNTER — HOSPITAL ENCOUNTER (OUTPATIENT)
Dept: INFUSION THERAPY | Age: 45
Setting detail: INFUSION SERIES
Discharge: HOME | End: 2025-05-09
Payer: COMMERCIAL

## 2025-05-09 VITALS
OXYGEN SATURATION: 97 % | DIASTOLIC BLOOD PRESSURE: 89 MMHG | TEMPERATURE: 97.9 F | RESPIRATION RATE: 16 BRPM | SYSTOLIC BLOOD PRESSURE: 138 MMHG | HEART RATE: 73 BPM

## 2025-05-09 DIAGNOSIS — J90 RECURRENT LEFT PLEURAL EFFUSION: ICD-10-CM

## 2025-05-09 DIAGNOSIS — J86.9 EMPYEMA (HCC): Primary | ICD-10-CM

## 2025-05-09 DIAGNOSIS — J90 PLEURAL EFFUSION ON LEFT: ICD-10-CM

## 2025-05-09 PROCEDURE — 99213 OFFICE O/P EST LOW 20 MIN: CPT

## 2025-05-09 PROCEDURE — 96374 THER/PROPH/DIAG INJ IV PUSH: CPT

## 2025-05-09 PROCEDURE — 2500000003 HC RX 250 WO HCPCS: Performed by: INTERNAL MEDICINE

## 2025-05-09 PROCEDURE — 2580000003 HC RX 258: Performed by: INTERNAL MEDICINE

## 2025-05-09 PROCEDURE — 6360000002 HC RX W HCPCS: Performed by: INTERNAL MEDICINE

## 2025-05-09 RX ORDER — EPINEPHRINE 1 MG/ML
0.3 INJECTION, SOLUTION, CONCENTRATE INTRAVENOUS PRN
Status: CANCELLED | OUTPATIENT
Start: 2025-05-10

## 2025-05-09 RX ORDER — HEPARIN 100 UNIT/ML
500 SYRINGE INTRAVENOUS PRN
Status: CANCELLED | OUTPATIENT
Start: 2025-05-10

## 2025-05-09 RX ORDER — ONDANSETRON 2 MG/ML
8 INJECTION INTRAMUSCULAR; INTRAVENOUS
Status: CANCELLED | OUTPATIENT
Start: 2025-05-10

## 2025-05-09 RX ORDER — SODIUM CHLORIDE 0.9 % (FLUSH) 0.9 %
5-40 SYRINGE (ML) INJECTION PRN
Status: CANCELLED | OUTPATIENT
Start: 2025-05-10

## 2025-05-09 RX ORDER — DIPHENHYDRAMINE HYDROCHLORIDE 50 MG/ML
50 INJECTION, SOLUTION INTRAMUSCULAR; INTRAVENOUS
Status: CANCELLED | OUTPATIENT
Start: 2025-05-10

## 2025-05-09 RX ORDER — SODIUM CHLORIDE 9 MG/ML
INJECTION, SOLUTION INTRAVENOUS CONTINUOUS
Status: CANCELLED | OUTPATIENT
Start: 2025-05-10

## 2025-05-09 RX ORDER — ALBUTEROL SULFATE 90 UG/1
4 INHALANT RESPIRATORY (INHALATION) PRN
Status: CANCELLED | OUTPATIENT
Start: 2025-05-10

## 2025-05-09 RX ORDER — ACETAMINOPHEN 325 MG/1
650 TABLET ORAL
Status: CANCELLED | OUTPATIENT
Start: 2025-05-10

## 2025-05-09 RX ORDER — SODIUM CHLORIDE 9 MG/ML
5-250 INJECTION, SOLUTION INTRAVENOUS PRN
Status: CANCELLED | OUTPATIENT
Start: 2025-05-10

## 2025-05-09 RX ORDER — HYDROCORTISONE SODIUM SUCCINATE 100 MG/2ML
100 INJECTION INTRAMUSCULAR; INTRAVENOUS
Status: CANCELLED | OUTPATIENT
Start: 2025-05-10

## 2025-05-09 RX ORDER — SODIUM CHLORIDE 0.9 % (FLUSH) 0.9 %
5-40 SYRINGE (ML) INJECTION PRN
Status: DISCONTINUED | OUTPATIENT
Start: 2025-05-09 | End: 2025-05-10 | Stop reason: HOSPADM

## 2025-05-09 RX ADMIN — SODIUM CHLORIDE, PRESERVATIVE FREE 10 ML: 5 INJECTION INTRAVENOUS at 11:09

## 2025-05-09 RX ADMIN — SODIUM CHLORIDE, PRESERVATIVE FREE 10 ML: 5 INJECTION INTRAVENOUS at 10:59

## 2025-05-09 RX ADMIN — SODIUM CHLORIDE 1000 MG: 9 INJECTION INTRAMUSCULAR; INTRAVENOUS; SUBCUTANEOUS at 11:00

## 2025-05-09 ASSESSMENT — PAIN SCALES - GENERAL: PAINLEVEL_OUTOF10: 0

## 2025-05-09 NOTE — DISCHARGE INSTRUCTIONS
Outpatient Infusion Discharge Instructions  [facility]  [address] [facility] [address] [place], [place] [address]  Telephone: [phone]      FAX [phone]    NAME:  Stacie Donato  YOB: 1980  MEDICAL RECORD NUMBER:  [id number]  DATE:   5/9/25    Reason for Outpatient Infusion Visit: IV antibiotic and PICC removal    If you develop any these symptoms please contact you Doctor    [x] Nausea and/or vomiting not relieved with medication   [x] Swelling, redness, and/or bleeding at injection or IV site    [x] Fever or chills  [x] Rash or itching   [x] Shortness of breath  [x] Please review After Visit Summary (AVS) information on    [] Other Leave dressing on over PICC line site for 48 hours.      Outpatient Infusion Center Information: Should you experience any significant changes in your health or have questions about your care please contact the [facility] at [phone] MONDAY - FRIDAY 8:00 am - 4:00 pm.  If you need help outside these hours and cannot wait until we are again available, contact your Primary Care Physician or go to the hospital emergency room.       Electronically signed by Frances Arellano RN on 5/9/2025 at 11:09 AM

## 2025-05-09 NOTE — PROGRESS NOTES
Outpatient Infusion Center  [facility]    IV Antibiotic Visit    NAME:  Stacie Donato  YOB: 1980  MEDICAL RECORD NUMBER:  [id number]  DATE:  5/9/2025    Patient arrived to Outpatient Infusion Center   [] per wheelchair   [x] ambulatory     Itching: No  Rash: No  Mouth Sores: No  Nausea: No  Vomiting: No  Diarrhea: No  Night Sweats: No  Fever: No    Administered: [] Peripheral access    [x] PICC access    [] Port access    No Known Allergies    Name of Antibiotic Administered: Invanz  Dose of Antibiotic Administered: 1000 mg.    Indication for Antibiotic: Lt. Sided empyema and pl. effusion      Response to treatment:  Well tolerated by patient.       This is her last arceo. Will start on oral vanco tomorrow.     Electronically signed by Frances Arellano RN on 5/9/2025 at 11:06 AM      Outpatient Infusion Center   [facility]    PICC Line Removal    NAME:  Stacie Donato  YOB: 1980  MEDICAL RECORD NUMBER:  [id number]  DATE:  5/9/2025    Patient arrived to Outpatient Infusion Center   [] per wheelchair   [x] ambulatory     PICC Location:  left arm    Patient has been treated for:  infection ( disease or infection )    Patient has completed treatment of: 30 days of treatment     Order for removal of PICC line given by:  Dr Hernandez    PICC placed on:   April 9,2025.  PICC cut to 48 cm.    Patient positioned:   [] Lying   [x] Sitting   [] Other         PICC dressing removed:  Yes     New pair of sterile gloves donned and sterile 4 x 4 gauze placed under PICC line:  Yes    Keeping a sterile 4x4 over top of exit site, PICC removed slowly by small increments and line not stretched.      PICC line removed without any complications:  Yes    Pressure held to exit site for 30 - 60 seconds:  Yes    Bleeding at site:  no    Site scrubbed with Chloroprep Scrub for 30 seconds and air dried completely for 1 minute.  Yes  Small piece of vaseline gauze placed over exit site

## 2025-05-09 NOTE — PROGRESS NOTES
Clinic Level of Care Assessment  Infusion Center      NAME:  Stacie Donato  YOB: 1980 GENDER: female  MEDICAL RECORD NUMBER:  [id number]   DATE:  5/9/2025     Ambulation Status Document in Daily Care/Safety Tab   Status Definition Points   Independent Independently able to ambulate.  Fully able (without any assistance) to get on/off exam table/chair.  [x]   0   Minimal Physical Assistance Requires physical assistance of one person to ambulate and/or position patient to be examined. Includes necessary physical assistance to position lower extremities on/off stool. []   1   Moderate Physical Assistance Requires at least one staff member to physically assist patient in ambulating into treatment room, and/or on off chair/bed.  Requires assistance to bathroom. []   2   Full Assistance Requires assistance of at least two staff members to transfer patient into treatment room and/or on/off bed/chair. \"Total Transfer\".  Unable to use bathroom requires bedside commode and/or bedpan []   3       Dressing Complexity Document in LDA Navigator  Complexity Definition Points   No Dressing  []   0   Simple Minimal, simple dressing. i.e. bandaid, gauze, simple wrap.  Peripheral IV dressing. []   1   Intermediate Moderately complicated PICC dressing change requiring sterile procedure and site assessment. []   2   Complex Complicated dressing change port access requiring sterile procedure and site assessment.  [x]   3       Teaching Effort Document in AVS and/or Education Navigator    Effort Definition Points   No Teaching  []   0   Simple Teach two or less topics.  Teaching documented in discharge instructions in AVS and copy given to patient. [x]   1   Intermediate Teach three to four topics.  Teaching documented in Discharge Instructions in AVS using References and Attachments. Copy given to patient.   []   2   Complex Teach five to six topics. Teaching documented in Discharge Instructions in AVS using References

## 2025-05-12 LAB
FUNGUS SPEC CULT: NORMAL
LOEFFLER MB STN SPEC: NORMAL

## 2025-05-13 LAB
ACID FAST STN SPEC QL: NORMAL
MYCOBACTERIUM SPEC CULT: NORMAL

## 2025-05-14 ENCOUNTER — OFFICE VISIT (OUTPATIENT)
Dept: PULMONOLOGY | Age: 45
End: 2025-05-14
Payer: COMMERCIAL

## 2025-05-14 ENCOUNTER — PATIENT MESSAGE (OUTPATIENT)
Dept: PULMONOLOGY | Age: 45
End: 2025-05-14

## 2025-05-14 ENCOUNTER — HOSPITAL ENCOUNTER (OUTPATIENT)
Age: 45
Discharge: HOME | End: 2025-05-14
Payer: COMMERCIAL

## 2025-05-14 ENCOUNTER — HOSPITAL ENCOUNTER (OUTPATIENT)
Dept: GENERAL RADIOLOGY | Age: 45
Discharge: HOME | End: 2025-05-14
Attending: INTERNAL MEDICINE
Payer: COMMERCIAL

## 2025-05-14 VITALS
DIASTOLIC BLOOD PRESSURE: 78 MMHG | OXYGEN SATURATION: 98 % | SYSTOLIC BLOOD PRESSURE: 130 MMHG | HEIGHT: 60 IN | BODY MASS INDEX: 45 KG/M2 | RESPIRATION RATE: 18 BRPM | TEMPERATURE: 97.4 F | WEIGHT: 229.2 LBS | HEART RATE: 85 BPM

## 2025-05-14 DIAGNOSIS — J90 PLEURAL EFFUSION ON LEFT: Primary | ICD-10-CM

## 2025-05-14 DIAGNOSIS — Z09 HOSPITAL DISCHARGE FOLLOW-UP: ICD-10-CM

## 2025-05-14 DIAGNOSIS — J90 PLEURAL EFFUSION ON LEFT: ICD-10-CM

## 2025-05-14 PROCEDURE — G2211 COMPLEX E/M VISIT ADD ON: HCPCS | Performed by: INTERNAL MEDICINE

## 2025-05-14 PROCEDURE — 99214 OFFICE O/P EST MOD 30 MIN: CPT | Performed by: INTERNAL MEDICINE

## 2025-05-14 PROCEDURE — 1111F DSCHRG MED/CURRENT MED MERGE: CPT | Performed by: INTERNAL MEDICINE

## 2025-05-14 PROCEDURE — 71046 X-RAY EXAM CHEST 2 VIEWS: CPT

## 2025-05-14 NOTE — PROGRESS NOTES
Pulmonary progress note     Patient's name:  Stacie Donato  Medical Record Number: [id number]  Patient's account/billing number:   Patient's YOB: 1980  Age: 44 y.o.  Date of Admission: No admission date for patient encounter.  Date of Consult: 5/14/2025      Primary Care Physician: Samuel Sy MD      Code Status: Full Code    Reason for consult: Left empyema     Assessment and Plan     Left empyema status post chest tube  H/o strep PNA  Obesity   Pleuritic chest pain       Plan:  Check CXR today  Follow up with pulmonary as needed          HISTORY OF PRESENT ILLNESS:   /Ms. Stacie Donato is a 44 y.o.  lady recently discharged form the hospital after admission for multifocal strep Pna with left effusion that was drained, presented with chest pain  Found to have loculated effusion  Fever    Here for follow up   S/p chest tube  Has been doing well  No fever   Some pleuritic pain very mild on the left            REVIEW OF SYSTEMS:  Review of Systems -   General ROS: negative  Psychological ROS: negative  Ophthalmic ROS: negative  ENT ROS: negative  Allergy and Immunology ROS: negative  Hematological and Lymphatic ROS: negative  Endocrine ROS: negative  Breast ROS: negative  Respiratory ROS: chest pain, sob  Cardiovascular ROS: pleuritic chest jaylon  Gastrointestinal ROS:negative  Genito-Urinary ROS: negative  Musculoskeletal ROS: negative  Neurological ROS: negative  Dermatological ROS: negative        Physical Exam:    Vitals: /78 (BP Site: Right Upper Arm, Patient Position: Sitting, BP Cuff Size: Medium Adult)   Pulse 85   Temp 97.4 °F (36.3 °C)   Resp 18   Ht 1.524 m (5')   Wt 104 kg (229 lb 3.2 oz)   SpO2 98%   BMI 44.76 kg/m²     Last Body weight:   Wt Readings from Last 3 Encounters:   05/14/25 104 kg (229 lb 3.2 oz)   05/07/25 104.9 kg (231 lb 3.2 oz)   05/01/25 101.2 kg (223 lb)       Body Mass Index : Body mass index is

## 2025-05-19 LAB
FUNGUS SPEC CULT: NORMAL
LOEFFLER MB STN SPEC: NORMAL

## 2025-05-20 ENCOUNTER — PHARMACY VISIT (OUTPATIENT)
Dept: PHARMACY | Age: 45
End: 2025-05-20
Payer: COMMERCIAL

## 2025-05-20 VITALS — WEIGHT: 236 LBS | BODY MASS INDEX: 46.09 KG/M2

## 2025-05-20 DIAGNOSIS — E11.9 TYPE 2 DIABETES MELLITUS WITHOUT COMPLICATION, WITH LONG-TERM CURRENT USE OF INSULIN (HCC): Primary | ICD-10-CM

## 2025-05-20 DIAGNOSIS — Z79.4 TYPE 2 DIABETES MELLITUS WITHOUT COMPLICATION, WITH LONG-TERM CURRENT USE OF INSULIN (HCC): Primary | ICD-10-CM

## 2025-05-20 LAB
ACID FAST STN SPEC QL: NORMAL
MYCOBACTERIUM SPEC CULT: NORMAL

## 2025-05-20 NOTE — PATIENT INSTRUCTIONS
- Continue Lantus 30 units every night.  - Once you get your test strips and start checking glucose, if glucose is > 150 in the morning then increase Lantus to 33 units nightly    - Continue Metformin XR 500mg twice a day with meals.     - we will plan on starting Trulicity at your next visit.     - Keep up the great work with making changes to eating and drinking patterns.     - continue to work to lose weight    - continue to walk regularly    - If you have symptoms of low blood sugar (jitteriness, nausea, headache, etc.) check your blood sugar. If it's less than 70, eat something with 15 grams of carbs. Call us at [phone] if your symptoms don't resolve.   Items with 15g carbs:   1/2 cup fruit  Candy (check nutrition lable)  1/2 cup of regular pop or juice

## 2025-05-20 NOTE — PROGRESS NOTES
[facility]  [facility]  Pharmacy  Diabetes Service    [address] [place], [place] [address]  Phone: [phone]  Fax: [phone]    NAME: Stacie Donato  MEDICAL RECORD NUMBER:  [id number]  AGE: 44 y.o.   GENDER: female  : 1980  EPISODE DATE:  2025       Stacie Donato was referred to the [facility] by Samuel Sy MD   for Diabetes services.   Special Instructions per the Referral Include: Patient Education and Medication Management      ICD-10-CM    1. Type 2 diabetes mellitus without complication, with long-term current use of insulin (HCC)  E11.9 [facility] Referral to Clinical Pharmacy - [place]    Z79.4         Referral goals: A1C: < 7    Medication adjustments or recommendations will follow ADA guidelines unless otherwise specified on the referral.    Juan Quinones is a 44 y.o. here for the Diabetes Service for self-management education, medication review including over the counter medications and herbal products, overall wellbeing assessment, transition of care and any needed adjustments with updates and recommendations communicated to the referring physician.  Stacie Donato appears well and in no acute distress. Comes with no ambulatory device assistance. Is here today alone for diabetes management. Stacie appears ready, willing and able to engage in self-management activities.     Pertinent findings in the office today:   Fatigue, Denies polyuria, Denies symptoms of neuropathy, Denies foot ulcerations, Denies nausea, Denies vomiting, and Denies weight loss    Date of diabetes diagnosis: Less than one year, 3/2025    Ongoing factors affecting care:   - Hospitalization at [facility] from 3/7 - 3/18/2025 for PNA   - hospitalized -25 for Empyema. Recurrent loculated left pleural effusion   - Currently uninsured, [facility] her medications through [employer]

## 2025-05-21 PROCEDURE — 99213 OFFICE O/P EST LOW 20 MIN: CPT

## 2025-05-21 RX ORDER — GLUCOSAMINE HCL/CHONDROITIN SU 500-400 MG
CAPSULE ORAL
Qty: 200 STRIP | Refills: 3 | Status: SHIPPED | OUTPATIENT
Start: 2025-05-21 | End: 2025-05-21

## 2025-05-21 RX ORDER — GLUCOSAMINE HCL/CHONDROITIN SU 500-400 MG
CAPSULE ORAL
Qty: 200 STRIP | Refills: 3 | Status: SHIPPED | OUTPATIENT
Start: 2025-05-21

## 2025-05-27 ENCOUNTER — OFFICE VISIT (OUTPATIENT)
Dept: PRIMARY CARE CLINIC | Age: 45
End: 2025-05-27
Payer: COMMERCIAL

## 2025-05-27 VITALS
TEMPERATURE: 98.2 F | OXYGEN SATURATION: 98 % | SYSTOLIC BLOOD PRESSURE: 140 MMHG | DIASTOLIC BLOOD PRESSURE: 85 MMHG | HEART RATE: 86 BPM | WEIGHT: 230 LBS | BODY MASS INDEX: 44.92 KG/M2

## 2025-05-27 DIAGNOSIS — Z79.4 TYPE 2 DIABETES MELLITUS WITHOUT COMPLICATION, WITH LONG-TERM CURRENT USE OF INSULIN (HCC): Primary | ICD-10-CM

## 2025-05-27 DIAGNOSIS — J18.9 PARAPNEUMONIC EFFUSION: ICD-10-CM

## 2025-05-27 DIAGNOSIS — E11.9 TYPE 2 DIABETES MELLITUS WITHOUT COMPLICATION, WITH LONG-TERM CURRENT USE OF INSULIN (HCC): Primary | ICD-10-CM

## 2025-05-27 DIAGNOSIS — R03.0 ELEVATED BLOOD PRESSURE READING: ICD-10-CM

## 2025-05-27 DIAGNOSIS — J91.8 PARAPNEUMONIC EFFUSION: ICD-10-CM

## 2025-05-27 LAB
ACID FAST STN SPEC QL: NORMAL
MYCOBACTERIUM SPEC CULT: NORMAL

## 2025-05-27 PROCEDURE — 99213 OFFICE O/P EST LOW 20 MIN: CPT | Performed by: INTERNAL MEDICINE

## 2025-05-27 PROCEDURE — 3046F HEMOGLOBIN A1C LEVEL >9.0%: CPT | Performed by: INTERNAL MEDICINE

## 2025-05-27 RX ORDER — FLUOCINONIDE TOPICAL SOLUTION USP, 0.05% 0.5 MG/ML
SOLUTION TOPICAL
Qty: 500 ML | Refills: 2 | Status: SHIPPED | OUTPATIENT
Start: 2025-05-27

## 2025-05-27 RX ORDER — TRIAMCINOLONE ACETONIDE 0.25 MG/G
OINTMENT TOPICAL
Qty: 500 G | Refills: 1 | Status: SHIPPED | OUTPATIENT
Start: 2025-05-27 | End: 2025-06-03

## 2025-05-27 NOTE — PATIENT INSTRUCTIONS
Type 2 Diabetes:   Initial hemoglobin A1c was 10.3% initially and 7.2% at [place] (goal less than 6.5%) with subsequent control much better.  Continue taking metformin extended release 500 mg tablet TWICE per day and glargine (Lantus Solostar) 30 units nightly.     History of pneumonia with loculated parapneumonic effusion:  Completed three week course of Invanz and PICC line removed.  CT chest (May 6) was improved with residual partially loculated trace left pleural effusion. Oxygen saturation is 98% today.    Blood pressure borderline today at 140/85, goal less than 130/80.   We will monitor.   Plaque psoriasis on the elbows.  Start back using fluocinonide (LIDEX) 0.05 % external solution TWICE per day and triamcinolone as needed.  Follow up with  dermatology as needed.       Follow-up in four weeks

## 2025-05-27 NOTE — PROGRESS NOTES
SUBJECTIVE:   Follow up from 4/29 for empyema s/p three weeks of daily Invanz, completed May 9.  She took fluconazole for Candida vaginitis with resolution.  Morning glucose levels 130 on metformin and glargine insulin.  She is starting to have psoriasis on both elbows off the Skyrizi (risankizumab), last dose was Nov 2024 with next dose scheduled for March 2024, which she did not get due to empyema.  She has been told by infectious disease not to receive injectable immunosuppressants.      History of present illness:    Hospitalized (4/9 - 4/18) with empyema requiring a chest tube and TNK.  She has PICC line for Invanz (ertapenem) 1000 mg IV daily until May 9 with Dr Hernandez and will be seen May 7.  CT chest pending May 6.  Glucose levels much better during hospitalization .  Morning glucose levels at home have been 150s.  She is losing weight and [facility] and increased metformin XR to 500 mg BID.  She was at [facility] pharmacy for certification and pharmacist recommended Trulicity.      Hospitalized (3/7 - 3/18) with left-sided pleural effusion with cavitation requiring IV antibiotics and thoracentesis, discharged on linezolid and Augmentin with two doses remaining.  Diagnosed with type 2 diabetes and discharged on metformin 500 mg BID with some gastritis, started yesterday.   Also discharged on glargine insulin Basaglar injection pen 28 units daily with lispro Humalog 8 units with meals.  She was seen by [facility] (3[facility]) and was started on metformin.  Pleuritic pain has mostly resolved and now able to lay on left side.  She works at [employer] in [employer] and experiences exertional dyspnea with walking around the hospital.  She is using the incentive spirometer that she can in the hospital.      History of plaque psoriasis managed by  Dermatology at [facility] with topical steroid and Skyrizi (risankizumab) every three months, last in Nov 2024.  Last

## 2025-05-28 ENCOUNTER — RESULTS FOLLOW-UP (OUTPATIENT)
Dept: PULMONOLOGY | Age: 45
End: 2025-05-28

## 2025-06-03 LAB
ACID FAST STN SPEC QL: NORMAL
MYCOBACTERIUM SPEC CULT: NORMAL